# Patient Record
Sex: FEMALE | Race: WHITE | NOT HISPANIC OR LATINO | Employment: FULL TIME | URBAN - METROPOLITAN AREA
[De-identification: names, ages, dates, MRNs, and addresses within clinical notes are randomized per-mention and may not be internally consistent; named-entity substitution may affect disease eponyms.]

---

## 2017-01-13 ENCOUNTER — OFFICE VISIT (OUTPATIENT)
Dept: SLEEP MEDICINE | Facility: CLINIC | Age: 57
End: 2017-01-13
Payer: COMMERCIAL

## 2017-01-13 VITALS
DIASTOLIC BLOOD PRESSURE: 70 MMHG | WEIGHT: 167.69 LBS | HEIGHT: 63 IN | SYSTOLIC BLOOD PRESSURE: 100 MMHG | BODY MASS INDEX: 29.71 KG/M2 | HEART RATE: 80 BPM

## 2017-01-13 DIAGNOSIS — G47.33 OSA (OBSTRUCTIVE SLEEP APNEA): Primary | ICD-10-CM

## 2017-01-13 PROCEDURE — 99213 OFFICE O/P EST LOW 20 MIN: CPT | Mod: S$GLB,,, | Performed by: PSYCHIATRY & NEUROLOGY

## 2017-01-13 PROCEDURE — 99999 PR PBB SHADOW E&M-EST. PATIENT-LVL III: CPT | Mod: PBBFAC,,, | Performed by: PSYCHIATRY & NEUROLOGY

## 2017-01-13 NOTE — PROGRESS NOTES
This 56 y.o. female patient presents for the management of obstructive sleep apnea.    Patient had a repeat sleep study after significant weight loss following bariatric procedure.  PSG revealed VINOD, but limited to supine sleep. Total AHI 14.3, supine AHI 45.1, side AHI 3.7; wt 138 lbs.    She needs new supplies. It has been over 2 years since her last sleep medicine evaluation.    She reports nightly use; Good control of her VINOD symptoms; Due to using CPAP she has resolution of headaches and daytime sleepiness.    ESS = 1    DME = OHME    CPAP interrogation: REmstar Plus; set at 8 cm; blowing at 8.0 cm; 27,448 hours    Prior weight 139 lbs.   Wt today is 167 lbs      Past Medical History   Diagnosis Date    Anxiety      situational    Deviated septum     Fibroid     Hyperlipemia     Liver disease      fatty liver    PONV (postoperative nausea and vomiting)     Sleep apnea      cpap pressure 8    Steatosis        Past Surgical History   Procedure Laterality Date    Dilation and curettage of uterus      Gastric bypass      Hemorrhoid surgery      Tonsillectomy, adenoidectomy      Tubal ligation      Nelson tooth extraction      Carpal tunnel release      Lasik       10/26/03    Hysterectomy       TVH BSO       Family History   Problem Relation Age of Onset    Hypothyroidism Mother      had thyroidectomy    Hypertension Father     Arthritis Father     Hyperlipidemia Father     Psoriasis Neg Hx     Lupus Neg Hx     Ovarian cancer Neg Hx     Cervical cancer Neg Hx     Endometrial cancer Neg Hx     Vaginal cancer Neg Hx     Eczema Daughter     Acne Daughter     Acne Son     Breast cancer Paternal Aunt     Breast cancer Paternal Grandmother        Social History   Substance Use Topics    Smoking status: Never Smoker    Smokeless tobacco: Never Used    Alcohol use No       ALLERGIES: Reviewed in EPIC    CURRENT MEDICATIONS: Reviewed in EPIC    REVIEW OF SYSTEMS:  Sleep related symptoms as  "per HPI;    Weight is stable;   Sometimes sinus problems - nasal drip;    Denies dyspnea;    Denies palpitations;     PHYSICAL EXAM:  Visit Vitals    /70    Pulse 80    Ht 5' 3" (1.6 m)    Wt 76.1 kg (167 lb 11.2 oz)    BMI 29.71 kg/m2     GENERAL: Well groomed; Normally developed;  HEENT: Conjunctivae are non-erythematous; Pupils equal, round, and reactive to light;    Nose is skewed to the right; Nasal mucosa is pink and moist; Septum is midline;    Turbinates are normal; Nasal airflow is normal;    Posterior pharynx is pink; Posterior palate is normal; Modified Mallampati: III   Uvula is normal; Tongue is normal; Tonsils +1;    Dentition is fair; No TMJ tenderness;    Jaw opening and protrusion without click and without discomfort.  NECK: Supple. No thyromegaly. No palpable nodes.  SKIN: On face and neck: No abrasions, no rashes, no lesions.     No subcutaneous nodules are palpable.  RESPIRATORY: Chest is clear to auscultation.     Normal chest expansion and non-labored breathing at rest.  CARDIOVASCULAR: Normal S1, S2.  No murmurs, gallops or rubs.   No carotid bruits bilaterally.  EXTREMITIES: No clubbing. No cyanosis. Edema is absent.   NEURO: Oriented to time, place and person.    Normal attention span and concentration.  Station normal. Gait normal.  PSYCH: Affect is full. Mood is normal.      ASSESSMENT:    1. Obstructive Sleep Apnea, mild by AHI criteria. Symptoms are controlled with nightly CPAP. Effective pressure, defined by titration, but her weight is up >10%. She needs new supplies and equipment; He machine is reaching end of life. She needs a new machine.         PLAN:    Treatment:  1. New auto CPAP at 8-12 cm; New supplies  2. Set at self-adjusting mode since she had 20 lbs weight gain.    Precautions: The patient was advised to abstain from driving should they feel sleepy or drowsy.    Follow up: 6-12 weeks. MD/NP  "

## 2017-01-13 NOTE — MR AVS SNAPSHOT
[facility]  [address]  [facility] [address]  Phone: [phone]                  Ayah Ceron   2017 10:40 AM   Office Visit    Description:  Female : 1960   Provider:  Erlin Hurtado MD   Department:  [facility]           Reason for Visit     Sleep Apnea           Diagnoses this Visit        Comments    VINOD (obstructive sleep apnea)    -  Primary            To Do List           Future Appointments        Provider Department Dept Phone    2017 3:15 PM Manuela Suarez, OD [facility] - [facility] [phone]    3/6/2017 9:00 AM Erlin Hurtado MD [facility] [phone]      Goals (5 Years of Data)     None      Follow-Up and Disposition     Return in about 7 weeks (around 3/3/2017).    Follow-up and Disposition History      [facility]     [facility] -  Assistance  Registered nurses in the [facility] provide clinical advisement, health education, appointment booking, and other advisory services.  Call for this free service at [phone].             Medications           Message regarding Medications     Verify the changes and/or additions to your medication regime listed below are the same as discussed with your clinician today.  If any of these changes or additions are incorrect, please notify your healthcare provider.             Verify that the below list of medications is an accurate representation of the medications you are currently taking.  If none reported, the list may be blank. If incorrect, please contact your healthcare provider. Carry this list with you in case of emergency.           Current Medications     calcium citrate-vitamin D3 500 mg calcium -400 unit Chew Take by mouth. 2 tablets once per day    conjugated estrogens (PREMARIN) vaginal cream Place 0.5 g vaginally twice a week.    cyanocobalamin (VITAMIN B-12) 500 MCG tablet Take 500 mcg by mouth once daily. sublingual      "desloratadine (CLARINEX) 5 mg tablet Take 1 tablet (5 mg total) by mouth once daily.    FERROUS FUMARATE/VIT BCOMP&C (SUPER B COMPLEX ORAL) Take by mouth.    MAGNESIUM OXIDE (MAG-OXIDE ORAL) Take by mouth.    multivitamin capsule Take 1 capsule by mouth 2 (two) times daily.             Clinical Reference Information           Vital Signs - Last Recorded  Most recent update: 1/13/2017 10:34 AM by Stu Weeks    BP Pulse Ht Wt BMI    100/70 80 5' 3" (1.6 m) 76.1 kg (167 lb 11.2 oz) 29.71 kg/m2      Blood Pressure          Most Recent Value    BP  100/70      Allergies as of 1/13/2017     Augmentin [Amoxicillin-pot Clavulanate]      Immunizations Administered on Date of Encounter - 1/13/2017     None      Orders Placed During Today's Visit      Normal Orders This Visit    CPAP FOR HOME USE       "

## 2017-01-13 NOTE — LETTER
January 13, 2017      Elena Ortiz MD  [address]  [facility] [address]           [facility]  [address]  [facility] [address]  Phone: [phone]          Patient: Ayah Ceron   MR Number: [id number]   YOB: 1960   Date of Visit: 1/13/2017       Dear Dr. Elena Ortiz:    Thank you for referring Ayah Ceron to me for evaluation. Attached you will find relevant portions of my assessment and plan of care.    If you have questions, please do not hesitate to call me. I look forward to following Ayah Ceron along with you.    Sincerely,    Erlin Hurtado MD    Enclosure  CC:  No Recipients    If you would like to receive this communication electronically, please contact [email or web address] or [phone] to request more information on [employer] Link access.    For providers and/or their staff who would like to refer a patient to [facility], please contact us through our one-stop-shop provider referral line, [facility], at [phone].    If you feel you have received this communication in error or would no longer like to receive these types of communications, please e-mail [email or web address]

## 2017-01-17 ENCOUNTER — INITIAL CONSULT (OUTPATIENT)
Dept: OPTOMETRY | Facility: CLINIC | Age: 57
End: 2017-01-17
Payer: COMMERCIAL

## 2017-01-17 DIAGNOSIS — Z01.00 EXAMINATION OF EYES AND VISION: Primary | ICD-10-CM

## 2017-01-17 DIAGNOSIS — H52.4 PRESBYOPIA: ICD-10-CM

## 2017-01-17 PROCEDURE — 92014 COMPRE OPH EXAM EST PT 1/>: CPT | Mod: S$GLB,,, | Performed by: OPTOMETRIST

## 2017-01-17 PROCEDURE — 99999 PR PBB SHADOW E&M-EST. PATIENT-LVL II: CPT | Mod: PBBFAC,,, | Performed by: OPTOMETRIST

## 2017-01-17 NOTE — LETTER
January 18, 2017      Elena Ortiz MD  [address]  [facility] [address]           [facility]  [address]  [facility] [address]  Phone: [phone]  Fax: [phone]          Patient: Ayah Ceron   MR Number: [id number]   YOB: 1960   Date of Visit: 1/17/2017       Dear Dr. Elena Ortiz:    Thank you for referring Ayah Ceron to me for evaluation. Attached you will find relevant portions of my assessment and plan of care.    If you have questions, please do not hesitate to call me. I look forward to following Ayah Ceron along with you.    Sincerely,    Manuela Suarez, OD    Enclosure  CC:  No Recipients    If you would like to receive this communication electronically, please contact [email or web address] or [phone] to request more information on [employer] Link access.    For providers and/or their staff who would like to refer a patient to [facility], please contact us through our one-stop-shop provider referral line, [facility], at [phone].    If you feel you have received this communication in error or would no longer like to receive these types of communications, please e-mail [email or web address]

## 2017-01-19 NOTE — PROGRESS NOTES
HPI     Last eye exam was 4/7/16 with Dr. Suarez.  Patient states no vision complaints since last exam. Only wears OTC   readers for very small print.   Patient denies diplopia, headaches, flashes/floaters, and pain.         Last edited by Wendy Castro on 1/17/2017  3:10 PM.         Assessment /Plan     For exam results, see Encounter Report.    Examination of eyes and vision    Presbyopia            1-2.   Continue w/ current rx.  Eye health normal OU.  RTC 1 year for routine exam.

## 2017-01-31 ENCOUNTER — OFFICE VISIT (OUTPATIENT)
Dept: HEPATOLOGY | Facility: CLINIC | Age: 57
End: 2017-01-31
Payer: COMMERCIAL

## 2017-01-31 VITALS
DIASTOLIC BLOOD PRESSURE: 70 MMHG | HEART RATE: 71 BPM | HEIGHT: 64 IN | TEMPERATURE: 98 F | WEIGHT: 149.25 LBS | SYSTOLIC BLOOD PRESSURE: 111 MMHG | BODY MASS INDEX: 25.48 KG/M2 | RESPIRATION RATE: 18 BRPM | OXYGEN SATURATION: 97 %

## 2017-01-31 DIAGNOSIS — E78.5 HYPERLIPIDEMIA, UNSPECIFIED HYPERLIPIDEMIA TYPE: ICD-10-CM

## 2017-01-31 DIAGNOSIS — K76.0 NAFL (NONALCOHOLIC FATTY LIVER): Primary | ICD-10-CM

## 2017-01-31 DIAGNOSIS — Z98.84 HISTORY OF GASTRIC BYPASS: ICD-10-CM

## 2017-01-31 PROCEDURE — 99999 PR PBB SHADOW E&M-EST. PATIENT-LVL III: CPT | Mod: PBBFAC,,, | Performed by: INTERNAL MEDICINE

## 2017-01-31 PROCEDURE — 99213 OFFICE O/P EST LOW 20 MIN: CPT | Mod: S$GLB,,, | Performed by: INTERNAL MEDICINE

## 2017-01-31 NOTE — PROGRESS NOTES
HEPATOLOGY FOLLOW UP    Ayah Ceron is here for follow up of Fatty Liver    HPI  Since Ayah Ceron's last visit in 2014 she has being doing well. She had elevated liver enzymes post-gastric bypass. These have normalized. No symptoms of hepatic decompensation. Currently feels well.    Outpatient Encounter Prescriptions as of 1/31/2017   Medication Sig Dispense Refill    calcium citrate-vitamin D3 500 mg calcium -400 unit Chew Take by mouth. 2 tablets once per day      conjugated estrogens (PREMARIN) vaginal cream Place 0.5 g vaginally twice a week. 30 g 11    cyanocobalamin (VITAMIN B-12) 500 MCG tablet Take 500 mcg by mouth once daily. sublingual       desloratadine (CLARINEX) 5 mg tablet Take 1 tablet (5 mg total) by mouth once daily. 90 tablet 3    FERROUS FUMARATE/VIT BCOMP&C (SUPER B COMPLEX ORAL) Take by mouth.      MAGNESIUM OXIDE (MAG-OXIDE ORAL) Take by mouth.      multivitamin capsule Take 1 capsule by mouth 2 (two) times daily.         No facility-administered encounter medications on file as of 1/31/2017.      Review of patient's allergies indicates:   Allergen Reactions    Augmentin [amoxicillin-pot clavulanate]      Abdominal cramping     Past Medical History   Diagnosis Date    Anxiety      situational    Deviated septum     Fibroid     Hyperlipemia     Liver disease      fatty liver    PONV (postoperative nausea and vomiting)     Sleep apnea      cpap pressure 8    Steatosis        Review of Systems   Constitutional: Negative for appetite change, fatigue and unexpected weight change.   HENT: Negative for ear pain, hearing loss, sore throat and trouble swallowing.    Eyes: Negative for visual disturbance.   Respiratory: Negative for cough and shortness of breath.    Cardiovascular: Negative for chest pain and palpitations.   Gastrointestinal: Negative for abdominal pain, nausea and vomiting.   Genitourinary: Negative for difficulty urinating and dysuria.   Musculoskeletal: Negative  for arthralgias.   Skin: Negative for rash.   Neurological: Negative for tremors, seizures and headaches.   Psychiatric/Behavioral: Negative for agitation and decreased concentration.     Vitals:    01/31/17 1426   BP: 111/70   Pulse: 71   Resp: 18   Temp: 97.8 °F (36.6 °C)       Physical Exam   Constitutional: She is oriented to person, place, and time. She appears well-developed and well-nourished.   HENT:   Right Ear: External ear normal.   Left Ear: External ear normal.   Mouth/Throat: Oropharynx is clear and moist.   Eyes: No scleral icterus.   Cardiovascular: Normal rate, regular rhythm and normal heart sounds.  Exam reveals no gallop and no friction rub.    No murmur heard.  Pulmonary/Chest: Effort normal and breath sounds normal. No respiratory distress. She has no wheezes.   Abdominal: Soft. Bowel sounds are normal. She exhibits no distension and no mass. There is no tenderness.   Musculoskeletal: Normal range of motion. She exhibits no edema.   Lymphadenopathy:     She has no cervical adenopathy.   Neurological: She is alert and oriented to person, place, and time. She has normal strength.   Skin: Skin is warm, dry and intact. She is not diaphoretic. No pallor.       Lab Results   Component Value Date    GLU 88 10/25/2016    BUN 14 10/25/2016    CREATININE 0.7 10/25/2016    CALCIUM 9.6 10/25/2016     10/25/2016    K 4.0 10/25/2016     10/25/2016    PROT 6.7 10/25/2016    CO2 30 (H) 10/25/2016    ANIONGAP 8 10/25/2016    WBC 3.77 (L) 10/25/2016    RBC 4.78 10/25/2016    HGB 14.6 10/25/2016    HCT 43.2 10/25/2016    MCV 90 10/25/2016    MCH 30.5 10/25/2016    MCHC 33.8 10/25/2016     Lab Results   Component Value Date    RDW 12.9 10/25/2016     10/25/2016    MPV 10.2 10/25/2016    GRAN 1.6 (L) 10/25/2016    GRAN 42.1 10/25/2016    LYMPH 1.5 10/25/2016    LYMPH 40.6 10/25/2016    MONO 0.3 10/25/2016    MONO 7.7 10/25/2016    EOSINOPHIL 7.2 10/25/2016    BASOPHIL 1.9 10/25/2016    EOS 0.3  10/25/2016    BASO 0.07 10/25/2016    APTT 27.0 10/17/2012    GROUPTRH O POS 04/19/2011    PADMINI Negative 05/19/2010    CHOL 222 (H) 10/25/2016    TRIG 77 10/25/2016    HDL 72 10/25/2016    CHOLHDL 32.4 10/25/2016    TOTALCHOLEST 3.1 10/25/2016    ALBUMIN 3.9 10/25/2016    BILIDIR 0.2 10/17/2012    AST 28 10/25/2016    ALT 34 10/25/2016    ALKPHOS 86 10/25/2016    MG 2.1 04/26/2011    LABPROT 11.2 10/17/2012    INR 1.1 10/17/2012     Diagnostics:     Assessment and Plan:  Patient Active Problem List   Diagnosis    Functional dyspareunia    Hyperlipemia    Steatosis    Deviated septum    Sinus problem    History of gastric bypass    Rotator cuff tear    Vaginal relaxation    Incomplete defecation    Rectocele    Sleep apnea    Atrophic vaginitis    NAFL (nonalcoholic fatty liver)     Ayah Ceron is a 56 y.o. female withFatty Liver    Had liver enzymes after gastric bypass, but these have now normalized. Recommend periodic testing of LFTs (q6-12months). RTC in 12 months.

## 2017-01-31 NOTE — LETTER
January 31, 2017      Elena Ortiz MD  [address]  [facility] [address]           [facility] - Hepatology  [address]  [facility] [address]  Phone: [phone]  Fax: [phone]          Patient: Ayah Ceron   MR Number: [id number]   YOB: 1960   Date of Visit: 1/31/2017       Dear Dr. Elena Ortiz:    Thank you for referring Ayah Ceron to me for evaluation. Attached you will find relevant portions of my assessment and plan of care.    If you have questions, please do not hesitate to call me. I look forward to following Ayah Ceron along with you.    Sincerely,    Satinder Leiva MD    Enclosure  CC:  No Recipients    If you would like to receive this communication electronically, please contact [email or web address] or [phone] to request more information on [employer] Link access.    For providers and/or their staff who would like to refer a patient to [facility], please contact us through our one-stop-shop provider referral line, [facility], at [phone].    If you feel you have received this communication in error or would no longer like to receive these types of communications, please e-mail [email or web address]

## 2017-03-06 ENCOUNTER — OFFICE VISIT (OUTPATIENT)
Dept: SLEEP MEDICINE | Facility: CLINIC | Age: 57
End: 2017-03-06
Payer: COMMERCIAL

## 2017-03-06 VITALS
SYSTOLIC BLOOD PRESSURE: 109 MMHG | HEART RATE: 80 BPM | WEIGHT: 148.81 LBS | DIASTOLIC BLOOD PRESSURE: 75 MMHG | BODY MASS INDEX: 25.41 KG/M2 | HEIGHT: 64 IN

## 2017-03-06 DIAGNOSIS — G47.33 OSA (OBSTRUCTIVE SLEEP APNEA): Primary | ICD-10-CM

## 2017-03-06 PROCEDURE — 99213 OFFICE O/P EST LOW 20 MIN: CPT | Mod: S$GLB,,, | Performed by: PSYCHIATRY & NEUROLOGY

## 2017-03-06 PROCEDURE — 99999 PR PBB SHADOW E&M-EST. PATIENT-LVL III: CPT | Mod: PBBFAC,,, | Performed by: PSYCHIATRY & NEUROLOGY

## 2017-03-06 NOTE — PROGRESS NOTES
This 56 y.o. female patient returns for the management of obstructive sleep apnea.    She is here on a new CPAP machine;  She reports nightly use; Good control of her VINOD symptoms; No snoring  Due to using CPAP she has resolution of headaches and daytime sleepiness.    ESS = 1    DME = OHME    CPAP interrogation: Compliant 30/30 days; REmstar Dreamstation; set at 8-12 cm; 30/30 days >4 hours; 30-day ave 7.9 hours/n; AHI 3.9, 90% 10 cm    Prior weight 167 lbs. (she questions whether this measurement was correct)   Wt today is 148 lbs    Patient had a repeat sleep study after significant weight loss following bariatric procedure.  PSG revealed VINOD, but limited to supine sleep. Total AHI 14.3, supine AHI 45.1, side AHI 3.7; wt 138 lbs.    Past Medical History:   Diagnosis Date    Anxiety     situational    Deviated septum     Fibroid     Hyperlipemia     Liver disease     fatty liver    PONV (postoperative nausea and vomiting)     Sleep apnea     cpap pressure 8    Steatosis        Past Surgical History:   Procedure Laterality Date    CARPAL TUNNEL RELEASE      DILATION AND CURETTAGE OF UTERUS      GASTRIC BYPASS      HEMORRHOID SURGERY      HYSTERECTOMY      TVH BSO    LASIK      10/26/03    TONSILLECTOMY, ADENOIDECTOMY      TUBAL LIGATION      WISDOM TOOTH EXTRACTION         Family History   Problem Relation Age of Onset    Hypothyroidism Mother      had thyroidectomy    Hypertension Father     Arthritis Father     Hyperlipidemia Father     Eczema Daughter     Acne Daughter     Acne Son     Breast cancer Paternal Aunt     Breast cancer Paternal Grandmother     Psoriasis Neg Hx     Lupus Neg Hx     Ovarian cancer Neg Hx     Cervical cancer Neg Hx     Endometrial cancer Neg Hx     Vaginal cancer Neg Hx        Social History   Substance Use Topics    Smoking status: Never Smoker    Smokeless tobacco: Never Used    Alcohol use No       ALLERGIES: Reviewed in EPIC    CURRENT MEDICATIONS:  "Reviewed in EPIC    REVIEW OF SYSTEMS:  Sleep related symptoms as per HPI;    Weight is stable;   Sometimes sinus problems - nasal drip;    Denies dyspnea;    Denies palpitations;     PHYSICAL EXAM:  /75  Pulse 80  Ht 5' 4" (1.626 m)  Wt 67.5 kg (148 lb 13 oz)  BMI 25.54 kg/m2  GENERAL: Well groomed; Normally developed;  HEENT: Conjunctivae are non-erythematous; Pupils equal, round, and reactive to light;        ASSESSMENT:    1. Obstructive Sleep Apnea, mild by AHI criteria. Symptoms are controlled with nightly CPAP. Effective pressure by 90%tile on new CPAP. She is adherent objectively and symptomatically improved. She has exceeded minimal usage after replacement machine.         PLAN:    Treatment:  1. Continue auto CPAP at 8-12 cm; CPAP supplies  2. Weight up 10 lbs from requal study.    Precautions: The patient was advised to abstain from driving should they feel sleepy or drowsy.    Follow up: 6-12 months. MD/NP  "

## 2017-07-10 ENCOUNTER — TELEPHONE (OUTPATIENT)
Dept: INTERNAL MEDICINE | Facility: CLINIC | Age: 57
End: 2017-07-10

## 2017-07-10 DIAGNOSIS — Z12.31 ENCOUNTER FOR SCREENING MAMMOGRAM FOR BREAST CANCER: ICD-10-CM

## 2017-07-10 DIAGNOSIS — Z98.84 HISTORY OF GASTRIC BYPASS: Primary | ICD-10-CM

## 2017-07-10 DIAGNOSIS — Z00.00 WELLNESS EXAMINATION: ICD-10-CM

## 2017-07-10 NOTE — TELEPHONE ENCOUNTER
----- Message from Rossi Carrillo sent at 7/10/2017  9:09 AM CDT -----  Contact: self/[phone]  Patient called in regards needing to place an order for her mammogram, and labs for her upcoming visit on 10/23/17. Please call and advise.         Thank you!!!

## 2017-10-13 ENCOUNTER — OFFICE VISIT (OUTPATIENT)
Dept: BARIATRICS | Facility: CLINIC | Age: 57
End: 2017-10-13
Payer: COMMERCIAL

## 2017-10-13 VITALS
BODY MASS INDEX: 23.93 KG/M2 | SYSTOLIC BLOOD PRESSURE: 112 MMHG | HEIGHT: 64 IN | DIASTOLIC BLOOD PRESSURE: 67 MMHG | HEART RATE: 66 BPM | WEIGHT: 140.19 LBS

## 2017-10-13 DIAGNOSIS — R63.4 WEIGHT LOSS: ICD-10-CM

## 2017-10-13 DIAGNOSIS — Z98.84 HISTORY OF GASTRIC BYPASS: Primary | ICD-10-CM

## 2017-10-13 PROCEDURE — 99212 OFFICE O/P EST SF 10 MIN: CPT | Mod: S$GLB,,, | Performed by: PHYSICIAN ASSISTANT

## 2017-10-13 PROCEDURE — 99999 PR PBB SHADOW E&M-EST. PATIENT-LVL III: CPT | Mod: PBBFAC,,, | Performed by: PHYSICIAN ASSISTANT

## 2017-10-13 RX ORDER — DOCUSATE SODIUM 100 MG/1
100 CAPSULE, LIQUID FILLED ORAL 2 TIMES DAILY
COMMUNITY

## 2017-10-13 NOTE — PROGRESS NOTES
Subjective:       Patient ID: Ayah Ceron is a 56 y.o. female.    Chief Complaint: Follow-up (LRNY 4/25/11)    HPI: Patient presents for 6 year follow up of LRNY.  She is feeling well and tolerating the diet without difficulty.  She is without complaints.  She is maintaining 70 pound loss and a normal BMI.  She is doing very well.  She has a grand-daughter that is 2: Maurice.                                                                         EXERCISE and VITAMINS: Reviewed in bariatric assessment.                                                                                DIET:  Regular bariatric diet.  Obtains >80 gm prot daily through 4-5 small meals and 2 protein shakes daily (mostly while at work and not the weekends).   48+ fl oz                                                                               MEDICATIONS AND ALLERGIES:  Reviewed in Navigator.    Review of Systems   Constitutional: Negative for activity change, appetite change, fatigue and fever.   HENT: Negative for trouble swallowing and voice change.    Respiratory: Negative for cough, choking and chest tightness.    Cardiovascular: Negative for chest pain and palpitations.   Gastrointestinal: Negative for abdominal distention, abdominal pain, blood in stool, constipation, diarrhea, nausea and vomiting.       Objective:      Physical Exam   Constitutional: She is oriented to person, place, and time. She appears well-developed and well-nourished.   HENT:   Head: Normocephalic and atraumatic.   Eyes: Conjunctivae are normal. No scleral icterus.   Musculoskeletal: She exhibits no edema.   Neurological: She is alert and oriented to person, place, and time.   Skin: Skin is warm and dry.   Psychiatric: She has a normal mood and affect. Her behavior is normal.   Nursing note and vitals reviewed.      Assessment:       -  Excellent weight loss (99% EWL) s/p DESTINI on 4/25/2011.  -  Co Morbidities: VINOD (stable with CPAP), KAHN, and HDL.  All stable  without medication.   -  Excellent diet, except for regular carbohydrates that may be contributing to abdominal girth increase.  -  Good exercise.  -  Good vitamin regimen.    Plan:       - Patient to follow bariatric diet plan and continue obtaining 80 gm protein daily.   - Patient to continue exercising on a regular basis.  - Continue avoidance of NSAIDs to prevent gastric ulcer disease.  - Patient to take bariatric vitamin regimen: MVI, calcium citrate, and Vitamin B12 as directed.  - RTC yearly or every other year or sooner if needed.  - Call the office for any issues.  - Check yearly labs next week with PCP labs.       15 minute visit, over 15 minutes spent counseling patient on diet, vitamins, exercise, and weight loss.

## 2017-10-25 ENCOUNTER — DOCUMENTATION ONLY (OUTPATIENT)
Dept: INTERNAL MEDICINE | Facility: CLINIC | Age: 57
End: 2017-10-25

## 2017-10-25 ENCOUNTER — OFFICE VISIT (OUTPATIENT)
Dept: INTERNAL MEDICINE | Facility: CLINIC | Age: 57
End: 2017-10-25
Payer: COMMERCIAL

## 2017-10-25 VITALS
WEIGHT: 141.56 LBS | HEART RATE: 70 BPM | HEIGHT: 64 IN | BODY MASS INDEX: 24.17 KG/M2 | DIASTOLIC BLOOD PRESSURE: 62 MMHG | SYSTOLIC BLOOD PRESSURE: 116 MMHG | OXYGEN SATURATION: 98 %

## 2017-10-25 DIAGNOSIS — N95.2 ATROPHIC VAGINITIS: ICD-10-CM

## 2017-10-25 DIAGNOSIS — E78.00 PURE HYPERCHOLESTEROLEMIA: ICD-10-CM

## 2017-10-25 DIAGNOSIS — R79.0 LOW MAGNESIUM LEVEL: ICD-10-CM

## 2017-10-25 DIAGNOSIS — Z98.84 HISTORY OF GASTRIC BYPASS: ICD-10-CM

## 2017-10-25 DIAGNOSIS — Z12.11 COLON CANCER SCREENING: ICD-10-CM

## 2017-10-25 DIAGNOSIS — G47.30 SLEEP APNEA, UNSPECIFIED TYPE: ICD-10-CM

## 2017-10-25 DIAGNOSIS — Z00.00 PHYSICAL EXAM: Primary | ICD-10-CM

## 2017-10-25 PROCEDURE — 99999 PR PBB SHADOW E&M-EST. PATIENT-LVL III: CPT | Mod: PBBFAC,,, | Performed by: INTERNAL MEDICINE

## 2017-10-25 PROCEDURE — 99396 PREV VISIT EST AGE 40-64: CPT | Mod: S$GLB,,, | Performed by: INTERNAL MEDICINE

## 2017-10-25 RX ORDER — DESLORATADINE 5 MG/1
5 TABLET ORAL DAILY
Qty: 90 TABLET | Refills: 3 | Status: SHIPPED | OUTPATIENT
Start: 2017-10-25 | End: 2018-12-14 | Stop reason: SDUPTHER

## 2017-10-25 NOTE — PROGRESS NOTES
Subjective:      Patient ID: Ayah Ceron is a 56 y.o. female.    Chief Complaint: Annual Exam    HPI:  HPI   Patient is here for her annual exam:patient is not having any problems     Annual exam:10/25/2017  Colonoscopy: 2012 follow up in 7 years no family of personal history of colon polyps: Fit card given  Mammogram: scheduled for this Friday  Gyn: hysterectomy and oophorectomy 2/07 : Gyn:   Optho:Dr. Suarez due 1/2018  Flu: done   Tetanus:10/14/2014  Shingles: not due   Pneumovax: due 67  Prevnar: due 66  Bone Density:: not due        Consultants  Bariatric Surgery follow up:Brittaney: orders placed  Hepatology : Dr. Leiva follow up 1/2018  Dr. Suarez 1/2018  Dr. Blanchard:rectocele surgery  Dr. Hurtado: Sleep apnea:patient uses sleep apnea equipment      Patient Active Problem List   Diagnosis    Functional dyspareunia    Hyperlipemia    Steatosis    Deviated septum    Sinus problem    History of gastric bypass    Rotator cuff tear    Vaginal relaxation    Incomplete defecation    Rectocele    Sleep apnea    Atrophic vaginitis    NAFL (nonalcoholic fatty liver)     Past Medical History:   Diagnosis Date    Anxiety     situational    Deviated septum     Fibroid     Hyperlipemia     Liver disease     fatty liver    PONV (postoperative nausea and vomiting)     Sleep apnea     cpap pressure 8    Steatosis      Past Surgical History:   Procedure Laterality Date    CARPAL TUNNEL RELEASE      DILATION AND CURETTAGE OF UTERUS      GASTRIC BYPASS  04/25/2011    HEMORRHOID SURGERY      HYSTERECTOMY      TVH BSO    LASIK      10/26/03    TONSILLECTOMY, ADENOIDECTOMY      TUBAL LIGATION      WISDOM TOOTH EXTRACTION       Family History   Problem Relation Age of Onset    Hypothyroidism Mother      had thyroidectomy    Hypertension Father     Arthritis Father     Hyperlipidemia Father     Eczema Daughter     Acne Daughter     Acne Son     Breast cancer Paternal Aunt     Breast cancer Paternal  "Grandmother     Psoriasis Neg Hx     Lupus Neg Hx     Ovarian cancer Neg Hx     Cervical cancer Neg Hx     Endometrial cancer Neg Hx     Vaginal cancer Neg Hx      Review of Systems   Constitutional: Negative for chills, fever and unexpected weight change.   HENT: Negative for ear pain, nosebleeds, sore throat, trouble swallowing and voice change.    Eyes: Negative for photophobia and visual disturbance.   Respiratory: Negative for cough, shortness of breath and wheezing.    Cardiovascular: Negative for chest pain, palpitations and leg swelling.   Gastrointestinal: Negative for abdominal distention, abdominal pain and blood in stool.   Genitourinary: Negative for difficulty urinating, dysuria, flank pain and hematuria.   Musculoskeletal: Negative for back pain, gait problem and joint swelling.   Skin: Negative for color change and rash.   Neurological: Negative for seizures and headaches.   Hematological: Negative for adenopathy. Does not bruise/bleed easily.   Psychiatric/Behavioral: Negative for dysphoric mood and suicidal ideas.     Objective:     Vitals:    10/25/17 1010   BP: 116/62   Pulse: 70   SpO2: 98%   Weight: 64.2 kg (141 lb 8.6 oz)   Height: 5' 4" (1.626 m)   PainSc: 0-No pain     Body mass index is 24.29 kg/m².  Physical Exam   Constitutional: She is oriented to person, place, and time. She appears well-developed and well-nourished.   HENT:   Right Ear: Tympanic membrane, external ear and ear canal normal.   Left Ear: Tympanic membrane, external ear and ear canal normal.   Nose: Nose normal.   Mouth/Throat: Uvula is midline.   Eyes: Conjunctivae and EOM are normal.   Neck: Carotid bruit is not present. No thyromegaly present.   Cardiovascular: Normal rate and regular rhythm.    Pulmonary/Chest: Effort normal. No respiratory distress.   Abdominal: Soft. Normal appearance and bowel sounds are normal. There is no hepatomegaly.   Musculoskeletal: Normal range of motion.   Lymphadenopathy:     She has " no cervical adenopathy.     She has no axillary adenopathy.   Neurological: She is alert and oriented to person, place, and time.   Skin: Skin is warm and dry. No rash noted.     Assessment:     1. Physical exam    2. Colon cancer screening    3. Low magnesium level    4. Atrophic vaginitis    5. History of gastric bypass    6. Pure hypercholesterolemia    7. Sleep apnea, unspecified type      Plan:   Ayah DUVALL was seen today for annual exam.    Diagnoses and all orders for this visit:    Physical exam:updated and reviewed    Colon cancer screening  -     Fecal Immunochemical Test (iFOBT); Future    Low magnesium level: added to labs  -     Magnesium; Future    Atrophic vaginitis: on premarin    History of gastric bypass: followed in Gastric Bypass clinic    Pure hypercholesterolemia:will check lipid    Sleep apnea, unspecified type: uses equipment    Other orders  -     desloratadine (CLARINEX) 5 mg tablet; Take 1 tablet (5 mg total) by mouth once daily.      Return in about 1 year (around 10/25/2018).     Medication List          Accurate as of 10/25/17 11:15 AM. If you have any questions, ask your nurse or doctor.               CONTINUE taking these medications    calcium citrate-vitamin D3 500 mg calcium -400 unit Chew     conjugated estrogens vaginal cream  Commonly known as:  PREMARIN  Place 0.5 g vaginally twice a week.     desloratadine 5 mg tablet  Commonly known as:  CLARINEX  Take 1 tablet (5 mg total) by mouth once daily.     docusate sodium 100 MG capsule  Commonly known as:  COLACE     MAG-OXIDE ORAL     multivitamin capsule     SUPER B COMPLEX ORAL     VITAMIN B-12 500 MCG tablet  Generic drug:  cyanocobalamin           Where to Get Your Medications      These medications were sent to [facility] [address]  [address] [facility] [address]    Phone:  [phone]   · desloratadine 5 mg tablet

## 2017-10-27 ENCOUNTER — HOSPITAL ENCOUNTER (OUTPATIENT)
Dept: RADIOLOGY | Facility: HOSPITAL | Age: 57
Discharge: HOME | End: 2017-10-27
Attending: INTERNAL MEDICINE
Payer: COMMERCIAL

## 2017-10-27 VITALS — HEIGHT: 64 IN | WEIGHT: 141 LBS | BODY MASS INDEX: 24.07 KG/M2

## 2017-10-27 DIAGNOSIS — Z12.31 ENCOUNTER FOR SCREENING MAMMOGRAM FOR BREAST CANCER: ICD-10-CM

## 2017-10-27 PROCEDURE — 77063 BREAST TOMOSYNTHESIS BI: CPT | Mod: 26,,, | Performed by: RADIOLOGY

## 2017-10-27 PROCEDURE — 77067 SCR MAMMO BI INCL CAD: CPT | Mod: TC

## 2017-10-27 PROCEDURE — 77067 SCR MAMMO BI INCL CAD: CPT | Mod: 26,,, | Performed by: RADIOLOGY

## 2017-10-31 ENCOUNTER — TELEPHONE (OUTPATIENT)
Dept: RADIOLOGY | Facility: HOSPITAL | Age: 57
End: 2017-10-31

## 2017-10-31 NOTE — TELEPHONE ENCOUNTER
Spoke with patient and explained mammogram findings.Patient expressed understanding of results. Patient is scheduled for a abnormal mammogram follow up appointment at [facility] on 11/1/17

## 2017-11-01 ENCOUNTER — HOSPITAL ENCOUNTER (OUTPATIENT)
Dept: RADIOLOGY | Facility: HOSPITAL | Age: 57
Discharge: HOME | End: 2017-11-01
Attending: INTERNAL MEDICINE
Payer: COMMERCIAL

## 2017-11-01 DIAGNOSIS — R92.8 ABNORMAL MAMMOGRAM: ICD-10-CM

## 2017-11-01 PROCEDURE — 77065 DX MAMMO INCL CAD UNI: CPT | Mod: 26,LT,, | Performed by: RADIOLOGY

## 2017-11-01 PROCEDURE — 77061 BREAST TOMOSYNTHESIS UNI: CPT | Mod: 26,LT,, | Performed by: RADIOLOGY

## 2017-11-01 PROCEDURE — 77061 BREAST TOMOSYNTHESIS UNI: CPT | Mod: TC,LT

## 2017-11-06 ENCOUNTER — PATIENT MESSAGE (OUTPATIENT)
Dept: BARIATRICS | Facility: CLINIC | Age: 57
End: 2017-11-06

## 2017-11-08 ENCOUNTER — LAB VISIT (OUTPATIENT)
Dept: LAB | Facility: HOSPITAL | Age: 57
End: 2017-11-08
Attending: INTERNAL MEDICINE
Payer: COMMERCIAL

## 2017-11-08 DIAGNOSIS — Z12.11 COLON CANCER SCREENING: ICD-10-CM

## 2017-11-08 LAB — HEMOCCULT STL QL IA: NEGATIVE

## 2017-11-08 PROCEDURE — 82274 ASSAY TEST FOR BLOOD FECAL: CPT

## 2018-01-22 ENCOUNTER — OFFICE VISIT (OUTPATIENT)
Dept: OPTOMETRY | Facility: CLINIC | Age: 58
End: 2018-01-22
Payer: COMMERCIAL

## 2018-01-22 DIAGNOSIS — H52.4 PRESBYOPIA: ICD-10-CM

## 2018-01-22 DIAGNOSIS — Z01.00 EXAMINATION OF EYES AND VISION: Primary | ICD-10-CM

## 2018-01-22 PROCEDURE — 92014 COMPRE OPH EXAM EST PT 1/>: CPT | Mod: S$GLB,,, | Performed by: OPTOMETRIST

## 2018-01-22 PROCEDURE — 99999 PR PBB SHADOW E&M-EST. PATIENT-LVL II: CPT | Mod: PBBFAC,,, | Performed by: OPTOMETRIST

## 2018-01-22 PROCEDURE — 92015 DETERMINE REFRACTIVE STATE: CPT | Mod: S$GLB,,, | Performed by: OPTOMETRIST

## 2018-01-22 NOTE — PROGRESS NOTES
HPI     Last eye exam was 4/7/16 with Dr. Suarez.  Axel states sometimes cellphone is blurry without readers. 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 Doesn't feel the need for   glasses while on the computer.  Patient denies diplopia, headaches, flashes/floaters, and pain.      Last edited by Wendy Castro on 1/22/2018 10:09 AM. (History)            Assessment /Plan     For exam results, see Encounter Report.    Examination of eyes and vision    Presbyopia            1-2.  No rx given.  Continue with OTC readers.  Eye health normal OU.  Educated pt occasional blurred vision on phone due to dryness.  RTC 1 year for routine exam.

## 2018-03-07 ENCOUNTER — OFFICE VISIT (OUTPATIENT)
Dept: SLEEP MEDICINE | Facility: CLINIC | Age: 58
End: 2018-03-07
Payer: COMMERCIAL

## 2018-03-07 VITALS
HEIGHT: 64 IN | SYSTOLIC BLOOD PRESSURE: 90 MMHG | DIASTOLIC BLOOD PRESSURE: 60 MMHG | BODY MASS INDEX: 23.98 KG/M2 | WEIGHT: 140.44 LBS

## 2018-03-07 DIAGNOSIS — G47.33 OSA (OBSTRUCTIVE SLEEP APNEA): Primary | ICD-10-CM

## 2018-03-07 PROCEDURE — 99213 OFFICE O/P EST LOW 20 MIN: CPT | Mod: S$GLB,,, | Performed by: PSYCHIATRY & NEUROLOGY

## 2018-03-07 PROCEDURE — 99999 PR PBB SHADOW E&M-EST. PATIENT-LVL III: CPT | Mod: PBBFAC,,, | Performed by: PSYCHIATRY & NEUROLOGY

## 2018-03-07 NOTE — PROGRESS NOTES
This 57 y.o. female patient returns for the management of obstructive sleep apnea.    She reports nightly CPAP use;  Good control of her VINOD symptoms; No snoring  Due to using CPAP she has resolution of headaches and daytime sleepiness.    EPWORTH SLEEPINESS SCALE 3/6/2018   Sitting and reading 1   Watching TV 1   Sitting, inactive in a public place (e.g. a theatre or a meeting) 0   As a passenger in a car for an hour without a break 0   Lying down to rest in the afternoon when circumstances permit 0   Sitting and talking to someone 0   Sitting quietly after a lunch without alcohol 0   In a car, while stopped for a few minutes in traffic 0   Total score 2     DME = OHME    CPAP interrogation: Compliant 30/30 days; [employer] Dreamstation; total hours 3348 hours; set at 8-12 cm; 30/30 days >4 hours; 30-day ave 8.1 hours/n; AHI 3.4, 90% 10.5 cm    Prior weight 167 lbs. (she questions whether this measurement was correct)   Wt today is 140 lbs    Patient had a repeat sleep study after significant weight loss following bariatric procedure.  PSG revealed VINOD, but limited to supine sleep. Total AHI 14.3, supine AHI 45.1, side AHI 3.7; wt 138 lbs.    Past Medical History:   Diagnosis Date    Anxiety     situational    Deviated septum     Fibroid     Hyperlipemia     Liver disease     fatty liver    PONV (postoperative nausea and vomiting)     Sleep apnea     cpap pressure 8    Steatosis        Past Surgical History:   Procedure Laterality Date    CARPAL TUNNEL RELEASE      DILATION AND CURETTAGE OF UTERUS      GASTRIC BYPASS  04/25/2011    HEMORRHOID SURGERY      HYSTERECTOMY      TVH BSO    LASIK      10/26/03    TONSILLECTOMY, ADENOIDECTOMY      TUBAL LIGATION      WISDOM TOOTH EXTRACTION         Family History   Problem Relation Age of Onset    Hypothyroidism Mother      had thyroidectomy    Hypertension Father     Arthritis Father     Hyperlipidemia Father     Eczema Daughter     Acne Daughter      "Acne Son     Breast cancer Paternal Aunt     Breast cancer Paternal Grandmother     Psoriasis Neg Hx     Lupus Neg Hx     Ovarian cancer Neg Hx     Cervical cancer Neg Hx     Endometrial cancer Neg Hx     Vaginal cancer Neg Hx        Social History   Substance Use Topics    Smoking status: Never Smoker    Smokeless tobacco: Never Used    Alcohol use No       ALLERGIES: Reviewed in EPIC    CURRENT MEDICATIONS: Reviewed in EPIC    REVIEW OF SYSTEMS:  Sleep related symptoms as per HPI;    Weight is stable;   Sometimes sinus problems - nasal drip;    Denies dyspnea;    Denies palpitations;     PHYSICAL EXAM:  BP 90/60 (BP Location: Left arm, Patient Position: Sitting, BP Method: Medium (Manual))   Ht 5' 4" (1.626 m)   Wt 63.7 kg (140 lb 6.9 oz)   BMI 24.11 kg/m²   GENERAL: Well groomed; Normally developed;  HEENT: Conjunctivae are non-erythematous; Pupils equal, round, and reactive to light;        ASSESSMENT:    1. Obstructive Sleep Apnea, mild by AHI criteria. Symptoms are controlled with nightly CPAP. Effective pressure by 90%tile on new CPAP. She is adherent objectively and symptomatically improved. She has exceeded minimal usage after replacement machine. She is benefiting from CPAP         PLAN:    Treatment:  1. Continue auto CPAP at 8-12 cm; CPAP supplies    Precautions: The patient was advised to abstain from driving should they feel sleepy or drowsy.    Follow up: 6-12 months. MD/NP  "

## 2018-10-23 ENCOUNTER — TELEPHONE (OUTPATIENT)
Dept: INTERNAL MEDICINE | Facility: CLINIC | Age: 58
End: 2018-10-23

## 2018-10-23 DIAGNOSIS — Z12.31 ENCOUNTER FOR SCREENING MAMMOGRAM FOR BREAST CANCER: Primary | ICD-10-CM

## 2018-10-23 NOTE — TELEPHONE ENCOUNTER
----- Message from Aisha Marquez sent at 10/23/2018  4:34 PM CDT -----  Contact: Patient [phone] (cell)  Type: Orders Request    What orders/ testing are being requested?Mammogram    Is there a future appointment scheduled for the patient with PCP?Yes    When?11/01/18    Would you prefer a response via [employer]?No    Comments:Please put in orders for pt to schedule an appt. Pt would like to have these orders put in so she can schedule her appt for the same day as her appt    Thanks

## 2018-10-23 NOTE — TELEPHONE ENCOUNTER
----- Message from Marly Mo sent at 10/23/2018  4:45 PM CDT -----  Contact: Pt  Request for Orders    Order Needed: Mammo Orders  Who Called: Pt   Contact Preference: [phone]  Additional Information: Pt would like to receive a call once the orders are in if possible, so she can know to call [facility] and schedule.

## 2018-10-26 NOTE — TELEPHONE ENCOUNTER
Outpatient Procedures Ordered This Visit    Mammo Digital Screening Bilat w/ Alessio

## 2018-11-01 ENCOUNTER — OFFICE VISIT (OUTPATIENT)
Dept: INTERNAL MEDICINE | Facility: CLINIC | Age: 58
End: 2018-11-01
Payer: COMMERCIAL

## 2018-11-01 ENCOUNTER — LAB VISIT (OUTPATIENT)
Dept: LAB | Facility: HOSPITAL | Age: 58
End: 2018-11-01
Attending: INTERNAL MEDICINE
Payer: COMMERCIAL

## 2018-11-01 VITALS
HEART RATE: 74 BPM | BODY MASS INDEX: 24.13 KG/M2 | WEIGHT: 141.31 LBS | HEIGHT: 64 IN | OXYGEN SATURATION: 99 % | DIASTOLIC BLOOD PRESSURE: 80 MMHG | SYSTOLIC BLOOD PRESSURE: 112 MMHG

## 2018-11-01 DIAGNOSIS — Z12.31 ENCOUNTER FOR SCREENING MAMMOGRAM FOR BREAST CANCER: ICD-10-CM

## 2018-11-01 DIAGNOSIS — Z00.00 PHYSICAL EXAM: Primary | ICD-10-CM

## 2018-11-01 DIAGNOSIS — Z12.11 COLON CANCER SCREENING: ICD-10-CM

## 2018-11-01 DIAGNOSIS — Z98.84 HISTORY OF GASTRIC BYPASS: ICD-10-CM

## 2018-11-01 DIAGNOSIS — Z00.00 WELLNESS EXAMINATION: ICD-10-CM

## 2018-11-01 DIAGNOSIS — G47.30 SLEEP APNEA, UNSPECIFIED TYPE: ICD-10-CM

## 2018-11-01 DIAGNOSIS — E78.00 PURE HYPERCHOLESTEROLEMIA: ICD-10-CM

## 2018-11-01 DIAGNOSIS — E88.89 STEATOSIS: ICD-10-CM

## 2018-11-01 DIAGNOSIS — Z01.419 ENCOUNTER FOR ANNUAL ROUTINE GYNECOLOGICAL EXAMINATION: ICD-10-CM

## 2018-11-01 DIAGNOSIS — K76.0 NAFL (NONALCOHOLIC FATTY LIVER): ICD-10-CM

## 2018-11-01 LAB
25(OH)D3+25(OH)D2 SERPL-MCNC: 53 NG/ML
ALBUMIN SERPL BCP-MCNC: 4.2 G/DL
ALP SERPL-CCNC: 84 U/L
ALT SERPL W/O P-5'-P-CCNC: 36 U/L
ANION GAP SERPL CALC-SCNC: 7 MMOL/L
AST SERPL-CCNC: 28 U/L
BASOPHILS # BLD AUTO: 0.03 K/UL
BASOPHILS NFR BLD: 0.7 %
BILIRUB SERPL-MCNC: 0.5 MG/DL
BUN SERPL-MCNC: 15 MG/DL
CALCIUM SERPL-MCNC: 9.5 MG/DL
CHLORIDE SERPL-SCNC: 104 MMOL/L
CHOLEST SERPL-MCNC: 199 MG/DL
CHOLEST/HDLC SERPL: 2.8 {RATIO}
CO2 SERPL-SCNC: 31 MMOL/L
CREAT SERPL-MCNC: 0.7 MG/DL
DIFFERENTIAL METHOD: NORMAL
EOSINOPHIL # BLD AUTO: 0.3 K/UL
EOSINOPHIL NFR BLD: 6.6 %
ERYTHROCYTE [DISTWIDTH] IN BLOOD BY AUTOMATED COUNT: 12.6 %
EST. GFR  (AFRICAN AMERICAN): >60 ML/MIN/1.73 M^2
EST. GFR  (NON AFRICAN AMERICAN): >60 ML/MIN/1.73 M^2
FERRITIN SERPL-MCNC: 75 NG/ML
GLUCOSE SERPL-MCNC: 86 MG/DL
HCT VFR BLD AUTO: 42.4 %
HDLC SERPL-MCNC: 70 MG/DL
HDLC SERPL: 35.2 %
HGB BLD-MCNC: 13.9 G/DL
IRON SERPL-MCNC: 128 UG/DL
LDLC SERPL CALC-MCNC: 114 MG/DL
LYMPHOCYTES # BLD AUTO: 1.6 K/UL
LYMPHOCYTES NFR BLD: 38.8 %
MAGNESIUM SERPL-MCNC: 2.2 MG/DL
MCH RBC QN AUTO: 30.2 PG
MCHC RBC AUTO-ENTMCNC: 32.8 G/DL
MCV RBC AUTO: 92 FL
MONOCYTES # BLD AUTO: 0.4 K/UL
MONOCYTES NFR BLD: 9 %
NEUTROPHILS # BLD AUTO: 1.8 K/UL
NEUTROPHILS NFR BLD: 44.4 %
NONHDLC SERPL-MCNC: 129 MG/DL
PLATELET # BLD AUTO: 269 K/UL
PMV BLD AUTO: 10.1 FL
POTASSIUM SERPL-SCNC: 4.1 MMOL/L
PROT SERPL-MCNC: 6.6 G/DL
RBC # BLD AUTO: 4.6 M/UL
SATURATED IRON: 37 %
SODIUM SERPL-SCNC: 142 MMOL/L
TOTAL IRON BINDING CAPACITY: 349 UG/DL
TRANSFERRIN SERPL-MCNC: 236 MG/DL
TRIGL SERPL-MCNC: 75 MG/DL
TSH SERPL DL<=0.005 MIU/L-ACNC: 2.59 UIU/ML
VIT B12 SERPL-MCNC: 1621 PG/ML
WBC # BLD AUTO: 4.12 K/UL

## 2018-11-01 PROCEDURE — 80053 COMPREHEN METABOLIC PANEL: CPT

## 2018-11-01 PROCEDURE — 80061 LIPID PANEL: CPT

## 2018-11-01 PROCEDURE — 84425 ASSAY OF VITAMIN B-1: CPT

## 2018-11-01 PROCEDURE — 82607 VITAMIN B-12: CPT

## 2018-11-01 PROCEDURE — 82728 ASSAY OF FERRITIN: CPT

## 2018-11-01 PROCEDURE — 99396 PREV VISIT EST AGE 40-64: CPT | Mod: S$GLB,,, | Performed by: INTERNAL MEDICINE

## 2018-11-01 PROCEDURE — 82306 VITAMIN D 25 HYDROXY: CPT

## 2018-11-01 PROCEDURE — 84443 ASSAY THYROID STIM HORMONE: CPT

## 2018-11-01 PROCEDURE — 83735 ASSAY OF MAGNESIUM: CPT

## 2018-11-01 PROCEDURE — 83540 ASSAY OF IRON: CPT

## 2018-11-01 PROCEDURE — 99999 PR PBB SHADOW E&M-EST. PATIENT-LVL IV: CPT | Mod: PBBFAC,,, | Performed by: INTERNAL MEDICINE

## 2018-11-01 PROCEDURE — 85025 COMPLETE CBC W/AUTO DIFF WBC: CPT

## 2018-11-01 NOTE — PROGRESS NOTES
Subjective:      Patient ID: Ayah Ceron is a 57 y.o. female.    Chief Complaint: Annual Exam    HPI:  HPI   Patient is here for her annual exam:    Annual exam:11/1/2018  Colonoscopy: 2012 follow up in 7 years no family of personal history of colon polyps: Fit card given  Mammogram: scheduled   Gyn: hysterectomy and oophorectomy: due  Optho:Dr. Suarez due 1/2018  Flu: done   Tetanus:10/14/2014  Shingrix:  Pneumovax: due 67  Prevnar: due 66  Bone Density:: not due        Consultants  Bariatric Surgery follow up:Brittaney: every 2 years  Hepatology : Dr. Leiva no follow up needed  Dr. Suarez yearly  Dr. Blanchard:rectocele surgery  Dr. Hurtado: Sleep apnea:patient uses sleep apnea equipment            Patient Active Problem List   Diagnosis    Functional dyspareunia    Hyperlipemia    Steatosis    Deviated septum    Sinus problem    History of gastric bypass    Vaginal relaxation    Incomplete defecation    Rectocele    Sleep apnea    Atrophic vaginitis    NAFL (nonalcoholic fatty liver)     Past Medical History:   Diagnosis Date    Anxiety     situational    Deviated septum     Fibroid     Hyperlipemia     Liver disease     fatty liver    PONV (postoperative nausea and vomiting)     Sleep apnea     cpap pressure 8    Steatosis      Past Surgical History:   Procedure Laterality Date    CARPAL TUNNEL RELEASE      DILATION AND CURETTAGE OF UTERUS      GASTRIC BYPASS  04/25/2011    HEMORRHOID SURGERY      HYSTERECTOMY      TV BSO    LASIK      10/26/03    REPAIR-POSTERIOR N/A 11/2/2015    [facility]    TONSILLECTOMY, ADENOIDECTOMY      TUBAL LIGATION      WISDOM TOOTH EXTRACTION       Family History   Problem Relation Age of Onset    Hypothyroidism Mother         had thyroidectomy    Hypertension Father     Arthritis Father     Hyperlipidemia Father     Eczema Daughter     Acne Daughter     Acne Son     Breast cancer Paternal Aunt     Breast cancer Paternal  "Grandmother     Psoriasis Neg Hx     Lupus Neg Hx     Ovarian cancer Neg Hx     Cervical cancer Neg Hx     Endometrial cancer Neg Hx     Vaginal cancer Neg Hx      Review of Systems   Constitutional: Negative for chills, fever and unexpected weight change.   HENT: Negative for trouble swallowing.    Respiratory: Negative for cough, shortness of breath and wheezing.    Cardiovascular: Negative for chest pain and palpitations.   Gastrointestinal: Negative for abdominal distention, abdominal pain, blood in stool and vomiting.   Musculoskeletal: Negative for back pain.   Neurological:        Right temple headaches     Objective:     Vitals:    11/01/18 1115   BP: 112/80   Pulse: 74   SpO2: 99%   Weight: 64.1 kg (141 lb 5 oz)   Height: 5' 4" (1.626 m)   PainSc: 0-No pain     Body mass index is 24.26 kg/m².  Physical Exam   Constitutional: She is oriented to person, place, and time. She appears well-developed and well-nourished.   HENT:   Right Ear: Tympanic membrane, external ear and ear canal normal.   Left Ear: Tympanic membrane, external ear and ear canal normal.   Nose: Nose normal.   Mouth/Throat: Uvula is midline.   Eyes: Conjunctivae and EOM are normal.   Neck: Carotid bruit is not present. No thyromegaly present.   Cardiovascular: Normal rate and regular rhythm.   Pulmonary/Chest: Effort normal. No respiratory distress.   Abdominal: Soft. Normal appearance and bowel sounds are normal. There is no hepatomegaly.   Musculoskeletal: Normal range of motion.   Lymphadenopathy:     She has no cervical adenopathy.     She has no axillary adenopathy. No inguinal adenopathy noted on the right or left side.   Neurological: She is alert and oriented to person, place, and time.   Skin: Skin is warm and dry. No rash noted.     Assessment:     1. Physical exam    2. Wellness examination    3. Encounter for screening mammogram for breast cancer    4. Colon cancer screening    5. Encounter for annual routine gynecological " examination    6. History of gastric bypass    7. Pure hypercholesterolemia    8. Steatosis    9. Sleep apnea, unspecified type    10. NAFL (nonalcoholic fatty liver)      Plan:   Ayah DUVALL was seen today for annual exam.    Diagnoses and all orders for this visit:    Physical exam: updated and reviewed    Wellness examination    Encounter for screening mammogram for breast cancer    Colon cancer screening  -     Fecal Immunochemical Test (iFOBT); Future    Encounter for annual routine gynecological examination  -     Ambulatory consult to Gynecology    History of gastric bypass  -     Vitamin D; Future  -     Vitamin B12; Future  -     TSH; Future  -     Iron and TIBC; Future  -     Ferritin; Future  -     Lipid panel; Future  -     CBC auto differential; Future  -     Comprehensive metabolic panel; Future  -     Vitamin B1; Future  -     Magnesium; Future    Pure hypercholesterolemia:will check lab    Steatosis: diet controlled    Sleep apnea, unspecified type            Problem List Items Addressed This Visit     Hyperlipemia    Steatosis    History of gastric bypass    Relevant Orders    Vitamin D    Vitamin B12    TSH    Iron and TIBC    Ferritin    Lipid panel    CBC auto differential    Comprehensive metabolic panel    Vitamin B1    Magnesium    Sleep apnea    NAFL (nonalcoholic fatty liver)      Other Visit Diagnoses     Physical exam    -  Primary    Wellness examination        Encounter for screening mammogram for breast cancer        Colon cancer screening        Relevant Orders    Fecal Immunochemical Test (iFOBT)    Encounter for annual routine gynecological examination        Relevant Orders    Ambulatory consult to Gynecology        Orders Placed This Encounter   Procedures    Fecal Immunochemical Test (iFOBT)     Standing Status:   Future     Standing Expiration Date:   12/31/2019    Vitamin D     Standing Status:   Future     Standing Expiration Date:   12/31/2018    Vitamin B12     Standing Status:    Future     Standing Expiration Date:   12/31/2019    TSH     Standing Status:   Future     Standing Expiration Date:   12/31/2018    Iron and TIBC     Standing Status:   Future     Standing Expiration Date:   11/1/2019    Ferritin     Standing Status:   Future     Standing Expiration Date:   12/31/2019    Lipid panel     Standing Status:   Future     Standing Expiration Date:   12/31/2018    CBC auto differential     Standing Status:   Future     Standing Expiration Date:   12/31/2018    Comprehensive metabolic panel     Standing Status:   Future     Standing Expiration Date:   12/31/2018    Vitamin B1     Standing Status:   Future     Standing Expiration Date:   12/31/2019    Magnesium     Standing Status:   Future     Standing Expiration Date:   12/31/2019    Ambulatory consult to Gynecology     Referral Priority:   Routine     Referral Type:   Consultation     Referral Reason:   Specialty Services Required     Requested Specialty:   Gynecology     Number of Visits Requested:   1     No Follow-up on file.     Medication List           Accurate as of 11/1/18 12:07 PM. If you have any questions, ask your nurse or doctor.               CONTINUE taking these medications    calcium citrate-vitamin D3 500 mg calcium -400 unit Chew     conjugated estrogens vaginal cream  Commonly known as:  PREMARIN  Place 0.5 g vaginally twice a week.     desloratadine 5 mg tablet  Commonly known as:  CLARINEX  Take 1 tablet (5 mg total) by mouth once daily.     docusate sodium 100 MG capsule  Commonly known as:  COLACE     MAG-OXIDE ORAL     multivitamin capsule     SUPER B COMPLEX ORAL     VITAMIN B-12 500 MCG tablet  Generic drug:  cyanocobalamin

## 2018-11-01 NOTE — PATIENT INSTRUCTIONS
Consider:  New shingles vaccine: SHINGRIX ( 2018) not live, 90%,  2 shots, one at day zero and the 2nd at 2-6 months: any pharmacy can give it.

## 2018-11-06 ENCOUNTER — HOSPITAL ENCOUNTER (OUTPATIENT)
Dept: RADIOLOGY | Facility: HOSPITAL | Age: 58
Discharge: HOME | End: 2018-11-06
Attending: INTERNAL MEDICINE
Payer: COMMERCIAL

## 2018-11-06 DIAGNOSIS — Z12.31 ENCOUNTER FOR SCREENING MAMMOGRAM FOR BREAST CANCER: ICD-10-CM

## 2018-11-06 LAB — VIT B1 SERPL-MCNC: 72 UG/L (ref 38–122)

## 2018-11-06 PROCEDURE — 77063 BREAST TOMOSYNTHESIS BI: CPT | Mod: TC

## 2018-11-06 PROCEDURE — 77063 BREAST TOMOSYNTHESIS BI: CPT | Mod: 26,,, | Performed by: RADIOLOGY

## 2018-11-06 PROCEDURE — 77067 SCR MAMMO BI INCL CAD: CPT | Mod: 26,,, | Performed by: RADIOLOGY

## 2018-11-07 ENCOUNTER — LAB VISIT (OUTPATIENT)
Dept: LAB | Facility: HOSPITAL | Age: 58
End: 2018-11-07
Attending: INTERNAL MEDICINE
Payer: COMMERCIAL

## 2018-11-07 DIAGNOSIS — Z12.11 COLON CANCER SCREENING: ICD-10-CM

## 2018-11-07 LAB — HEMOCCULT STL QL IA: NEGATIVE

## 2018-11-07 PROCEDURE — 82274 ASSAY TEST FOR BLOOD FECAL: CPT

## 2018-12-04 ENCOUNTER — OFFICE VISIT (OUTPATIENT)
Dept: OBSTETRICS AND GYNECOLOGY | Facility: CLINIC | Age: 58
End: 2018-12-04
Payer: COMMERCIAL

## 2018-12-04 VITALS
DIASTOLIC BLOOD PRESSURE: 74 MMHG | HEIGHT: 64 IN | SYSTOLIC BLOOD PRESSURE: 104 MMHG | BODY MASS INDEX: 23.9 KG/M2 | WEIGHT: 140 LBS

## 2018-12-04 DIAGNOSIS — N95.9 MENOPAUSAL AND PERIMENOPAUSAL DISORDER: ICD-10-CM

## 2018-12-04 DIAGNOSIS — N95.2 ATROPHIC VAGINITIS: ICD-10-CM

## 2018-12-04 DIAGNOSIS — Z01.419 VISIT FOR GYNECOLOGIC EXAMINATION: Primary | ICD-10-CM

## 2018-12-04 PROCEDURE — 99396 PREV VISIT EST AGE 40-64: CPT | Mod: S$GLB,,, | Performed by: OBSTETRICS & GYNECOLOGY

## 2018-12-04 PROCEDURE — 99999 PR PBB SHADOW E&M-EST. PATIENT-LVL III: CPT | Mod: PBBFAC,,, | Performed by: OBSTETRICS & GYNECOLOGY

## 2018-12-04 NOTE — LETTER
December 4, 2018      Elena Ortiz MD  [address]  [facility] [address]           [facility] 5th Floor  [address]  [facility] [address]  Phone: [phone]          Patient: Ayah Ceron   MR Number: [id number]   YOB: 1960   Date of Visit: 12/4/2018       Dear Dr. Elena Ortiz:    Thank you for referring Ayah Ceron to me for evaluation. Attached you will find relevant portions of my assessment and plan of care.    If you have questions, please do not hesitate to call me. I look forward to following Ayah Ceron along with you.    Sincerely,    Clarice Gutierrez MD    Enclosure  CC:  No Recipients    If you would like to receive this communication electronically, please contact [email or web address] or [phone] to request more information on [employer] [facility] access.    For providers and/or their staff who would like to refer a patient to [facility], please contact us through our one-stop-shop provider referral line, [facility] [place], at [phone].    If you feel you have received this communication in error or would no longer like to receive these types of communications, please e-mail [email or web address]

## 2018-12-04 NOTE — PROGRESS NOTES
HISTORY OF PRESENT ILLNESS:    Ayah Ceron is a 57 y.o. female,   presents today for her routine visit and has no complaints.  PAP NOT INDICATED AND MAMMO UP TO DATE.  REFILL PREMARIN VAGINAL CREAM IF DESIRED, AT THIS TIME REALLY NOT USING AND REALLY NOT SEXUALLY ACTIVE.  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  I SEE HER DAUGHTER LELE WHO IS IN NURSING SCHOOL    LAST VISIT KNOE 2015:  1. Symptomatic menopausal or female climacteric states    2. Rectocele    3. Incomplete defecation    4. Vaginal relaxation    Initial Plan  The patient was counseled regarding these issues. She was given a summary sheet containing each of these issues with possible options for evaluation and management. We also reviewed computer-generated diagrams specific to her pelvic organ prolapse quantification findings and she was given a copy of this for her records.  All her questions were addressed to her satisfaction.  1)  Vaginal redundancy/rectocele, difficult intercourse:  --attempt to decreased dryness:  Use 1 gram of estrogen cream in vagina nightly x 2 weeks, then 3x a week thereafter.  May also use  REPLENS or REFRESH OTC as needed or during intercourse.    --consider contribution rectocele/laxity:  Plan rectocele repair if needed.   2)  Incomplete defecation:  --consider contribution of rectocele  Controlling constipation may help bladder urgency/leakage and fiber may better control cholesterol and blood glucose.  Start daily fiber.  Take 1 tsp of fiber powder (psyllium or other sugar-free powder).  Mix in 8 oz of water.  Take x 3-5 days.  Then, increase fiber by 1 tsp every 3-5 days until stool is easy to pass--not too soft, not too hard.  Stop and continue at that dose.   Do not exceed 6 tsps/day.  May also use over the counter stool softener 1-2 x/day.  AVOID  "laxatives.  May or may not help stool trapping.    LAST VISIT 2014:  NEEDS REFILL ERT; MAMMO IS UP TO DATE AND PAP NOT INDICATED.  STILL USING VAGINAL CREAM FOR DRYNESS -  STILL FEELS LIKE HE'S HITTING A WALL - - WILL REF TO GYN UROLOGY RE THEIR OPINION POSS VAGINAL DILATORS NEEDED?  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 .    LAST VISIT 7/2012:  F/U DYSPAREUNIA, VAGINAL NARROWING, AND DIFFICULTY OF HER  PENETRATING TO HAVE INTERCOURSE.   Last seen 6/13/2012, REFERRED BY HER PCP PCP C/O DIFFICULTIES HAVING VAGINAL INTERCOURSE. REPORTS THAT HER VAGINAL CANAL SEEMS TO BE "CLOSING" AND HER  HAS DIFFICULTY WITH PENETRATION. ALSO HAS OCCASIONAL BLEEDING WITH INTERCOURSE. USE OF VAGINAL LUBRICANTS DOES NOT HELP. ON EXAM, VAGINAL CANAL SEEM SOMEWHAT ATROPHIC WITH MILD SCARRING AT THE VAGINAL APEX. THERE WAS NO LICHENIFICATION, AND 1 FINGER ADMITTED WITH NO DIFFICULTY (INTROITUS IS SLIGHTLY NARROW FOR 2 FINGERS). CANAL LENGTH IS AT LEAST 8-9CM AND PT REASSURED THAT THERE IS NO SIGNIF RESTRICTION OF THE VAGINA. ADVISED RE POSS BENEFICIAL EFFECTS OF ERT ON VAGINAL MUCOSA AND WILL RX PREMARIN VAGINAL CREAM. ADVISED TO APPLY VAGINALLY HS, AND F/U IN 6-8 WEEKS TO SEE EFFECTS OF THERAPY. IF UNIMPROVED SYMPTOMATICALLY OR RESTRICTION OF VAGINAL CANAL SEEMS EVIDENT ON NEXT VISIT, MAY NEED VAGINAL WALL BIOPSY FOR EVALUATION OF LICHEN SCLEROSUS/ PLANUS. AT THIS TIME, DO NOT NOTE LOSS OF VULVAR ARCHITECTURE  AFTER ERT, HAS LESS PAIN -  CAN PENETRATE BUT HAS DIFFICULTY INSERTING FULLY. "OPENING SEEMS LOOSER." REASSURED THAT HER EXAM SHOWS AN ENTIRELY NORMAL VAGINA. DISCUSS VAG LUBRICANTS, SEXUAL POSITIONS, AND SOME CUTANEOUS IRRITATION ON 'S PENIS AFTER SEX - ?YEAST INFECTION. HE MAY BENEFIT FROM SOME LOTRIMIN CREAM . .   OTHER ISSUES OF  STILL OBESE AFTER TWO FAILED BARIATRIC SURGERIES BUT HIS WIFE'S GOOD SURGICAL RESULT. COUNSELED FOR AT LEAST 20 MINS.     Past " Medical History:   Diagnosis Date    Anxiety     situational    Deviated septum     Fibroid     Hyperlipemia     Liver disease     fatty liver    PONV (postoperative nausea and vomiting)     Sleep apnea     cpap pressure 8    Steatosis        Past Surgical History:   Procedure Laterality Date    CARPAL TUNNEL RELEASE      DILATION AND CURETTAGE OF UTERUS      GASTRIC BYPASS  04/25/2011    HEMORRHOID SURGERY      HYSTERECTOMY      TVH BSO    LASIK      10/26/03    REPAIR-POSTERIOR N/A 11/2/2015    [facility]    TONSILLECTOMY, ADENOIDECTOMY      TUBAL LIGATION      WISDOM TOOTH EXTRACTION         MEDICATIONS AND ALLERGIES:      Current Outpatient Medications:     calcium citrate-vitamin D3 500 mg calcium -400 unit Chew, Take by mouth. 2 tablets once per day, Disp: , Rfl:     [START ON 12/6/2018] conjugated estrogens (PREMARIN) vaginal cream, Place 0.5 g vaginally twice a week., Disp: 30 g, Rfl: 11    cyanocobalamin (VITAMIN B-12) 500 MCG tablet, Take 500 mcg by mouth once daily. sublingual , Disp: , Rfl:     desloratadine (CLARINEX) 5 mg tablet, Take 1 tablet (5 mg total) by mouth once daily., Disp: 90 tablet, Rfl: 3    docusate sodium (COLACE) 100 MG capsule, Take 100 mg by mouth 2 (two) times daily., Disp: , Rfl:     FERROUS FUMARATE/VIT BCOMP&C (SUPER B COMPLEX ORAL), Take by mouth., Disp: , Rfl:     MAGNESIUM OXIDE (MAG-OXIDE ORAL), Take by mouth. Try to get 400-500 a day, Disp: , Rfl:     multivitamin capsule, Take 1 capsule by mouth 2 (two) times daily.  , Disp: , Rfl:     Review of patient's allergies indicates:   Allergen Reactions    Augmentin [amoxicillin-pot clavulanate]      Abdominal cramping       Family History   Problem Relation Age of Onset    Hypothyroidism Mother         had thyroidectomy    Hypertension Father     Arthritis Father     Hyperlipidemia Father     Eczema Daughter     Acne Daughter     Acne Son     Breast cancer  Paternal Aunt     Breast cancer Paternal Grandmother     Psoriasis Neg Hx     Lupus Neg Hx     Ovarian cancer Neg Hx     Cervical cancer Neg Hx     Endometrial cancer Neg Hx     Vaginal cancer Neg Hx        Social History     Socioeconomic History    Marital status:      Spouse name: Not on file    Number of children: Not on file    Years of education: Not on file    Highest education level: Not on file   Social Needs    Financial resource strain: Not on file    Food insecurity - worry: Not on file    Food insecurity - inability: Not on file    Transportation needs - medical: Not on file    Transportation needs - non-medical: Not on file   Occupational History    Occupation: RN     Employer: [employer]   Tobacco Use    Smoking status: Never Smoker    Smokeless tobacco: Never Used   Substance and Sexual Activity    Alcohol use: No    Drug use: No    Sexual activity: Yes     Partners: Male     Birth control/protection: Surgical   Other Topics Concern    Are you pregnant or think you may be? No    Breast-feeding No   Social History Narrative    Not on file       COMPREHENSIVE GYN HISTORY:  PAP History: Denies abnormal Paps except as noted above.  Infection History: Denies STDs. Denies PID.  Benign History: Denies uterine fibroids. Denies ovarian cysts. Denies endometriosis. Denies other conditions.  Cancer History: Denies cervical cancer. Denies uterine cancer or hyperplasia. Denies ovarian cancer. Denies vulvar cancer or pre-cancer. Denies vaginal cancer or pre-cancer. Denies breast cancer. Denies colon cancer.  Menstrual History: Denies menses.     ROS:  GENERAL: No weight changes. No swelling. No fatigue. No fever.  CARDIOVASCULAR: No chest pain. No shortness of breath. No leg cramps.   NEUROLOGICAL: No headaches. No vision changes.  BREASTS: No pain. No lumps. No discharge.  ABDOMEN: No pain. No nausea. No vomiting. No diarrhea. No constipation.  REPRODUCTIVE: No  "abnormal bleeding.  VULVA: No pain. No lesions. No itching.  VAGINA: No relaxation. No itching. No odor. No discharge. No lesions.  URINARY: No incontinence. No nocturia. No frequency. No dysuria.    /74   Ht 5' 4" (1.626 m)   Wt 63.5 kg (140 lb)   BMI 24.03 kg/m²     PE:  APPEARANCE: Well nourished, well developed, in no acute distress.  AFFECT: WNL, alert and oriented x 3.  SKIN: No acne or hirsutism.  NECK: Neck symmetric without masses or thyromegaly.  NODES: No inguinal, cervical, axillary or femoral lymph node enlargement.  CHEST: Good respiratory effort.   ABDOMEN: Soft. No tenderness or masses. No hepatosplenomegaly. No hernias.  BREASTS: Symmetrical, no skin changes or visible lesions. No palpable masses, nipple discharge bilaterally.  PELVIC: ATROPHIC EXTERNAL FEMALE GENITALIA without lesions. Normal hair distribution. Adequate perineal body, normal urethral meatus. VAGINA DRY without lesions or discharge. No significant cystocele or rectocele. Bimanual exam shows CERVIX and UTERUS to be SURGICALLY ABSENT. Adnexa without masses or tenderness.  EXTREMITIES: No edema.    DIAGNOSIS:  1. Visit for gynecologic examination     2. Menopausal and perimenopausal disorder     3. Atrophic vaginitis  conjugated estrogens (PREMARIN) vaginal cream       LABS AND TESTS ORDERED:  Mammogram    MEDICATIONS PRESCRIBED:    COUNSELING:  The patient was counseled today on osteoporosis prevention, calcium supplementation, and regular weight bearing exercise. The patient was also counseled today on ACS PAP guidelines, with recommendations for yearly pelvic exams unless their uterus, cervix, and ovaries were removed for benign reasons; in that case, examinations every 3-5 years are recommended.  The patient was also counseled regarding monthly breast self-examination, routine STD screening for at-risk populations, prophylactic immunizations for transmitted infections such as  HPV, Pertussis, or Influenza as appropriate, " and yearly mammograms when indicated by ACS guidelines.  She was advised to see her primary care physician for all other health maintenance.  FOLLOW-UP with me for next routine visit.

## 2018-12-14 RX ORDER — DESLORATADINE 5 MG/1
5 TABLET ORAL DAILY
Qty: 90 TABLET | Refills: 3 | Status: SHIPPED | OUTPATIENT
Start: 2018-12-14 | End: 2019-12-19

## 2018-12-22 ENCOUNTER — NURSE TRIAGE (OUTPATIENT)
Dept: ADMINISTRATIVE | Facility: CLINIC | Age: 58
End: 2018-12-22

## 2018-12-22 ENCOUNTER — HOSPITAL ENCOUNTER (EMERGENCY)
Facility: HOSPITAL | Age: 58
Discharge: HOME | End: 2018-12-22
Attending: EMERGENCY MEDICINE
Payer: COMMERCIAL

## 2018-12-22 VITALS
RESPIRATION RATE: 20 BRPM | TEMPERATURE: 98 F | OXYGEN SATURATION: 100 % | SYSTOLIC BLOOD PRESSURE: 141 MMHG | HEART RATE: 74 BPM | DIASTOLIC BLOOD PRESSURE: 88 MMHG

## 2018-12-22 DIAGNOSIS — H00.022 HORDEOLUM INTERNUM OF RIGHT LOWER EYELID: Primary | ICD-10-CM

## 2018-12-22 PROCEDURE — 99283 EMERGENCY DEPT VISIT LOW MDM: CPT

## 2018-12-22 PROCEDURE — 99282 EMERGENCY DEPT VISIT SF MDM: CPT | Mod: ,,, | Performed by: PHYSICIAN ASSISTANT

## 2018-12-22 RX ORDER — ERYTHROMYCIN 5 MG/G
OINTMENT OPHTHALMIC
Qty: 1 TUBE | Refills: 0 | Status: SHIPPED | OUTPATIENT
Start: 2018-12-22 | End: 2019-01-24 | Stop reason: ALTCHOICE

## 2018-12-22 RX ORDER — ERYTHROMYCIN 5 MG/G
OINTMENT OPHTHALMIC
Qty: 1 TUBE | Refills: 0 | Status: SHIPPED | OUTPATIENT
Start: 2018-12-22 | End: 2018-12-22 | Stop reason: SDUPTHER

## 2018-12-23 NOTE — ED PROVIDER NOTES
Encounter Date: 12/22/2018       History     Chief Complaint   Patient presents with    Eye Pain     right eye pain     57 yo female presents for evaluation of right eye discomfort she states started about a day ago. She denies trauma. She states she had some yellowish discharge this morning and associated irritation. She denies any significant pain or visual disturbances.           Review of patient's allergies indicates:   Allergen Reactions    Augmentin [amoxicillin-pot clavulanate]      Abdominal cramping     Past Medical History:   Diagnosis Date    Anxiety     situational    Deviated septum     Fibroid     Hyperlipemia     Liver disease     fatty liver    PONV (postoperative nausea and vomiting)     Sleep apnea     cpap pressure 8    Steatosis      Past Surgical History:   Procedure Laterality Date    CARPAL TUNNEL RELEASE      DILATION AND CURETTAGE OF UTERUS      GASTRIC BYPASS  04/25/2011    HEMORRHOID SURGERY      HYSTERECTOMY      [facility]H BSO    LASIK      10/26/03    REPAIR-POSTERIOR N/A 11/2/2015    [facility]    TONSILLECTOMY, ADENOIDECTOMY      TUBAL LIGATION      WISDOM TOOTH EXTRACTION       Family History   Problem Relation Age of Onset    Hypothyroidism Mother         had thyroidectomy    Hypertension Father     Arthritis Father     Hyperlipidemia Father     Eczema Daughter     Acne Daughter     Acne Son     Breast cancer Paternal Aunt     Breast cancer Paternal Grandmother     Psoriasis Neg Hx     Lupus Neg Hx     Ovarian cancer Neg Hx     Cervical cancer Neg Hx     Endometrial cancer Neg Hx     Vaginal cancer Neg Hx      Social History     Tobacco Use    Smoking status: Never Smoker    Smokeless tobacco: Never Used   Substance Use Topics    Alcohol use: No    Drug use: No     Review of Systems   Constitutional: Negative for fever.   HENT: Negative for sore throat.    Eyes: Positive for discharge and redness. Negative for  photophobia, pain, itching and visual disturbance.   Respiratory: Negative for shortness of breath.    Cardiovascular: Negative for chest pain.   Gastrointestinal: Negative for nausea.   Genitourinary: Negative for dysuria.   Musculoskeletal: Negative for back pain.   Skin: Negative for rash.   Neurological: Negative for weakness.   Hematological: Does not bruise/bleed easily.       Physical Exam     Initial Vitals [12/22/18 1821]   BP Pulse Resp Temp SpO2   (!) 141/88 74 20 98.2 °F (36.8 °C) 100 %      MAP       --         Physical Exam    Nursing note and vitals reviewed.  Constitutional: She appears well-developed and well-nourished.   HENT:   Head: Normocephalic and atraumatic.   Right Ear: External ear normal.   Left Ear: External ear normal.   Nose: Nose normal.   Mouth/Throat: Oropharynx is clear and moist.   Eyes: EOM and lids are normal. Pupils are equal, round, and reactive to light. Lids are everted and swept, no foreign bodies found. Right eye exhibits discharge. Left eye exhibits no discharge. Right conjunctiva is injected. Right conjunctiva has no hemorrhage. Left conjunctiva is not injected. Left conjunctiva has no hemorrhage. No scleral icterus.   + sty noted to the right inner lower eye lid.    Neck: Normal range of motion. Neck supple.   Cardiovascular: Normal rate, regular rhythm, normal heart sounds and intact distal pulses.   Pulmonary/Chest: Breath sounds normal.   Abdominal: Soft. Bowel sounds are normal.   Musculoskeletal: Normal range of motion.   Neurological: She is alert and oriented to person, place, and time. She has normal strength and normal reflexes. GCS score is 15. GCS eye subscore is 4. GCS verbal subscore is 5. GCS motor subscore is 6.   Skin: Skin is warm and dry. Capillary refill takes less than 2 seconds.   Psychiatric: She has a normal mood and affect. Thought content normal.         ED Course   Procedures  Labs Reviewed - No data to display       Imaging Results    None           Medical Decision Making:   Initial Assessment:   Pt. Seen and examined at the bedside. She is well appearing, sitting comfortably in no acute distress.   Differential Diagnosis:   Sty vs conjunctivitis   ED Management:  Pt. Has a small sty to the right lower inner eye lid which we will treat. She was given instructions to follow up with her PCP.   Other:   I have discussed this case with another health care provider.              Attending Attestation:     Physician Attestation Statement for NP/PA:   I discussed this assessment and plan of this patient with the NP/PA, but I did not personally examine the patient. The face to face encounter was performed by the NP/PA.                     Clinical Impression:   The encounter diagnosis was Hordeolum internum of right lower eyelid.      Disposition:   Disposition: Discharged  Condition: Stable                        Kervin Perez PA-C  12/22/18 1858       Stepan Stern III, MD  12/25/18 [id number]

## 2018-12-23 NOTE — ED NOTES
"Pt identifiers checked and accurate with Ayah Ceron     Pt reports to ED with complaints of right lower eyelid tenderness beginning yesterday. Pt reports putting on makeup yesterday morning, noticing tenderness to lower eyelid with touch, pain occurred again that evening when applying makeup again. Pt reports bruising to right eye this AM, pt states "ulcer to inner bottom eyelid, my eye was stuck shut but when I blink a lot it opens back up", "blurry vision on and off, after blinking a few times it's clear again". Pt denies headache, double vision, eye drainage, fever, chills.     LOC: The patient is awake, alert and aware of environment with an appropriate affect, the patient is oriented x 3 and speaking appropriately.  APPEARANCE: Patient resting comfortably and in no acute distress, patient is clean and well groomed  SKIN: The skin is warm and dry, color consistent with ethnicity, skin intact, no breakdown. Pt presents with redness to right bottom eyelid, no visible bruising noted, white ulcer-like formation to inner right bottom eyelid.    MUSCULOSKELETAL: Patient moving all extremities well, no obvious swelling or deformities noted. Pt ambulates unassisted with steady gait.   RESPIRATORY: Airway is open and patent; respirations are spontaneous, patient has a normal effort and rate, no accessory muscle use noted.   ABDOMEN: Soft and non tender to palpation, no distention noted. Bowel sounds present x 4  NEUROLOGIC: PERRL, 3 mm bilaterally, eyes open spontaneously, behavior appropriate to situation, follows commands, purposeful motor response noted, normal sensation in all extremities when touched with a finger.    "

## 2019-01-24 ENCOUNTER — OFFICE VISIT (OUTPATIENT)
Dept: OPTOMETRY | Facility: CLINIC | Age: 59
End: 2019-01-24
Payer: COMMERCIAL

## 2019-01-24 DIAGNOSIS — Z01.00 EXAMINATION OF EYES AND VISION: Primary | ICD-10-CM

## 2019-01-24 DIAGNOSIS — H52.4 PRESBYOPIA: ICD-10-CM

## 2019-01-24 PROCEDURE — 92014 PR EYE EXAM, EST PATIENT,COMPREHESV: ICD-10-PCS | Mod: S$GLB,,, | Performed by: OPTOMETRIST

## 2019-01-24 PROCEDURE — 99999 PR PBB SHADOW E&M-EST. PATIENT-LVL II: CPT | Mod: PBBFAC,,, | Performed by: OPTOMETRIST

## 2019-01-24 PROCEDURE — 99999 PR PBB SHADOW E&M-EST. PATIENT-LVL II: ICD-10-PCS | Mod: PBBFAC,,, | Performed by: OPTOMETRIST

## 2019-01-24 PROCEDURE — 92014 COMPRE OPH EXAM EST PT 1/>: CPT | Mod: S$GLB,,, | Performed by: OPTOMETRIST

## 2019-01-25 NOTE — PROGRESS NOTES
HPI     Last eye exam was 1/22/18 with Dr. Suarez.  Patient states vision occasionally gets blurry but thinks its related to   dryness. Rarely uses OTC readers for very tiny print. Had stye RLL   12/22/18 and was given antibiotic ointment from ED and it went away.   Patient denies diplopia, headaches, flashes/floaters, and pain.      Last edited by Wendy Castro on 1/24/2019  3:32 PM. (History)            Assessment /Plan     For exam results, see Encounter Report.    Examination of eyes and vision    Presbyopia            1-2.  No rx given.  Continue with OTC readers as needed.  Eye health normal OU.  RTC 1 year for routine exam.

## 2019-04-03 ENCOUNTER — OFFICE VISIT (OUTPATIENT)
Dept: DERMATOLOGY | Facility: CLINIC | Age: 59
End: 2019-04-03
Payer: COMMERCIAL

## 2019-04-03 DIAGNOSIS — L82.1 SK (SEBORRHEIC KERATOSIS): ICD-10-CM

## 2019-04-03 DIAGNOSIS — L81.4 LENTIGINES: Primary | ICD-10-CM

## 2019-04-03 DIAGNOSIS — L72.11 PILAR CYST: ICD-10-CM

## 2019-04-03 DIAGNOSIS — D18.00 ANGIOMA: ICD-10-CM

## 2019-04-03 DIAGNOSIS — D22.9 MULTIPLE BENIGN NEVI: ICD-10-CM

## 2019-04-03 DIAGNOSIS — Z12.83 SKIN CANCER SCREENING: ICD-10-CM

## 2019-04-03 PROCEDURE — 99203 PR OFFICE/OUTPT VISIT, NEW, LEVL III, 30-44 MIN: ICD-10-PCS | Mod: S$GLB,,, | Performed by: DERMATOLOGY

## 2019-04-03 PROCEDURE — 99999 PR PBB SHADOW E&M-EST. PATIENT-LVL II: CPT | Mod: PBBFAC,,, | Performed by: DERMATOLOGY

## 2019-04-03 PROCEDURE — 99203 OFFICE O/P NEW LOW 30 MIN: CPT | Mod: S$GLB,,, | Performed by: DERMATOLOGY

## 2019-04-03 PROCEDURE — 99999 PR PBB SHADOW E&M-EST. PATIENT-LVL II: ICD-10-PCS | Mod: PBBFAC,,, | Performed by: DERMATOLOGY

## 2019-04-03 NOTE — PROGRESS NOTES
Subjective:       Patient ID:  Ayah Ceron is a 58 y.o. female who presents for   Chief Complaint   Patient presents with    Skin Cancer     Pt here today for TBSE.      HPI  Interested in total body skin check today.  No personal history of skin cancer or atypical moles.  Pt's mother has had NMSC.    Pt has noticed more spots on chest over the past few months. Asymptomatic and no prior treatment.    Review of Systems   Constitutional: Negative for fever, chills, weight loss, weight gain, fatigue, night sweats and malaise.   Skin: Positive for activity-related sunscreen use. Negative for daily sunscreen use.   Hematologic/Lymphatic: Does not bruise/bleed easily.        Objective:    Physical Exam   Constitutional: She appears well-developed and well-nourished. No distress.   Neurological: She is alert and oriented to person, place, and time. She is not disoriented.   Psychiatric: She has a normal mood and affect.   Skin:   Areas Examined (abnormalities noted in diagram):   Scalp / Hair Palpated and Inspected  Head / Face Inspection Performed  Neck Inspection Performed  Chest / Axilla Inspection Performed  Abdomen Inspection Performed  Genitals / Buttocks / Groin Inspection Performed  Back Inspection Performed  RUE Inspected  LUE Inspection Performed  RLE Inspected  LLE Inspection Performed  Nails and Digits Inspection Performed                   Diagram Legend     Erythematous scaling macule/papule c/w actinic keratosis       Vascular papule c/w angioma      Pigmented verrucoid papule/plaque c/w seborrheic keratosis      Yellow umbilicated papule c/w sebaceous hyperplasia      Irregularly shaped tan macule c/w lentigo     1-2 mm smooth white papules consistent with Milia      Movable subcutaneous cyst with punctum c/w epidermal inclusion cyst      Subcutaneous movable cyst c/w pilar cyst      Firm pink to brown papule c/w dermatofibroma      Pedunculated fleshy papule(s) c/w skin tag(s)      Evenly pigmented  macule c/w junctional nevus     Mildly variegated pigmented, slightly irregular-bordered macule c/w mildly atypical nevus      Flesh colored to evenly pigmented papule c/w intradermal nevus       Pink pearly papule/plaque c/w basal cell carcinoma      Erythematous hyperkeratotic cursted plaque c/w SCC      Surgical scar with no sign of skin cancer recurrence      Open and closed comedones      Inflammatory papules and pustules      Verrucoid papule consistent consistent with wart     Erythematous eczematous patches and plaques     Dystrophic onycholytic nail with subungual debris c/w onychomycosis     Umbilicated papule    Erythematous-base heme-crusted tan verrucoid plaque consistent with inflamed seborrheic keratosis     Erythematous Silvery Scaling Plaque c/w Psoriasis     See annotation      Assessment / Plan:        Lentigines  These are benign sun spots which should be monitored for changes. Patient instructed in importance of daily broad spectrum sunscreen use with spf at least 30. Sun avoidance and topical protection/protective clothing discussed.    Angioma  This is a benign vascular lesion. Reassurance given. No treatment required. Treatment of benign, asymptomatic lesions may be considered cosmetic.    SK (seborrheic keratosis)  These are benign inherited growths without a malignant potential. Reassurance given to patient. No treatment is necessary.   Treatment of benign, asymptomatic lesions may be considered cosmetic.  Warned about risk of hypo- or hyperpigmentation with treatment and risk of recurrence.    Multiple benign nevi  Benign-appearing on exam today. Counseled pt to monitor mole(s) and return to clinic if any changes noted or symptoms (bleeding, itching, pain, etc) noted. Brochure provided.    Pilar cyst  This is a benign cyst of the hair follicle. Reassurance provided. No treatment is necessary unless it is symptomatic.     Skin cancer screening  Total body skin examination performed today  including at least 12 points as noted in physical examination. No lesions suspicious for malignancy noted.  Patient instructed in importance of daily broad spectrum sunscreen use with spf at least 30. Sun avoidance and topical protection/protective clothing discussed.    Return to clinic in about 1 year or sooner for any concerns.

## 2019-04-03 NOTE — PATIENT INSTRUCTIONS

## 2019-07-01 ENCOUNTER — PATIENT OUTREACH (OUTPATIENT)
Dept: ADMINISTRATIVE | Facility: OTHER | Age: 59
End: 2019-07-01

## 2019-07-02 ENCOUNTER — OFFICE VISIT (OUTPATIENT)
Dept: SLEEP MEDICINE | Facility: CLINIC | Age: 59
End: 2019-07-02
Payer: COMMERCIAL

## 2019-07-02 VITALS
WEIGHT: 145.5 LBS | HEIGHT: 64 IN | DIASTOLIC BLOOD PRESSURE: 71 MMHG | HEART RATE: 66 BPM | BODY MASS INDEX: 24.84 KG/M2 | SYSTOLIC BLOOD PRESSURE: 111 MMHG

## 2019-07-02 DIAGNOSIS — G47.33 OBSTRUCTIVE SLEEP APNEA: Primary | ICD-10-CM

## 2019-07-02 PROCEDURE — 99999 PR PBB SHADOW E&M-EST. PATIENT-LVL III: ICD-10-PCS | Mod: PBBFAC,,, | Performed by: NURSE PRACTITIONER

## 2019-07-02 PROCEDURE — 99213 PR OFFICE/OUTPT VISIT, EST, LEVL III, 20-29 MIN: ICD-10-PCS | Mod: S$GLB,,, | Performed by: NURSE PRACTITIONER

## 2019-07-02 PROCEDURE — 3008F PR BODY MASS INDEX (BMI) DOCUMENTED: ICD-10-PCS | Mod: CPTII,S$GLB,, | Performed by: NURSE PRACTITIONER

## 2019-07-02 PROCEDURE — 99213 OFFICE O/P EST LOW 20 MIN: CPT | Mod: S$GLB,,, | Performed by: NURSE PRACTITIONER

## 2019-07-02 PROCEDURE — 3008F BODY MASS INDEX DOCD: CPT | Mod: CPTII,S$GLB,, | Performed by: NURSE PRACTITIONER

## 2019-07-02 PROCEDURE — 99999 PR PBB SHADOW E&M-EST. PATIENT-LVL III: CPT | Mod: PBBFAC,,, | Performed by: NURSE PRACTITIONER

## 2019-07-02 NOTE — PROGRESS NOTES
"This 58 y.o. female patient returns for the management of obstructive sleep apnea.    Since last seen she continues to use apap 8-12cm. Faithful use. also uses cpap. No chin strap but denies oral drying. Using Wisp mask. ESS=1. Getting regular supplies. Denies interval medical change. 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 Continued resolution of snoring, apneic pauses, daytime sleepiness.   She is an advocate for Vinod  Encore review:  Date Range: 6/1/2019 - 6/30/2019     Hide 6% leak, 90% tile 9.8     Compliance Summary  Apnea Indices    Days with Device Usage:  30 days  Average AHI:  3.4    Percentage of Days >=4 Hours:  100.0%     Average Usage (Days Used):  8 hrs. 13 mins. 44 secs.     Average Usage (All Days):  8 hrs. 13 mins. 44 secs.         Patient had a repeat sleep study after significant weight loss following bariatric procedure.  PSG revealed VINOD 2011, but limited to supine sleep. Total AHI 14.3, supine AHI 45.1, side AHI 3.7; wt 138 lbs.      REVIEW OF SYSTEMS:  Sleep related symptoms as per HPI; 5# gain. Otherwise a balance review of 10-systems is negative.     PHYSICAL EXAM:  /71   Pulse 66   Ht 5' 4" (1.626 m)   Wt 66 kg (145 lb 8.1 oz)   BMI 24.98 kg/m²   GENERAL: Well groomed; Normally developed;    ASSESSMENT:    1. Obstructive Sleep Apnea, mild by AHI criteria. Symptoms are controlled with nightly CPAP. Effective pressure by 90%tile on new CPAP. She is adherent objectively and symptomatically improved. Continues to benefit from CPAP.             PLAN:    Treatment:  1. Continue auto CPAP at 8-12 cm; CPAP supplies via [facility]  2.  Discussed effectiveness of therapy and potential ramifications of untreated VINOD, including heart disease, hypertension, cognitive difficulties, stroke, and diabetes.    3. RTC annually, sooner if needed    "

## 2019-07-15 ENCOUNTER — TELEPHONE (OUTPATIENT)
Dept: INTERNAL MEDICINE | Facility: CLINIC | Age: 59
End: 2019-07-15

## 2019-07-15 DIAGNOSIS — Z98.84 HISTORY OF GASTRIC BYPASS: Primary | ICD-10-CM

## 2019-07-15 NOTE — TELEPHONE ENCOUNTER
----- Message from Meenu Castillo sent at 7/15/2019  1:46 PM CDT -----  Contact: Pt [phone]  Type: Orders Request    What orders/ testing are being requested? EPP    Is there a future appointment scheduled for the patient with PCP? Yes    When? 11/4/19

## 2019-09-24 ENCOUNTER — LAB VISIT (OUTPATIENT)
Dept: LAB | Facility: HOSPITAL | Age: 59
End: 2019-09-24
Attending: INTERNAL MEDICINE
Payer: COMMERCIAL

## 2019-09-24 DIAGNOSIS — Z98.84 HISTORY OF GASTRIC BYPASS: ICD-10-CM

## 2019-09-24 LAB
25(OH)D3+25(OH)D2 SERPL-MCNC: 53 NG/ML (ref 30–96)
ALBUMIN SERPL BCP-MCNC: 4.1 G/DL (ref 3.5–5.2)
ALP SERPL-CCNC: 92 U/L (ref 55–135)
ALT SERPL W/O P-5'-P-CCNC: 23 U/L (ref 10–44)
ANION GAP SERPL CALC-SCNC: 8 MMOL/L (ref 8–16)
AST SERPL-CCNC: 22 U/L (ref 10–40)
BASOPHILS # BLD AUTO: 0.05 K/UL (ref 0–0.2)
BASOPHILS NFR BLD: 1.4 % (ref 0–1.9)
BILIRUB SERPL-MCNC: 0.5 MG/DL (ref 0.1–1)
BUN SERPL-MCNC: 16 MG/DL (ref 6–20)
CALCIUM SERPL-MCNC: 9.2 MG/DL (ref 8.7–10.5)
CHLORIDE SERPL-SCNC: 106 MMOL/L (ref 95–110)
CHOLEST SERPL-MCNC: 225 MG/DL (ref 120–199)
CHOLEST/HDLC SERPL: 3.3 {RATIO} (ref 2–5)
CO2 SERPL-SCNC: 30 MMOL/L (ref 23–29)
CREAT SERPL-MCNC: 0.7 MG/DL (ref 0.5–1.4)
DIFFERENTIAL METHOD: ABNORMAL
EOSINOPHIL # BLD AUTO: 0.3 K/UL (ref 0–0.5)
EOSINOPHIL NFR BLD: 7.2 % (ref 0–8)
ERYTHROCYTE [DISTWIDTH] IN BLOOD BY AUTOMATED COUNT: 12.7 % (ref 11.5–14.5)
EST. GFR  (AFRICAN AMERICAN): >60 ML/MIN/1.73 M^2
EST. GFR  (NON AFRICAN AMERICAN): >60 ML/MIN/1.73 M^2
FERRITIN SERPL-MCNC: 68 NG/ML (ref 20–300)
GLUCOSE SERPL-MCNC: 89 MG/DL (ref 70–110)
HCT VFR BLD AUTO: 44.4 % (ref 37–48.5)
HDLC SERPL-MCNC: 68 MG/DL (ref 40–75)
HDLC SERPL: 30.2 % (ref 20–50)
HGB BLD-MCNC: 14 G/DL (ref 12–16)
IMM GRANULOCYTES # BLD AUTO: 0.01 K/UL (ref 0–0.04)
IMM GRANULOCYTES NFR BLD AUTO: 0.3 % (ref 0–0.5)
IRON SERPL-MCNC: 99 UG/DL (ref 30–160)
LDLC SERPL CALC-MCNC: 141.8 MG/DL (ref 63–159)
LYMPHOCYTES # BLD AUTO: 1.5 K/UL (ref 1–4.8)
LYMPHOCYTES NFR BLD: 41.2 % (ref 18–48)
MAGNESIUM SERPL-MCNC: 2.3 MG/DL (ref 1.6–2.6)
MCH RBC QN AUTO: 29.7 PG (ref 27–31)
MCHC RBC AUTO-ENTMCNC: 31.5 G/DL (ref 32–36)
MCV RBC AUTO: 94 FL (ref 82–98)
MONOCYTES # BLD AUTO: 0.3 K/UL (ref 0.3–1)
MONOCYTES NFR BLD: 8.3 % (ref 4–15)
NEUTROPHILS # BLD AUTO: 1.5 K/UL (ref 1.8–7.7)
NEUTROPHILS NFR BLD: 41.6 % (ref 38–73)
NONHDLC SERPL-MCNC: 157 MG/DL
NRBC BLD-RTO: 0 /100 WBC
PLATELET # BLD AUTO: 274 K/UL (ref 150–350)
PMV BLD AUTO: 9.8 FL (ref 9.2–12.9)
POTASSIUM SERPL-SCNC: 3.7 MMOL/L (ref 3.5–5.1)
PROT SERPL-MCNC: 6.7 G/DL (ref 6–8.4)
RBC # BLD AUTO: 4.71 M/UL (ref 4–5.4)
SATURATED IRON: 28 % (ref 20–50)
SODIUM SERPL-SCNC: 144 MMOL/L (ref 136–145)
TOTAL IRON BINDING CAPACITY: 357 UG/DL (ref 250–450)
TRANSFERRIN SERPL-MCNC: 241 MG/DL (ref 200–375)
TRIGL SERPL-MCNC: 76 MG/DL (ref 30–150)
TSH SERPL DL<=0.005 MIU/L-ACNC: 2.36 UIU/ML (ref 0.4–4)
VIT B12 SERPL-MCNC: 1631 PG/ML (ref 210–950)
WBC # BLD AUTO: 3.62 K/UL (ref 3.9–12.7)

## 2019-09-24 PROCEDURE — 80061 LIPID PANEL: CPT

## 2019-09-24 PROCEDURE — 82607 VITAMIN B-12: CPT

## 2019-09-24 PROCEDURE — 83735 ASSAY OF MAGNESIUM: CPT

## 2019-09-24 PROCEDURE — 83540 ASSAY OF IRON: CPT

## 2019-09-24 PROCEDURE — 82728 ASSAY OF FERRITIN: CPT

## 2019-09-24 PROCEDURE — 84443 ASSAY THYROID STIM HORMONE: CPT

## 2019-09-24 PROCEDURE — 85025 COMPLETE CBC W/AUTO DIFF WBC: CPT

## 2019-09-24 PROCEDURE — 84425 ASSAY OF VITAMIN B-1: CPT

## 2019-09-24 PROCEDURE — 82306 VITAMIN D 25 HYDROXY: CPT

## 2019-09-24 PROCEDURE — 80053 COMPREHEN METABOLIC PANEL: CPT

## 2019-09-27 LAB — VIT B1 BLD-MCNC: 74 UG/L (ref 38–122)

## 2019-10-25 ENCOUNTER — TELEPHONE (OUTPATIENT)
Dept: INTERNAL MEDICINE | Facility: CLINIC | Age: 59
End: 2019-10-25

## 2019-10-25 DIAGNOSIS — Z12.31 ENCOUNTER FOR SCREENING MAMMOGRAM FOR BREAST CANCER: Primary | ICD-10-CM

## 2019-10-25 NOTE — TELEPHONE ENCOUNTER
----- Message from Cookie Colin sent at 10/25/2019  4:07 PM CDT -----  Contact: self   Caller is requesting to schedule their annual screening mammogram appointment. Order is not listed in Epic.  Please enter order and contact patient to schedule.  Where would they like the mammogram performed?:  [facility]  Would the patient rather receive a phone call back or a response via [facility]?:   Call back  Additional information:

## 2019-11-04 ENCOUNTER — OFFICE VISIT (OUTPATIENT)
Dept: INTERNAL MEDICINE | Facility: CLINIC | Age: 59
End: 2019-11-04
Payer: COMMERCIAL

## 2019-11-04 VITALS
WEIGHT: 143 LBS | HEIGHT: 64 IN | BODY MASS INDEX: 24.41 KG/M2 | SYSTOLIC BLOOD PRESSURE: 106 MMHG | DIASTOLIC BLOOD PRESSURE: 72 MMHG | HEART RATE: 71 BPM

## 2019-11-04 DIAGNOSIS — K76.0 NAFL (NONALCOHOLIC FATTY LIVER): ICD-10-CM

## 2019-11-04 DIAGNOSIS — E78.5 HYPERLIPIDEMIA, UNSPECIFIED HYPERLIPIDEMIA TYPE: ICD-10-CM

## 2019-11-04 DIAGNOSIS — G47.30 SLEEP APNEA, UNSPECIFIED TYPE: ICD-10-CM

## 2019-11-04 DIAGNOSIS — Z12.11 COLON CANCER SCREENING: ICD-10-CM

## 2019-11-04 DIAGNOSIS — Z98.84 HISTORY OF GASTRIC BYPASS: ICD-10-CM

## 2019-11-04 DIAGNOSIS — Z00.00 WELLNESS EXAMINATION: ICD-10-CM

## 2019-11-04 DIAGNOSIS — Z00.00 PHYSICAL EXAM: Primary | ICD-10-CM

## 2019-11-04 PROCEDURE — 99396 PREV VISIT EST AGE 40-64: CPT | Mod: S$GLB,,, | Performed by: INTERNAL MEDICINE

## 2019-11-04 PROCEDURE — 99396 PR PREVENTIVE VISIT,EST,40-64: ICD-10-PCS | Mod: S$GLB,,, | Performed by: INTERNAL MEDICINE

## 2019-11-04 PROCEDURE — 99999 PR PBB SHADOW E&M-EST. PATIENT-LVL III: CPT | Mod: PBBFAC,,, | Performed by: INTERNAL MEDICINE

## 2019-11-04 PROCEDURE — 99999 PR PBB SHADOW E&M-EST. PATIENT-LVL III: ICD-10-PCS | Mod: PBBFAC,,, | Performed by: INTERNAL MEDICINE

## 2019-11-04 NOTE — PROGRESS NOTES
Subjective:      Patient ID: Ayah Ceron is a 58 y.o. female.    Chief Complaint: Annual Exam    HPI:  HPI   Patient is here for her annual exam:    Annual exam:11/4/2019  Colonoscopy: 2012 follow up in 7 years no family of personal history of colon polyps: Fit card given  Colonoscopy ordered  Mammogram: scheduled   Gyn: hysterectomy and oophorectomy: 2018  Every 3-5 yeares  Optho:Dr. Suarez due in 1/2020  Flu: done   Tetanus:10/14/2014  Shingrix: discussed  Pneumovax: due 67  Prevnar: due 66  Bone Density:: not due        Consultants  Bariatric Surgery follow up:Brittaney: every 2 years  Hepatology : Dr. Leiva no follow up needed  Dr. Suarez yearly  Dr. Blanchard:rectocele surgery  Sleep Disorders: Sleep apnea:patient uses sleep apnea equipment             Patient Active Problem List   Diagnosis    Functional dyspareunia    Hyperlipemia    Steatosis    Deviated septum    Sinus problem    History of gastric bypass    Vaginal relaxation    Incomplete defecation    Rectocele    Sleep apnea    Atrophic vaginitis    NAFL (nonalcoholic fatty liver)     Past Medical History:   Diagnosis Date    Anxiety     situational    Deviated septum     Fibroid     Hordeolum externum of right lower eyelid     Hyperlipemia     Liver disease     fatty liver    PONV (postoperative nausea and vomiting)     Sleep apnea     cpap pressure 8    Steatosis      Past Surgical History:   Procedure Laterality Date    CARPAL TUNNEL RELEASE      DILATION AND CURETTAGE OF UTERUS      GASTRIC BYPASS  04/25/2011    HEMORRHOID SURGERY      HYSTERECTOMY      TVH BSO    LASIK      10/26/03    TONSILLECTOMY, ADENOIDECTOMY      TUBAL LIGATION      WISDOM TOOTH EXTRACTION       Family History   Problem Relation Age of Onset    Hypothyroidism Mother         had thyroidectomy    Hypertension Father     Arthritis Father     Hyperlipidemia Father     Eczema Daughter     Acne Daughter     Acne Son     Breast cancer Paternal Aunt   "   Breast cancer Paternal Grandmother     Psoriasis Neg Hx     Lupus Neg Hx     Ovarian cancer Neg Hx     Cervical cancer Neg Hx     Endometrial cancer Neg Hx     Vaginal cancer Neg Hx      Review of Systems   Constitutional: Negative for chills, fever and unexpected weight change.   HENT: Negative for trouble swallowing.    Respiratory: Negative for cough, shortness of breath and wheezing.    Cardiovascular: Negative for chest pain and palpitations.   Gastrointestinal: Negative for abdominal distention, abdominal pain, blood in stool and vomiting.   Musculoskeletal: Negative for back pain.     Objective:     Vitals:    11/04/19 1518   BP: 106/72   Pulse: 71   Weight: 64.9 kg (143 lb)   Height: 5' 4" (1.626 m)   PainSc: 0-No pain     Body mass index is 24.55 kg/m².  Physical Exam   Constitutional: She is oriented to person, place, and time. She appears well-developed and well-nourished. No distress.   Neck: Carotid bruit is not present. No thyromegaly present.   Cardiovascular: Normal rate, regular rhythm and normal heart sounds. PMI is not displaced.   Pulmonary/Chest: Effort normal and breath sounds normal. No respiratory distress.   Abdominal: Soft. Bowel sounds are normal. She exhibits no distension. There is no tenderness.   Musculoskeletal: She exhibits no edema.   Neurological: She is alert and oriented to person, place, and time.     Assessment:     1. Physical exam    2. Colon cancer screening    3. Wellness examination    4. History of gastric bypass    5. Hyperlipidemia, unspecified hyperlipidemia type    6. NAFL (nonalcoholic fatty liver)    7. Sleep apnea, unspecified type      Plan:   Ayah was seen today for annual exam.    Diagnoses and all orders for this visit:    Physical exam  Comments:  No new findings on exam    Colon cancer screening  Comments:  Discussed  Orders:  -     Case request GI: COLONOSCOPY    Wellness examination  Comments:  updated and reviewed    History of gastric " bypass  Comments:  Follows in [facility]ariat[facility]    Hyperlipidemia, unspecified hyperlipidemia type    NAFL (nonalcoholic fatty liver)  Comments:  Follows every 2 years    Sleep apnea, unspecified type  Comments:  Uses it every night at least 4 hours        Problem List Items Addressed This Visit     Hyperlipemia    History of gastric bypass    Sleep apnea    NAFL (nonalcoholic fatty liver)      Other Visit Diagnoses     Physical exam    -  Primary    No new findings on exam    Colon cancer screening        Discussed    Relevant Orders    Case request GI: COLONOSCOPY (Completed)    Wellness examination        updated and reviewed        Orders Placed This Encounter   Procedures    Case request GI: COLONOSCOPY     Order Specific Question:   Pre-op Diagnosis     Answer:   Screening [453162]     Order Specific Question:   CPT Code:     Answer:   [place] COLORECTAL CANCER SCREEN RESULTS DOCUMENT/REVIEW [3017F]     Order Specific Question:   Case Referring Provider     Answer:   CAROLINA LEUNG [[id number]]     Order Specific Question:   CPT Code:     Answer:   [place] COLON CA SCRN NOT HI RSK IND []     Order Specific Question:   Medical Necessity:     Answer:   Medically Non-Urgent [100]     Follow up in about 1 year (around 11/4/2020) for Annual exam.     Medication List           Accurate as of November 4, 2019  8:34 PM. If you have any questions, ask your nurse or doctor.               CONTINUE taking these medications    calcium citrate-vitamin D3 500 mg calcium -400 unit Chew     desloratadine 5 mg tablet  Commonly known as:  CLARINEX  Take 1 tablet (5 mg total) by mouth once daily.     docusate sodium 100 MG capsule  Commonly known as:  COLACE     MAG-OXIDE ORAL     multivitamin capsule     PREMARIN vaginal cream  Generic drug:  conjugated estrogens  Place 0.5 g vaginally twice a week.     SUPER B COMPLEX ORAL     VITAMIN B-12 500 MCG tablet  Generic drug:  cyanocobalamin

## 2019-11-04 NOTE — PATIENT INSTRUCTIONS
Colonoscopy scheduling [phone]    New shingles vaccine: SHINGRIX ( 2018) not live, 90%,  2 shots, one at day zero and the 2nd at 2-6 months: any pharmacy can give it.

## 2019-11-12 ENCOUNTER — HOSPITAL ENCOUNTER (OUTPATIENT)
Dept: RADIOLOGY | Facility: HOSPITAL | Age: 59
Discharge: HOME | End: 2019-11-12
Attending: INTERNAL MEDICINE
Payer: COMMERCIAL

## 2019-11-12 DIAGNOSIS — Z12.31 ENCOUNTER FOR SCREENING MAMMOGRAM FOR BREAST CANCER: ICD-10-CM

## 2019-11-12 PROCEDURE — 77063 MAMMO DIGITAL SCREENING BILAT WITH TOMOSYNTHESIS_CAD: ICD-10-PCS | Mod: 26,,, | Performed by: RADIOLOGY

## 2019-11-12 PROCEDURE — 77067 SCR MAMMO BI INCL CAD: CPT | Mod: TC

## 2019-11-12 PROCEDURE — 77067 MAMMO DIGITAL SCREENING BILAT WITH TOMOSYNTHESIS_CAD: ICD-10-PCS | Mod: 26,,, | Performed by: RADIOLOGY

## 2019-11-12 PROCEDURE — 77063 BREAST TOMOSYNTHESIS BI: CPT | Mod: 26,,, | Performed by: RADIOLOGY

## 2019-11-12 PROCEDURE — 77067 SCR MAMMO BI INCL CAD: CPT | Mod: 26,,, | Performed by: RADIOLOGY

## 2019-12-03 ENCOUNTER — PATIENT OUTREACH (OUTPATIENT)
Dept: ADMINISTRATIVE | Facility: OTHER | Age: 59
End: 2019-12-03

## 2019-12-03 DIAGNOSIS — Z12.11 ENCOUNTER FOR FIT (FECAL IMMUNOCHEMICAL TEST) SCREENING: Primary | ICD-10-CM

## 2019-12-04 ENCOUNTER — PATIENT MESSAGE (OUTPATIENT)
Dept: ADMINISTRATIVE | Facility: OTHER | Age: 59
End: 2019-12-04

## 2019-12-04 ENCOUNTER — OFFICE VISIT (OUTPATIENT)
Dept: BARIATRICS | Facility: CLINIC | Age: 59
End: 2019-12-04
Payer: COMMERCIAL

## 2019-12-04 VITALS
SYSTOLIC BLOOD PRESSURE: 122 MMHG | BODY MASS INDEX: 24.32 KG/M2 | HEART RATE: 64 BPM | WEIGHT: 142.44 LBS | HEIGHT: 64 IN | DIASTOLIC BLOOD PRESSURE: 64 MMHG

## 2019-12-04 DIAGNOSIS — Z98.84 HISTORY OF ROUX-EN-Y GASTRIC BYPASS: Primary | ICD-10-CM

## 2019-12-04 PROCEDURE — 99999 PR PBB SHADOW E&M-EST. PATIENT-LVL III: CPT | Mod: PBBFAC,,, | Performed by: PHYSICIAN ASSISTANT

## 2019-12-04 PROCEDURE — 99213 PR OFFICE/OUTPT VISIT, EST, LEVL III, 20-29 MIN: ICD-10-PCS | Mod: S$GLB,,, | Performed by: PHYSICIAN ASSISTANT

## 2019-12-04 PROCEDURE — 99213 OFFICE O/P EST LOW 20 MIN: CPT | Mod: S$GLB,,, | Performed by: PHYSICIAN ASSISTANT

## 2019-12-04 PROCEDURE — 99999 PR PBB SHADOW E&M-EST. PATIENT-LVL III: ICD-10-PCS | Mod: PBBFAC,,, | Performed by: PHYSICIAN ASSISTANT

## 2019-12-04 PROCEDURE — 3008F PR BODY MASS INDEX (BMI) DOCUMENTED: ICD-10-PCS | Mod: CPTII,S$GLB,, | Performed by: PHYSICIAN ASSISTANT

## 2019-12-04 PROCEDURE — 3008F BODY MASS INDEX DOCD: CPT | Mod: CPTII,S$GLB,, | Performed by: PHYSICIAN ASSISTANT

## 2019-12-04 NOTE — PROGRESS NOTES
Subjective:       Patient ID: Ayah Ceron is a 58 y.o. female.    Chief Complaint: Follow-up    HPI: Patient presents for 6 year follow up of [facility].  She is feeling well and tolerating the diet without difficulty.  She is without complaints.  She is maintaining 70 pound loss and a normal BMI.  She is doing very well.  She has a grand-daughter that is 2: Maurice.                                                                         EXERCISE and VITAMINS: Reviewed in bariatric assessment.                                                                                DIET:  Regular bariatric diet.  Obtains >80 gm prot daily through 4-5 small meals and 2 protein shakes daily (mostly while at work and not the weekends).   48+ fl oz                                                                               MEDICATIONS AND ALLERGIES:  Reviewed in Navigator.    Review of Systems   Constitutional: Negative for activity change, appetite change, fatigue and fever.   HENT: Negative for trouble swallowing and voice change.    Eyes: Negative for photophobia, pain, discharge and visual disturbance.   Respiratory: Negative for cough, choking and chest tightness.    Cardiovascular: Negative for chest pain and palpitations.   Gastrointestinal: Negative for abdominal distention, abdominal pain, blood in stool, constipation, diarrhea, nausea and vomiting.   Genitourinary: Negative for difficulty urinating, dysuria, frequency, hematuria and urgency.   Musculoskeletal: Negative for arthralgias, back pain, gait problem and joint swelling.       Objective:      Physical Exam   Constitutional: She is oriented to person, place, and time. She appears well-developed and well-nourished.   HENT:   Head: Normocephalic and atraumatic.   Eyes: Conjunctivae are normal. No scleral icterus.   Musculoskeletal: She exhibits no edema.   Neurological: She is alert and oriented to person, place, and time.   Skin: Skin is warm and dry.   Psychiatric:  She has a normal mood and affect. Her behavior is normal.   Nursing note and vitals reviewed.      Assessment:       -  Excellent weight loss (94% EWL) s/p DESTINI on 4/25/2011.  -  Co Morbidities: VINOD (stable with CPAP), KAHN, and HDL.  All stable without medication.   -  Excellent diet.  -  Good exercise.  -  Good vitamin regimen.    Plan:       - Patient to follow bariatric diet plan and continue obtaining 80 gm protein daily.   - Patient to continue exercising on a regular basis.  - Continue avoidance of NSAIDs to prevent gastric ulcer disease.  - Patient to take bariatric vitamin regimen: MVI, calcium citrate, and Vitamin B12 as directed.  - RTC yearly or every other year or sooner if needed.  - Call the office for any issues.  - Reviewed yearly labs from PCP labs Sept 2019.  - Continue yearly monitoring of vitamin values with PCP.      15 minute visit, over 15 minutes spent counseling patient on diet, vitamins, exercise, and weight loss.

## 2019-12-19 RX ORDER — DESLORATADINE 5 MG/1
5 TABLET ORAL DAILY
Qty: 90 TABLET | Refills: 3 | Status: SHIPPED | OUTPATIENT
Start: 2019-12-19 | End: 2020-11-02 | Stop reason: SDUPTHER

## 2020-01-26 ENCOUNTER — PATIENT OUTREACH (OUTPATIENT)
Dept: ADMINISTRATIVE | Facility: OTHER | Age: 60
End: 2020-01-26

## 2020-01-27 ENCOUNTER — OFFICE VISIT (OUTPATIENT)
Dept: OPTOMETRY | Facility: CLINIC | Age: 60
End: 2020-01-27
Payer: COMMERCIAL

## 2020-01-27 DIAGNOSIS — H52.4 PRESBYOPIA: ICD-10-CM

## 2020-01-27 DIAGNOSIS — Z01.00 EXAMINATION OF EYES AND VISION: Primary | ICD-10-CM

## 2020-01-27 PROCEDURE — 99999 PR PBB SHADOW E&M-EST. PATIENT-LVL II: ICD-10-PCS | Mod: PBBFAC,,, | Performed by: OPTOMETRIST

## 2020-01-27 PROCEDURE — 92014 COMPRE OPH EXAM EST PT 1/>: CPT | Mod: S$GLB,,, | Performed by: OPTOMETRIST

## 2020-01-27 PROCEDURE — 99999 PR PBB SHADOW E&M-EST. PATIENT-LVL II: CPT | Mod: PBBFAC,,, | Performed by: OPTOMETRIST

## 2020-01-27 PROCEDURE — 92014 PR EYE EXAM, EST PATIENT,COMPREHESV: ICD-10-PCS | Mod: S$GLB,,, | Performed by: OPTOMETRIST

## 2020-01-27 NOTE — PROGRESS NOTES
Care Everywhere: n/a  Immunization: updated  Health Maintenance: updated   Media Review: n/a  Legacy Review: n/a  Order placed: n/a  Upcoming appts:n/a

## 2020-01-28 NOTE — PROGRESS NOTES
HPI     Last eye exam was 1/24/19 with   Pt here for routine eye exam.    Pt states she is doing well.  Pt states that she does not wear glasses for distance, and does not want   any.  Pt wears readers occasionally +1.50  Pt states that she had a stye inner lower lid OD a while back, and she   states she feels like she can still see it. Pt denies pain or soreness,   but just wants it to be checked out to see if  sees anything.  Patient denies diplopia, headaches, flashes/floaters, and pain.  No eye drops, and lasik sx OU        Last edited by Haydee Sauceda on 1/27/2020  3:40 PM. (History)            Assessment /Plan     For exam results, see Encounter Report.    Examination of eyes and vision    Presbyopia            1-2.  No rx given.  Continue with OTC readers.  Eye health normal OU.   RTC 1 year for routine exam.

## 2020-04-21 DIAGNOSIS — Z01.84 ANTIBODY RESPONSE EXAMINATION: ICD-10-CM

## 2020-04-23 ENCOUNTER — LAB VISIT (OUTPATIENT)
Dept: LAB | Facility: HOSPITAL | Age: 60
End: 2020-04-23
Attending: INTERNAL MEDICINE
Payer: COMMERCIAL

## 2020-04-23 DIAGNOSIS — Z01.84 ANTIBODY RESPONSE EXAMINATION: ICD-10-CM

## 2020-04-23 LAB — SARS-COV-2 IGG SERPL QL IA: POSITIVE

## 2020-04-23 PROCEDURE — 36415 COLL VENOUS BLD VENIPUNCTURE: CPT

## 2020-04-23 PROCEDURE — 86769 SARS-COV-2 COVID-19 ANTIBODY: CPT

## 2020-07-06 ENCOUNTER — PATIENT OUTREACH (OUTPATIENT)
Dept: ADMINISTRATIVE | Facility: OTHER | Age: 60
End: 2020-07-06

## 2020-07-06 NOTE — PROGRESS NOTES
Patient's chart was reviewed for overdue STEPHANIE topics.  Immunizations reconciled.    Orders placed:n/a  Tasked appts:n/a  Labs Linked:n/a

## 2020-07-07 ENCOUNTER — OFFICE VISIT (OUTPATIENT)
Dept: DERMATOLOGY | Facility: CLINIC | Age: 60
End: 2020-07-07
Payer: COMMERCIAL

## 2020-07-07 DIAGNOSIS — B88.0 INFESTATION BY DEMODEX: ICD-10-CM

## 2020-07-07 DIAGNOSIS — L71.9 ROSACEA: Primary | ICD-10-CM

## 2020-07-07 PROCEDURE — 99214 PR OFFICE/OUTPT VISIT, EST, LEVL IV, 30-39 MIN: ICD-10-PCS | Mod: S$GLB,,, | Performed by: PHYSICIAN ASSISTANT

## 2020-07-07 PROCEDURE — 99999 PR PBB SHADOW E&M-EST. PATIENT-LVL III: ICD-10-PCS | Mod: PBBFAC,,, | Performed by: PHYSICIAN ASSISTANT

## 2020-07-07 PROCEDURE — 99214 OFFICE O/P EST MOD 30 MIN: CPT | Mod: S$GLB,,, | Performed by: PHYSICIAN ASSISTANT

## 2020-07-07 PROCEDURE — 99999 PR PBB SHADOW E&M-EST. PATIENT-LVL III: CPT | Mod: PBBFAC,,, | Performed by: PHYSICIAN ASSISTANT

## 2020-07-07 RX ORDER — DOXYCYCLINE 100 MG/1
TABLET ORAL
Qty: 30 TABLET | Refills: 2 | Status: SHIPPED | OUTPATIENT
Start: 2020-07-07

## 2020-07-07 RX ORDER — IVERMECTIN 10 MG/G
CREAM TOPICAL
Qty: 45 G | Refills: 3 | Status: SHIPPED | OUTPATIENT
Start: 2020-07-07 | End: 2020-07-09 | Stop reason: SDUPTHER

## 2020-07-07 RX ORDER — IVERMECTIN 3 MG/1
TABLET ORAL
Qty: 12 TABLET | Refills: 0 | Status: SHIPPED | OUTPATIENT
Start: 2020-07-07

## 2020-07-07 RX ORDER — SODIUM SULFACETAMIDE AND SULFUR 80; 40 MG/ML; MG/ML
SOLUTION TOPICAL
Qty: 1 BOTTLE | Refills: 5 | Status: SHIPPED | OUTPATIENT
Start: 2020-07-07

## 2020-07-07 NOTE — PROGRESS NOTES
Subjective:       Patient ID:  Ayah Ceron is a 59 y.o. female who presents for   Chief Complaint   Patient presents with    Rosacea     face, X4wks, red and itchy, otc tx      History of Present Illness: The patient presents with chief complaint of rash.  Location: face (worst on forehead)  Duration: 1 month  Signs/Symptoms: redness, bumps, itching on forehead  Prior treatments: HC crm, various facial cleansers, aloe vera ltn    Denies new personal care products prior to onset of rash.      Review of Systems   Constitutional: Negative for fever.   Eyes: Negative for itching, eye irritation and visual change.   Skin: Positive for itching, rash and activity-related sunscreen use. Negative for daily sunscreen use and recent sunburn.   Hematologic/Lymphatic: Does not bruise/bleed easily.        Objective:    Physical Exam   Constitutional: She appears well-developed and well-nourished. No distress.   Neurological: She is alert and oriented to person, place, and time. She is not disoriented.   Psychiatric: She has a normal mood and affect.   Skin:   Areas Examined (abnormalities noted in diagram):   Head / Face Inspection Performed  Neck Inspection Performed              Diagram Legend     Erythematous scaling macule/papule c/w actinic keratosis       Vascular papule c/w angioma      Pigmented verrucoid papule/plaque c/w seborrheic keratosis      Yellow umbilicated papule c/w sebaceous hyperplasia      Irregularly shaped tan macule c/w lentigo     1-2 mm smooth white papules consistent with Milia      Movable subcutaneous cyst with punctum c/w epidermal inclusion cyst      Subcutaneous movable cyst c/w pilar cyst      Firm pink to brown papule c/w dermatofibroma      Pedunculated fleshy papule(s) c/w skin tag(s)      Evenly pigmented macule c/w junctional nevus     Mildly variegated pigmented, slightly irregular-bordered macule c/w mildly atypical nevus      Flesh colored to evenly pigmented papule c/w intradermal  nevus       Pink pearly papule/plaque c/w basal cell carcinoma      Erythematous hyperkeratotic cursted plaque c/w SCC      Surgical scar with no sign of skin cancer recurrence      Open and closed comedones      Inflammatory papules and pustules      Verrucoid papule consistent consistent with wart     Erythematous eczematous patches and plaques     Dystrophic onycholytic nail with subungual debris c/w onychomycosis     Umbilicated papule    Erythematous-base heme-crusted tan verrucoid plaque consistent with inflamed seborrheic keratosis     Erythematous Silvery Scaling Plaque c/w Psoriasis     See annotation    Assessment / Plan:      Rosacea with Infestation by Demodex - NP  -     ivermectin (STROMECTOL) 3 mg Tab; Take 6 pills together then repeat in 2 weeks  Dispense: 12 tablet; Refill: 0  -     ivermectin (SOOLANTRA) 1 % Crea; AAA face qhs and wash off in morning  Dispense: 45 g; Refill: 3  -     doxycycline monohydrate 100 mg Tab; Take 1 tablet (100 mg total) by mouth daily  Dispense: 30 tablet; Refill: 2  -     sulfacetamide sodium-sulfur (SULFACLEANSE 8-4) 8-4 % Susp; Wash face qd - bid  Dispense: 1 Bottle; Refill: 5    Discussed benefits and risks of doxycyline therapy including but not limited to GI discomfort, esophageal irritation/ulceration, and increased sun sensitivity. Patient was counseled to take medicine with meals and at least 1 hour before lying down.          Follow up send message via pt portal in 6 wks.

## 2020-07-09 ENCOUNTER — TELEPHONE (OUTPATIENT)
Dept: PHARMACY | Facility: CLINIC | Age: 60
End: 2020-07-09

## 2020-07-09 ENCOUNTER — TELEPHONE (OUTPATIENT)
Dept: DERMATOLOGY | Facility: CLINIC | Age: 60
End: 2020-07-09

## 2020-07-09 DIAGNOSIS — L71.9 ROSACEA: ICD-10-CM

## 2020-07-09 RX ORDER — IVERMECTIN 10 MG/G
CREAM TOPICAL
Qty: 45 G | Refills: 3 | Status: SHIPPED | OUTPATIENT
Start: 2020-07-09

## 2020-07-09 NOTE — TELEPHONE ENCOUNTER
Called pt to inform her that Neda sent her Rx to [facility] pharmacy. Left  stating pharmacy contact information.    RD

## 2020-07-23 ENCOUNTER — PATIENT OUTREACH (OUTPATIENT)
Dept: ADMINISTRATIVE | Facility: HOSPITAL | Age: 60
End: 2020-07-23

## 2020-10-12 ENCOUNTER — TELEPHONE (OUTPATIENT)
Dept: INTERNAL MEDICINE | Facility: CLINIC | Age: 60
End: 2020-10-12

## 2020-10-12 DIAGNOSIS — Z00.00 WELLNESS EXAMINATION: Primary | ICD-10-CM

## 2020-10-12 DIAGNOSIS — Z12.31 ENCOUNTER FOR SCREENING MAMMOGRAM FOR BREAST CANCER: ICD-10-CM

## 2020-10-20 ENCOUNTER — PATIENT OUTREACH (OUTPATIENT)
Dept: ADMINISTRATIVE | Facility: HOSPITAL | Age: 60
End: 2020-10-20

## 2020-10-20 ENCOUNTER — PATIENT MESSAGE (OUTPATIENT)
Dept: ADMINISTRATIVE | Facility: HOSPITAL | Age: 60
End: 2020-10-20

## 2020-10-28 ENCOUNTER — LAB VISIT (OUTPATIENT)
Dept: LAB | Facility: HOSPITAL | Age: 60
End: 2020-10-28
Attending: INTERNAL MEDICINE
Payer: COMMERCIAL

## 2020-10-28 DIAGNOSIS — Z00.00 WELLNESS EXAMINATION: ICD-10-CM

## 2020-10-28 LAB
25(OH)D3+25(OH)D2 SERPL-MCNC: 45 NG/ML (ref 30–96)
ALBUMIN SERPL BCP-MCNC: 3.8 G/DL (ref 3.5–5.2)
ALP SERPL-CCNC: 88 U/L (ref 55–135)
ALT SERPL W/O P-5'-P-CCNC: 54 U/L (ref 10–44)
ANION GAP SERPL CALC-SCNC: 8 MMOL/L (ref 8–16)
AST SERPL-CCNC: 38 U/L (ref 10–40)
BASOPHILS # BLD AUTO: 0.07 K/UL (ref 0–0.2)
BASOPHILS NFR BLD: 1.9 % (ref 0–1.9)
BILIRUB SERPL-MCNC: 0.4 MG/DL (ref 0.1–1)
BUN SERPL-MCNC: 14 MG/DL (ref 6–20)
CALCIUM SERPL-MCNC: 9 MG/DL (ref 8.7–10.5)
CHLORIDE SERPL-SCNC: 106 MMOL/L (ref 95–110)
CHOLEST SERPL-MCNC: 216 MG/DL (ref 120–199)
CHOLEST/HDLC SERPL: 3.3 {RATIO} (ref 2–5)
CO2 SERPL-SCNC: 28 MMOL/L (ref 23–29)
CREAT SERPL-MCNC: 0.7 MG/DL (ref 0.5–1.4)
DIFFERENTIAL METHOD: ABNORMAL
EOSINOPHIL # BLD AUTO: 0.3 K/UL (ref 0–0.5)
EOSINOPHIL NFR BLD: 6.8 % (ref 0–8)
ERYTHROCYTE [DISTWIDTH] IN BLOOD BY AUTOMATED COUNT: 12.9 % (ref 11.5–14.5)
EST. GFR  (AFRICAN AMERICAN): >60 ML/MIN/1.73 M^2
EST. GFR  (NON AFRICAN AMERICAN): >60 ML/MIN/1.73 M^2
FERRITIN SERPL-MCNC: 62 NG/ML (ref 20–300)
GLUCOSE SERPL-MCNC: 99 MG/DL (ref 70–110)
HCT VFR BLD AUTO: 43.1 % (ref 37–48.5)
HDLC SERPL-MCNC: 66 MG/DL (ref 40–75)
HDLC SERPL: 30.6 % (ref 20–50)
HGB BLD-MCNC: 14.2 G/DL (ref 12–16)
IMM GRANULOCYTES # BLD AUTO: 0.03 K/UL (ref 0–0.04)
IMM GRANULOCYTES NFR BLD AUTO: 0.8 % (ref 0–0.5)
IRON SERPL-MCNC: 140 UG/DL (ref 30–160)
LDLC SERPL CALC-MCNC: 135.6 MG/DL (ref 63–159)
LYMPHOCYTES # BLD AUTO: 1.5 K/UL (ref 1–4.8)
LYMPHOCYTES NFR BLD: 39.5 % (ref 18–48)
MAGNESIUM SERPL-MCNC: 2 MG/DL (ref 1.6–2.6)
MCH RBC QN AUTO: 30.6 PG (ref 27–31)
MCHC RBC AUTO-ENTMCNC: 32.9 G/DL (ref 32–36)
MCV RBC AUTO: 93 FL (ref 82–98)
MONOCYTES # BLD AUTO: 0.3 K/UL (ref 0.3–1)
MONOCYTES NFR BLD: 9.3 % (ref 4–15)
NEUTROPHILS # BLD AUTO: 1.5 K/UL (ref 1.8–7.7)
NEUTROPHILS NFR BLD: 41.7 % (ref 38–73)
NONHDLC SERPL-MCNC: 150 MG/DL
NRBC BLD-RTO: 0 /100 WBC
PLATELET # BLD AUTO: 265 K/UL (ref 150–350)
PMV BLD AUTO: 10.4 FL (ref 9.2–12.9)
POTASSIUM SERPL-SCNC: 4.2 MMOL/L (ref 3.5–5.1)
PROT SERPL-MCNC: 6.3 G/DL (ref 6–8.4)
RBC # BLD AUTO: 4.64 M/UL (ref 4–5.4)
SATURATED IRON: 39 % (ref 20–50)
SODIUM SERPL-SCNC: 142 MMOL/L (ref 136–145)
TOTAL IRON BINDING CAPACITY: 357 UG/DL (ref 250–450)
TRANSFERRIN SERPL-MCNC: 241 MG/DL (ref 200–375)
TRIGL SERPL-MCNC: 72 MG/DL (ref 30–150)
VIT B12 SERPL-MCNC: 1126 PG/ML (ref 210–950)
WBC # BLD AUTO: 3.67 K/UL (ref 3.9–12.7)

## 2020-10-28 PROCEDURE — 82607 VITAMIN B-12: CPT

## 2020-10-28 PROCEDURE — 80053 COMPREHEN METABOLIC PANEL: CPT

## 2020-10-28 PROCEDURE — 82728 ASSAY OF FERRITIN: CPT

## 2020-10-28 PROCEDURE — 82306 VITAMIN D 25 HYDROXY: CPT

## 2020-10-28 PROCEDURE — 83735 ASSAY OF MAGNESIUM: CPT

## 2020-10-28 PROCEDURE — 85025 COMPLETE CBC W/AUTO DIFF WBC: CPT

## 2020-10-28 PROCEDURE — 80061 LIPID PANEL: CPT

## 2020-10-28 PROCEDURE — 36415 COLL VENOUS BLD VENIPUNCTURE: CPT

## 2020-10-28 PROCEDURE — 83540 ASSAY OF IRON: CPT

## 2020-11-02 ENCOUNTER — PATIENT MESSAGE (OUTPATIENT)
Dept: INTERNAL MEDICINE | Facility: CLINIC | Age: 60
End: 2020-11-02

## 2020-11-02 ENCOUNTER — OFFICE VISIT (OUTPATIENT)
Dept: INTERNAL MEDICINE | Facility: CLINIC | Age: 60
End: 2020-11-02
Payer: COMMERCIAL

## 2020-11-02 ENCOUNTER — PATIENT MESSAGE (OUTPATIENT)
Dept: PHARMACY | Facility: CLINIC | Age: 60
End: 2020-11-02

## 2020-11-02 ENCOUNTER — IMMUNIZATION (OUTPATIENT)
Dept: PHARMACY | Facility: CLINIC | Age: 60
End: 2020-11-02
Payer: COMMERCIAL

## 2020-11-02 VITALS
OXYGEN SATURATION: 96 % | BODY MASS INDEX: 24.39 KG/M2 | SYSTOLIC BLOOD PRESSURE: 98 MMHG | HEIGHT: 64 IN | WEIGHT: 142.88 LBS | DIASTOLIC BLOOD PRESSURE: 62 MMHG | HEART RATE: 72 BPM

## 2020-11-02 DIAGNOSIS — Z98.84 HISTORY OF GASTRIC BYPASS: ICD-10-CM

## 2020-11-02 DIAGNOSIS — K76.0 NAFL (NONALCOHOLIC FATTY LIVER): ICD-10-CM

## 2020-11-02 DIAGNOSIS — R74.01 ELEVATED ALT MEASUREMENT: ICD-10-CM

## 2020-11-02 DIAGNOSIS — Z00.00 WELLNESS EXAMINATION: Primary | ICD-10-CM

## 2020-11-02 DIAGNOSIS — R76.8 COVID-19 VIRUS IGG ANTIBODY DETECTED: ICD-10-CM

## 2020-11-02 PROCEDURE — 3008F PR BODY MASS INDEX (BMI) DOCUMENTED: ICD-10-PCS | Mod: CPTII,S$GLB,, | Performed by: INTERNAL MEDICINE

## 2020-11-02 PROCEDURE — 99999 PR PBB SHADOW E&M-EST. PATIENT-LVL IV: CPT | Mod: PBBFAC,,, | Performed by: INTERNAL MEDICINE

## 2020-11-02 PROCEDURE — 99999 PR PBB SHADOW E&M-EST. PATIENT-LVL IV: ICD-10-PCS | Mod: PBBFAC,,, | Performed by: INTERNAL MEDICINE

## 2020-11-02 PROCEDURE — 99396 PREV VISIT EST AGE 40-64: CPT | Mod: S$GLB,,, | Performed by: INTERNAL MEDICINE

## 2020-11-02 PROCEDURE — 99396 PR PREVENTIVE VISIT,EST,40-64: ICD-10-PCS | Mod: S$GLB,,, | Performed by: INTERNAL MEDICINE

## 2020-11-02 PROCEDURE — 3008F BODY MASS INDEX DOCD: CPT | Mod: CPTII,S$GLB,, | Performed by: INTERNAL MEDICINE

## 2020-11-02 RX ORDER — BIOTIN 1 MG
1000 TABLET ORAL 3 TIMES DAILY
COMMUNITY

## 2020-11-02 RX ORDER — METRONIDAZOLE 10 MG/G
GEL TOPICAL DAILY
Qty: 60 G | Refills: 1 | Status: SHIPPED | OUTPATIENT
Start: 2020-11-02 | End: 2021-11-02

## 2020-11-02 RX ORDER — DESLORATADINE 5 MG/1
5 TABLET ORAL DAILY
Qty: 90 TABLET | Refills: 3 | Status: SHIPPED | OUTPATIENT
Start: 2020-11-02 | End: 2021-12-01 | Stop reason: SDUPTHER

## 2020-11-02 NOTE — PROGRESS NOTES
Subjective:      Patient ID: Ayah Ceron is a 59 y.o. female.    Chief Complaint: Annual Exam    HPI:  HPI   Patient is here for her annual exam:    Annual exam:11/2/2020  Colonoscopy: 2012 follow up in 7 years no family of personal history of colon polyps: Fit card : 8/2020  Mammogram: scheduled  Friday  Gyn: hysterectomy and oophorectomy: 2018  Every 3-5 yeares  Optho:Dr. Suarez  1/2020  Flu: done   Tetanus:10/14/2014  Shingrix: discussed  Pneumovax: due 67  Prevnar: due 66  Bone Density:: not due        Consultants  Bariatric Surgery follow up:Brittaney: every 2 years  Hepatology : Dr. Leiva no follow up needed  Dr. Suarez yearly  Dr. Blanchard:rectocele surgery  Sleep Disorders: Sleep apnea:patient uses sleep apnea equipment 7/2019               Patient Active Problem List   Diagnosis    Functional dyspareunia    Steatosis    Deviated septum    Sinus problem    History of gastric bypass    Vaginal relaxation    Incomplete defecation    Rectocele    Atrophic vaginitis    NAFL (nonalcoholic fatty liver)    COVID-19 virus IgG antibody detected     Past Medical History:   Diagnosis Date    Anxiety     situational    Deviated septum     Fibroid     Hordeolum externum of right lower eyelid     Hyperlipemia     Liver disease     fatty liver    PONV (postoperative nausea and vomiting)     Sleep apnea     cpap pressure 8    Steatosis      Past Surgical History:   Procedure Laterality Date    CARPAL TUNNEL RELEASE      DILATION AND CURETTAGE OF UTERUS      GASTRIC BYPASS  04/25/2011    HEMORRHOID SURGERY      HYSTERECTOMY      TVH BSO    LASIK      10/26/03    TONSILLECTOMY, ADENOIDECTOMY      TUBAL LIGATION      WISDOM TOOTH EXTRACTION       Family History   Problem Relation Age of Onset    Hypothyroidism Mother         had thyroidectomy    Hypertension Father     Arthritis Father     Hyperlipidemia Father     Eczema Daughter     Acne Daughter     Acne Son     Breast cancer Paternal Aunt   "   Breast cancer Paternal Grandmother     Psoriasis Neg Hx     Lupus Neg Hx     Ovarian cancer Neg Hx     Cervical cancer Neg Hx     Endometrial cancer Neg Hx     Vaginal cancer Neg Hx      Review of Systems   Constitutional: Negative for chills, fever and unexpected weight change.   HENT: Negative for trouble swallowing.    Respiratory: Negative for cough, shortness of breath and wheezing.    Cardiovascular: Negative for chest pain and palpitations.   Gastrointestinal: Negative for abdominal distention, abdominal pain, blood in stool and vomiting.   Musculoskeletal: Negative for back pain.     Objective:     Vitals:    11/02/20 0949   BP: 98/62   Pulse: 72   SpO2: 96%   Weight: 64.8 kg (142 lb 13.7 oz)   Height: 5' 4" (1.626 m)   PainSc: 0-No pain     Body mass index is 24.52 kg/m².  Physical Exam  Constitutional:       General: She is not in acute distress.     Appearance: Normal appearance. She is well-developed.   HENT:      Left Ear: Tympanic membrane normal.   Neck:      Thyroid: No thyromegaly.      Vascular: No carotid bruit.   Cardiovascular:      Rate and Rhythm: Normal rate and regular rhythm.      Chest Wall: PMI is not displaced.      Heart sounds: Normal heart sounds.   Pulmonary:      Effort: Pulmonary effort is normal. No respiratory distress.      Breath sounds: Normal breath sounds.   Abdominal:      General: Bowel sounds are normal. There is no distension.      Palpations: Abdomen is soft.      Tenderness: There is no abdominal tenderness.   Neurological:      Mental Status: She is alert and oriented to person, place, and time.   Psychiatric:         Mood and Affect: Mood normal.         Behavior: Behavior normal.         Thought Content: Thought content normal.         Judgment: Judgment normal.       Assessment:     1. Wellness examination    2. History of gastric bypass    3. NAFL (nonalcoholic fatty liver)    4. Elevated ALT measurement    5. COVID-19 virus IgG antibody detected  "     Plan:   Ayah was seen today for annual exam.    Diagnoses and all orders for this visit:    Wellness examination: discussed    History of gastric bypass: follows every 2 years    NAFL (nonalcoholic fatty liver):    Elevated ALT measurement  -     Hepatic Function Panel; Future        Problem List Items Addressed This Visit     History of gastric bypass    NAFL (nonalcoholic fatty liver)    COVID-19 virus IgG antibody detected    Overview     3/7           Other Visit Diagnoses     Wellness examination    -  Primary    Elevated ALT measurement        Relevant Orders    Hepatic Function Panel        Orders Placed This Encounter   Procedures    Hepatic Function Panel     Standing Status:   Future     Standing Expiration Date:   11/2/2021     Follow up in about 1 year (around 11/2/2021) for Annual exam.     Medication List          Accurate as of November 2, 2020  4:52 PM. If you have any questions, ask your nurse or doctor.            START taking these medications    metronidazole 1% 1 % Gel  Commonly known as: METROGEL  Apply topically once daily.  Started by: Elena Ortiz MD     SHINGRIX (PF) 50 mcg/0.5 mL injection  Generic drug: varicella-zoster [id number] (PF)  Inject 0.5 mLs into the muscle once. for 1 dose        CONTINUE taking these medications    biotin 1 mg tablet     calcium citrate-vitamin D3 500 mg calcium -400 unit Chew     desloratadine 5 mg tablet  Commonly known as: CLARINEX  Take 1 tablet (5 mg total) by mouth once daily.     docusate sodium 100 MG capsule  Commonly known as: COLACE     doxycycline monohydrate 100 mg Tab  Take 1 tablet (100 mg total) by mouth daily     * ivermectin 3 mg Tab  Commonly known as: STROMECTOL  Take 6 pills together then repeat in 2 weeks     * ivermectin 1 % Crea  Commonly known as: SOOLANTRA  Apply to affected area of face every night and wash off in morning     MAG-OXIDE ORAL     multivitamin capsule     PREMARIN vaginal cream  Generic drug: conjugated  estrogens  Place 0.5 g vaginally twice a week.     sulfacetamide sodium-sulfur 8-4 % Susp  Commonly known as: SULFACLEANSE 8-4  Wash face qd - bid     SUPER B COMPLEX ORAL     VITAMIN B-12 500 MCG tablet  Generic drug: cyanocobalamin         * This list has 2 medication(s) that are the same as other medications prescribed for you. Read the directions carefully, and ask your doctor or other care provider to review them with you.               Where to Get Your Medications      These medications were sent to [facility]  [address] [facility] [address]    Hours: Mon-Fri 7a-7p, Sat-Sun 10a-4p Phone: [phone]   · desloratadine 5 mg tablet  · metronidazole 1% 1 % Gel     These medications were sent to [facility]  [address] [address]    Hours: Mon-Fri, 8a-5:30p Phone: [phone]   · SHINGRIX (PF) 50 mcg/0.5 mL injection

## 2020-11-02 NOTE — PATIENT INSTRUCTIONS
New shingles vaccine: SHINGRIX ( 2018) not live, 90%,  2 shots, one at day zero and the 2nd at 2-6 months: any pharmacy can give it.    Sleep clinic: 7/2020 due     You can do a hepatic function any time you wish: could be booked at [phone]

## 2020-11-13 ENCOUNTER — HOSPITAL ENCOUNTER (OUTPATIENT)
Dept: RADIOLOGY | Facility: HOSPITAL | Age: 60
Discharge: HOME | End: 2020-11-13
Attending: INTERNAL MEDICINE
Payer: COMMERCIAL

## 2020-11-13 DIAGNOSIS — Z12.31 ENCOUNTER FOR SCREENING MAMMOGRAM FOR BREAST CANCER: ICD-10-CM

## 2020-11-13 PROCEDURE — 77063 BREAST TOMOSYNTHESIS BI: CPT | Mod: 26,,, | Performed by: RADIOLOGY

## 2020-11-13 PROCEDURE — 77067 SCR MAMMO BI INCL CAD: CPT | Mod: 26,,, | Performed by: RADIOLOGY

## 2020-11-13 PROCEDURE — 77063 MAMMO DIGITAL SCREENING BILAT WITH TOMO: ICD-10-PCS | Mod: 26,,, | Performed by: RADIOLOGY

## 2020-11-13 PROCEDURE — 77067 MAMMO DIGITAL SCREENING BILAT WITH TOMO: ICD-10-PCS | Mod: 26,,, | Performed by: RADIOLOGY

## 2020-11-13 PROCEDURE — 77067 SCR MAMMO BI INCL CAD: CPT | Mod: TC

## 2021-01-04 ENCOUNTER — IMMUNIZATION (OUTPATIENT)
Dept: PHARMACY | Facility: CLINIC | Age: 61
End: 2021-01-04
Payer: COMMERCIAL

## 2021-01-04 ENCOUNTER — TELEPHONE (OUTPATIENT)
Dept: INTERNAL MEDICINE | Facility: CLINIC | Age: 61
End: 2021-01-04

## 2021-03-05 ENCOUNTER — PATIENT MESSAGE (OUTPATIENT)
Dept: ADMINISTRATIVE | Facility: HOSPITAL | Age: 61
End: 2021-03-05

## 2021-03-05 ENCOUNTER — PATIENT OUTREACH (OUTPATIENT)
Dept: ADMINISTRATIVE | Facility: OTHER | Age: 61
End: 2021-03-05

## 2021-03-06 ENCOUNTER — IMMUNIZATION (OUTPATIENT)
Dept: PRIMARY CARE CLINIC | Facility: CLINIC | Age: 61
End: 2021-03-06
Payer: COMMERCIAL

## 2021-03-06 DIAGNOSIS — Z23 NEED FOR VACCINATION: Primary | ICD-10-CM

## 2021-03-06 PROCEDURE — 0001A PR IMMUNIZ ADMIN, SARS-COV-2 COVID-19 VACC, 30MCG/0.3ML, 1ST DOSE: CPT | Mod: CV19,S$GLB,, | Performed by: INTERNAL MEDICINE

## 2021-03-06 PROCEDURE — 91300 PR SARS-COV- 2 COVID-19 VACCINE, NO PRSV, 30MCG/0.3ML, IM: ICD-10-PCS | Mod: S$GLB,,, | Performed by: INTERNAL MEDICINE

## 2021-03-06 PROCEDURE — 0001A PR IMMUNIZ ADMIN, SARS-COV-2 COVID-19 VACC, 30MCG/0.3ML, 1ST DOSE: ICD-10-PCS | Mod: CV19,S$GLB,, | Performed by: INTERNAL MEDICINE

## 2021-03-06 PROCEDURE — 91300 PR SARS-COV- 2 COVID-19 VACCINE, NO PRSV, 30MCG/0.3ML, IM: CPT | Mod: S$GLB,,, | Performed by: INTERNAL MEDICINE

## 2021-03-06 RX ADMIN — Medication 0.3 ML: at 01:03

## 2021-03-08 ENCOUNTER — OFFICE VISIT (OUTPATIENT)
Dept: OPTOMETRY | Facility: CLINIC | Age: 61
End: 2021-03-08
Payer: COMMERCIAL

## 2021-03-08 DIAGNOSIS — Z01.00 EXAMINATION OF EYES AND VISION: Primary | ICD-10-CM

## 2021-03-08 DIAGNOSIS — H52.4 PRESBYOPIA: ICD-10-CM

## 2021-03-08 PROCEDURE — 99999 PR PBB SHADOW E&M-EST. PATIENT-LVL III: ICD-10-PCS | Mod: PBBFAC,,, | Performed by: OPTOMETRIST

## 2021-03-08 PROCEDURE — 92014 COMPRE OPH EXAM EST PT 1/>: CPT | Mod: S$GLB,,, | Performed by: OPTOMETRIST

## 2021-03-08 PROCEDURE — 92014 PR EYE EXAM, EST PATIENT,COMPREHESV: ICD-10-PCS | Mod: S$GLB,,, | Performed by: OPTOMETRIST

## 2021-03-08 PROCEDURE — 99999 PR PBB SHADOW E&M-EST. PATIENT-LVL III: CPT | Mod: PBBFAC,,, | Performed by: OPTOMETRIST

## 2021-03-27 ENCOUNTER — IMMUNIZATION (OUTPATIENT)
Dept: PRIMARY CARE CLINIC | Facility: CLINIC | Age: 61
End: 2021-03-27
Payer: COMMERCIAL

## 2021-03-27 DIAGNOSIS — Z23 NEED FOR VACCINATION: Primary | ICD-10-CM

## 2021-03-27 PROCEDURE — 91300 PR SARS-COV- 2 COVID-19 VACCINE, NO PRSV, 30MCG/0.3ML, IM: ICD-10-PCS | Mod: S$GLB,,, | Performed by: INTERNAL MEDICINE

## 2021-03-27 PROCEDURE — 91300 PR SARS-COV- 2 COVID-19 VACCINE, NO PRSV, 30MCG/0.3ML, IM: CPT | Mod: S$GLB,,, | Performed by: INTERNAL MEDICINE

## 2021-03-27 PROCEDURE — 0002A PR IMMUNIZ ADMIN, SARS-COV-2 COVID-19 VACC, 30MCG/0.3ML, 2ND DOSE: CPT | Mod: CV19,S$GLB,, | Performed by: INTERNAL MEDICINE

## 2021-03-27 PROCEDURE — 0002A PR IMMUNIZ ADMIN, SARS-COV-2 COVID-19 VACC, 30MCG/0.3ML, 2ND DOSE: ICD-10-PCS | Mod: CV19,S$GLB,, | Performed by: INTERNAL MEDICINE

## 2021-03-27 RX ADMIN — Medication 0.3 ML: at 01:03

## 2021-05-12 ENCOUNTER — CLINICAL SUPPORT (OUTPATIENT)
Dept: OTHER | Facility: CLINIC | Age: 61
End: 2021-05-12
Payer: COMMERCIAL

## 2021-05-12 DIAGNOSIS — Z00.8 ENCOUNTER FOR OTHER GENERAL EXAMINATION: ICD-10-CM

## 2021-05-13 VITALS — BODY MASS INDEX: 24.52 KG/M2 | HEIGHT: 64 IN

## 2021-05-13 LAB
GLUCOSE SERPL-MCNC: 96 MG/DL (ref 60–140)
HDLC SERPL-MCNC: 64 MG/DL
POC CHOLESTEROL, LDL (DOCK): 111 MG/DL
POC CHOLESTEROL, TOTAL: 215 MG/DL
TRIGL SERPL-MCNC: 193 MG/DL

## 2021-09-21 ENCOUNTER — PATIENT MESSAGE (OUTPATIENT)
Dept: SLEEP MEDICINE | Facility: CLINIC | Age: 61
End: 2021-09-21

## 2021-09-21 ENCOUNTER — OFFICE VISIT (OUTPATIENT)
Dept: SLEEP MEDICINE | Facility: CLINIC | Age: 61
End: 2021-09-21
Payer: COMMERCIAL

## 2021-09-21 VITALS
SYSTOLIC BLOOD PRESSURE: 118 MMHG | DIASTOLIC BLOOD PRESSURE: 70 MMHG | HEIGHT: 64 IN | BODY MASS INDEX: 24.99 KG/M2 | WEIGHT: 146.38 LBS | HEART RATE: 70 BPM

## 2021-09-21 DIAGNOSIS — G47.33 OBSTRUCTIVE SLEEP APNEA: ICD-10-CM

## 2021-09-21 PROCEDURE — 1159F PR MEDICATION LIST DOCUMENTED IN MEDICAL RECORD: ICD-10-PCS | Mod: CPTII,S$GLB,, | Performed by: NURSE PRACTITIONER

## 2021-09-21 PROCEDURE — 99213 OFFICE O/P EST LOW 20 MIN: CPT | Mod: S$GLB,,, | Performed by: NURSE PRACTITIONER

## 2021-09-21 PROCEDURE — 3078F PR MOST RECENT DIASTOLIC BLOOD PRESSURE < 80 MM HG: ICD-10-PCS | Mod: CPTII,S$GLB,, | Performed by: NURSE PRACTITIONER

## 2021-09-21 PROCEDURE — 3008F BODY MASS INDEX DOCD: CPT | Mod: CPTII,S$GLB,, | Performed by: NURSE PRACTITIONER

## 2021-09-21 PROCEDURE — 1159F MED LIST DOCD IN RCRD: CPT | Mod: CPTII,S$GLB,, | Performed by: NURSE PRACTITIONER

## 2021-09-21 PROCEDURE — 99213 PR OFFICE/OUTPT VISIT, EST, LEVL III, 20-29 MIN: ICD-10-PCS | Mod: S$GLB,,, | Performed by: NURSE PRACTITIONER

## 2021-09-21 PROCEDURE — 3074F PR MOST RECENT SYSTOLIC BLOOD PRESSURE < 130 MM HG: ICD-10-PCS | Mod: CPTII,S$GLB,, | Performed by: NURSE PRACTITIONER

## 2021-09-21 PROCEDURE — 3074F SYST BP LT 130 MM HG: CPT | Mod: CPTII,S$GLB,, | Performed by: NURSE PRACTITIONER

## 2021-09-21 PROCEDURE — 3078F DIAST BP <80 MM HG: CPT | Mod: CPTII,S$GLB,, | Performed by: NURSE PRACTITIONER

## 2021-09-21 PROCEDURE — 3008F PR BODY MASS INDEX (BMI) DOCUMENTED: ICD-10-PCS | Mod: CPTII,S$GLB,, | Performed by: NURSE PRACTITIONER

## 2021-09-21 PROCEDURE — 99999 PR PBB SHADOW E&M-EST. PATIENT-LVL III: CPT | Mod: PBBFAC,,, | Performed by: NURSE PRACTITIONER

## 2021-09-21 PROCEDURE — 99999 PR PBB SHADOW E&M-EST. PATIENT-LVL III: ICD-10-PCS | Mod: PBBFAC,,, | Performed by: NURSE PRACTITIONER

## 2021-10-06 ENCOUNTER — PATIENT MESSAGE (OUTPATIENT)
Dept: SLEEP MEDICINE | Facility: CLINIC | Age: 61
End: 2021-10-06

## 2021-11-01 ENCOUNTER — OFFICE VISIT (OUTPATIENT)
Dept: INTERNAL MEDICINE | Facility: CLINIC | Age: 61
End: 2021-11-01
Payer: COMMERCIAL

## 2021-11-01 ENCOUNTER — LAB VISIT (OUTPATIENT)
Dept: LAB | Facility: HOSPITAL | Age: 61
End: 2021-11-01
Attending: INTERNAL MEDICINE
Payer: COMMERCIAL

## 2021-11-01 VITALS
WEIGHT: 148.13 LBS | HEART RATE: 75 BPM | DIASTOLIC BLOOD PRESSURE: 70 MMHG | HEIGHT: 64 IN | OXYGEN SATURATION: 97 % | SYSTOLIC BLOOD PRESSURE: 122 MMHG | BODY MASS INDEX: 25.29 KG/M2

## 2021-11-01 DIAGNOSIS — G47.33 OBSTRUCTIVE SLEEP APNEA: ICD-10-CM

## 2021-11-01 DIAGNOSIS — Z12.31 ENCOUNTER FOR SCREENING MAMMOGRAM FOR BREAST CANCER: ICD-10-CM

## 2021-11-01 DIAGNOSIS — Z00.00 WELLNESS EXAMINATION: ICD-10-CM

## 2021-11-01 DIAGNOSIS — Z00.00 WELLNESS EXAMINATION: Primary | ICD-10-CM

## 2021-11-01 DIAGNOSIS — Z98.84 HISTORY OF GASTRIC BYPASS: ICD-10-CM

## 2021-11-01 DIAGNOSIS — K76.0 NAFL (NONALCOHOLIC FATTY LIVER): ICD-10-CM

## 2021-11-01 DIAGNOSIS — G44.89 OTHER HEADACHE SYNDROME: ICD-10-CM

## 2021-11-01 DIAGNOSIS — Z12.11 COLON CANCER SCREENING: ICD-10-CM

## 2021-11-01 LAB
25(OH)D3+25(OH)D2 SERPL-MCNC: 54 NG/ML (ref 30–96)
ALBUMIN SERPL BCP-MCNC: 4.2 G/DL (ref 3.5–5.2)
ALP SERPL-CCNC: 103 U/L (ref 55–135)
ALT SERPL W/O P-5'-P-CCNC: 80 U/L (ref 10–44)
ANION GAP SERPL CALC-SCNC: 13 MMOL/L (ref 8–16)
AST SERPL-CCNC: 44 U/L (ref 10–40)
BASOPHILS # BLD AUTO: 0.06 K/UL (ref 0–0.2)
BASOPHILS NFR BLD: 1.6 % (ref 0–1.9)
BILIRUB SERPL-MCNC: 0.5 MG/DL (ref 0.1–1)
BUN SERPL-MCNC: 13 MG/DL (ref 6–20)
CALCIUM SERPL-MCNC: 10.4 MG/DL (ref 8.7–10.5)
CHLORIDE SERPL-SCNC: 102 MMOL/L (ref 95–110)
CHOLEST SERPL-MCNC: 237 MG/DL (ref 120–199)
CHOLEST/HDLC SERPL: 3.6 {RATIO} (ref 2–5)
CO2 SERPL-SCNC: 28 MMOL/L (ref 23–29)
CREAT SERPL-MCNC: 0.7 MG/DL (ref 0.5–1.4)
DIFFERENTIAL METHOD: ABNORMAL
EOSINOPHIL # BLD AUTO: 0.3 K/UL (ref 0–0.5)
EOSINOPHIL NFR BLD: 7.3 % (ref 0–8)
ERYTHROCYTE [DISTWIDTH] IN BLOOD BY AUTOMATED COUNT: 12.7 % (ref 11.5–14.5)
EST. GFR  (AFRICAN AMERICAN): >60 ML/MIN/1.73 M^2
EST. GFR  (NON AFRICAN AMERICAN): >60 ML/MIN/1.73 M^2
FERRITIN SERPL-MCNC: 71 NG/ML (ref 20–300)
GLUCOSE SERPL-MCNC: 95 MG/DL (ref 70–110)
HCT VFR BLD AUTO: 45.3 % (ref 37–48.5)
HDLC SERPL-MCNC: 66 MG/DL (ref 40–75)
HDLC SERPL: 27.8 % (ref 20–50)
HGB BLD-MCNC: 14.8 G/DL (ref 12–16)
IMM GRANULOCYTES # BLD AUTO: 0.02 K/UL (ref 0–0.04)
IMM GRANULOCYTES NFR BLD AUTO: 0.5 % (ref 0–0.5)
IRON SERPL-MCNC: 131 UG/DL (ref 30–160)
LDLC SERPL CALC-MCNC: 151.6 MG/DL (ref 63–159)
LYMPHOCYTES # BLD AUTO: 1.4 K/UL (ref 1–4.8)
LYMPHOCYTES NFR BLD: 35.2 % (ref 18–48)
MAGNESIUM SERPL-MCNC: 2 MG/DL (ref 1.6–2.6)
MCH RBC QN AUTO: 30.5 PG (ref 27–31)
MCHC RBC AUTO-ENTMCNC: 32.7 G/DL (ref 32–36)
MCV RBC AUTO: 93 FL (ref 82–98)
MONOCYTES # BLD AUTO: 0.4 K/UL (ref 0.3–1)
MONOCYTES NFR BLD: 10.2 % (ref 4–15)
NEUTROPHILS # BLD AUTO: 1.7 K/UL (ref 1.8–7.7)
NEUTROPHILS NFR BLD: 45.2 % (ref 38–73)
NONHDLC SERPL-MCNC: 171 MG/DL
NRBC BLD-RTO: 0 /100 WBC
PLATELET # BLD AUTO: 301 K/UL (ref 150–450)
PMV BLD AUTO: 10.7 FL (ref 9.2–12.9)
POTASSIUM SERPL-SCNC: 3.9 MMOL/L (ref 3.5–5.1)
PROT SERPL-MCNC: 6.9 G/DL (ref 6–8.4)
RBC # BLD AUTO: 4.86 M/UL (ref 4–5.4)
SATURATED IRON: 35 % (ref 20–50)
SODIUM SERPL-SCNC: 143 MMOL/L (ref 136–145)
TOTAL IRON BINDING CAPACITY: 374 UG/DL (ref 250–450)
TRANSFERRIN SERPL-MCNC: 253 MG/DL (ref 200–375)
TRIGL SERPL-MCNC: 97 MG/DL (ref 30–150)
TSH SERPL DL<=0.005 MIU/L-ACNC: 2.68 UIU/ML (ref 0.4–4)
VIT B12 SERPL-MCNC: 1259 PG/ML (ref 210–950)
WBC # BLD AUTO: 3.84 K/UL (ref 3.9–12.7)

## 2021-11-01 PROCEDURE — 99396 PREV VISIT EST AGE 40-64: CPT | Mod: S$GLB,,, | Performed by: INTERNAL MEDICINE

## 2021-11-01 PROCEDURE — 99999 PR PBB SHADOW E&M-EST. PATIENT-LVL IV: CPT | Mod: PBBFAC,,, | Performed by: INTERNAL MEDICINE

## 2021-11-01 PROCEDURE — 85025 COMPLETE CBC W/AUTO DIFF WBC: CPT | Performed by: INTERNAL MEDICINE

## 2021-11-01 PROCEDURE — 36415 COLL VENOUS BLD VENIPUNCTURE: CPT | Performed by: INTERNAL MEDICINE

## 2021-11-01 PROCEDURE — 3074F SYST BP LT 130 MM HG: CPT | Mod: CPTII,S$GLB,, | Performed by: INTERNAL MEDICINE

## 2021-11-01 PROCEDURE — 84466 ASSAY OF TRANSFERRIN: CPT | Performed by: INTERNAL MEDICINE

## 2021-11-01 PROCEDURE — 3074F PR MOST RECENT SYSTOLIC BLOOD PRESSURE < 130 MM HG: ICD-10-PCS | Mod: CPTII,S$GLB,, | Performed by: INTERNAL MEDICINE

## 2021-11-01 PROCEDURE — 83735 ASSAY OF MAGNESIUM: CPT | Performed by: INTERNAL MEDICINE

## 2021-11-01 PROCEDURE — 1159F MED LIST DOCD IN RCRD: CPT | Mod: CPTII,S$GLB,, | Performed by: INTERNAL MEDICINE

## 2021-11-01 PROCEDURE — 80053 COMPREHEN METABOLIC PANEL: CPT | Performed by: INTERNAL MEDICINE

## 2021-11-01 PROCEDURE — 3008F PR BODY MASS INDEX (BMI) DOCUMENTED: ICD-10-PCS | Mod: CPTII,S$GLB,, | Performed by: INTERNAL MEDICINE

## 2021-11-01 PROCEDURE — 82306 VITAMIN D 25 HYDROXY: CPT | Performed by: INTERNAL MEDICINE

## 2021-11-01 PROCEDURE — 3078F DIAST BP <80 MM HG: CPT | Mod: CPTII,S$GLB,, | Performed by: INTERNAL MEDICINE

## 2021-11-01 PROCEDURE — 82607 VITAMIN B-12: CPT | Performed by: INTERNAL MEDICINE

## 2021-11-01 PROCEDURE — 82728 ASSAY OF FERRITIN: CPT | Performed by: INTERNAL MEDICINE

## 2021-11-01 PROCEDURE — 1159F PR MEDICATION LIST DOCUMENTED IN MEDICAL RECORD: ICD-10-PCS | Mod: CPTII,S$GLB,, | Performed by: INTERNAL MEDICINE

## 2021-11-01 PROCEDURE — 99396 PR PREVENTIVE VISIT,EST,40-64: ICD-10-PCS | Mod: S$GLB,,, | Performed by: INTERNAL MEDICINE

## 2021-11-01 PROCEDURE — 99999 PR PBB SHADOW E&M-EST. PATIENT-LVL IV: ICD-10-PCS | Mod: PBBFAC,,, | Performed by: INTERNAL MEDICINE

## 2021-11-01 PROCEDURE — 84443 ASSAY THYROID STIM HORMONE: CPT | Performed by: INTERNAL MEDICINE

## 2021-11-01 PROCEDURE — 80061 LIPID PANEL: CPT | Performed by: INTERNAL MEDICINE

## 2021-11-01 PROCEDURE — 3008F BODY MASS INDEX DOCD: CPT | Mod: CPTII,S$GLB,, | Performed by: INTERNAL MEDICINE

## 2021-11-01 PROCEDURE — 84425 ASSAY OF VITAMIN B-1: CPT | Performed by: INTERNAL MEDICINE

## 2021-11-01 PROCEDURE — 3078F PR MOST RECENT DIASTOLIC BLOOD PRESSURE < 80 MM HG: ICD-10-PCS | Mod: CPTII,S$GLB,, | Performed by: INTERNAL MEDICINE

## 2021-11-05 LAB — VIT B1 BLD-MCNC: 111 UG/L (ref 38–122)

## 2021-11-09 ENCOUNTER — HOSPITAL ENCOUNTER (OUTPATIENT)
Dept: RADIOLOGY | Facility: HOSPITAL | Age: 61
Discharge: HOME | End: 2021-11-09
Attending: INTERNAL MEDICINE
Payer: COMMERCIAL

## 2021-11-09 ENCOUNTER — PATIENT MESSAGE (OUTPATIENT)
Dept: INTERNAL MEDICINE | Facility: CLINIC | Age: 61
End: 2021-11-09
Payer: COMMERCIAL

## 2021-11-09 DIAGNOSIS — G44.89 OTHER HEADACHE SYNDROME: ICD-10-CM

## 2021-11-09 PROCEDURE — 70450 CT HEAD WITHOUT CONTRAST: ICD-10-PCS | Mod: 26,,, | Performed by: RADIOLOGY

## 2021-11-09 PROCEDURE — 70450 CT HEAD/BRAIN W/O DYE: CPT | Mod: 26,,, | Performed by: RADIOLOGY

## 2021-11-09 PROCEDURE — 70450 CT HEAD/BRAIN W/O DYE: CPT | Mod: TC

## 2021-11-30 ENCOUNTER — HOSPITAL ENCOUNTER (OUTPATIENT)
Dept: RADIOLOGY | Facility: HOSPITAL | Age: 61
Discharge: HOME | End: 2021-11-30
Attending: INTERNAL MEDICINE
Payer: COMMERCIAL

## 2021-11-30 DIAGNOSIS — Z12.31 ENCOUNTER FOR SCREENING MAMMOGRAM FOR BREAST CANCER: ICD-10-CM

## 2021-11-30 PROCEDURE — 77063 BREAST TOMOSYNTHESIS BI: CPT | Mod: 26,,, | Performed by: RADIOLOGY

## 2021-11-30 PROCEDURE — 77067 MAMMO DIGITAL SCREENING BILAT WITH TOMO: ICD-10-PCS | Mod: 26,,, | Performed by: RADIOLOGY

## 2021-11-30 PROCEDURE — 77067 SCR MAMMO BI INCL CAD: CPT | Mod: TC

## 2021-11-30 PROCEDURE — 77063 MAMMO DIGITAL SCREENING BILAT WITH TOMO: ICD-10-PCS | Mod: 26,,, | Performed by: RADIOLOGY

## 2021-11-30 PROCEDURE — 77067 SCR MAMMO BI INCL CAD: CPT | Mod: 26,,, | Performed by: RADIOLOGY

## 2021-12-01 ENCOUNTER — PATIENT MESSAGE (OUTPATIENT)
Dept: INTERNAL MEDICINE | Facility: CLINIC | Age: 61
End: 2021-12-01
Payer: COMMERCIAL

## 2021-12-02 RX ORDER — DESLORATADINE 5 MG/1
5 TABLET ORAL DAILY
Qty: 90 TABLET | Refills: 3 | Status: SHIPPED | OUTPATIENT
Start: 2021-12-02 | End: 2022-11-02 | Stop reason: SDUPTHER

## 2021-12-06 ENCOUNTER — PATIENT MESSAGE (OUTPATIENT)
Dept: NEUROLOGY | Facility: CLINIC | Age: 61
End: 2021-12-06

## 2021-12-06 ENCOUNTER — OFFICE VISIT (OUTPATIENT)
Dept: NEUROLOGY | Facility: CLINIC | Age: 61
End: 2021-12-06
Payer: COMMERCIAL

## 2021-12-06 VITALS
HEART RATE: 71 BPM | WEIGHT: 147.69 LBS | HEIGHT: 64 IN | DIASTOLIC BLOOD PRESSURE: 67 MMHG | SYSTOLIC BLOOD PRESSURE: 112 MMHG | BODY MASS INDEX: 25.21 KG/M2

## 2021-12-06 DIAGNOSIS — G44.89 OTHER HEADACHE SYNDROME: ICD-10-CM

## 2021-12-06 DIAGNOSIS — G43.009 ATYPICAL MIGRAINE: Primary | ICD-10-CM

## 2021-12-06 DIAGNOSIS — G44.86 CERVICOGENIC HEADACHE: ICD-10-CM

## 2021-12-06 PROCEDURE — 99999 PR PBB SHADOW E&M-EST. PATIENT-LVL III: CPT | Mod: PBBFAC,,, | Performed by: PSYCHIATRY & NEUROLOGY

## 2021-12-06 PROCEDURE — 99244 OFF/OP CNSLTJ NEW/EST MOD 40: CPT | Mod: S$GLB,,, | Performed by: PSYCHIATRY & NEUROLOGY

## 2021-12-06 PROCEDURE — 99244 PR OFFICE CONSULTATION,LEVEL IV: ICD-10-PCS | Mod: S$GLB,,, | Performed by: PSYCHIATRY & NEUROLOGY

## 2021-12-06 PROCEDURE — 99999 PR PBB SHADOW E&M-EST. PATIENT-LVL III: ICD-10-PCS | Mod: PBBFAC,,, | Performed by: PSYCHIATRY & NEUROLOGY

## 2021-12-06 RX ORDER — DICLOFENAC SODIUM 10 MG/G
2 GEL TOPICAL 4 TIMES DAILY
Qty: 150 G | Refills: 0 | Status: SHIPPED | OUTPATIENT
Start: 2021-12-06

## 2021-12-07 ENCOUNTER — HOSPITAL ENCOUNTER (OUTPATIENT)
Dept: RADIOLOGY | Facility: HOSPITAL | Age: 61
Discharge: HOME | End: 2021-12-07
Attending: STUDENT IN AN ORGANIZED HEALTH CARE EDUCATION/TRAINING PROGRAM
Payer: COMMERCIAL

## 2021-12-07 DIAGNOSIS — G43.009 ATYPICAL MIGRAINE: ICD-10-CM

## 2021-12-07 DIAGNOSIS — G44.89 OTHER HEADACHE SYNDROME: ICD-10-CM

## 2021-12-07 DIAGNOSIS — G44.86 CERVICOGENIC HEADACHE: ICD-10-CM

## 2021-12-07 PROCEDURE — 72050 XR CERVICAL SPINE AP LAT WITH FLEX EXTEN: ICD-10-PCS | Mod: 26,,, | Performed by: RADIOLOGY

## 2021-12-07 PROCEDURE — 72050 X-RAY EXAM NECK SPINE 4/5VWS: CPT | Mod: TC

## 2021-12-07 PROCEDURE — 72050 X-RAY EXAM NECK SPINE 4/5VWS: CPT | Mod: 26,,, | Performed by: RADIOLOGY

## 2021-12-09 ENCOUNTER — PATIENT MESSAGE (OUTPATIENT)
Dept: NEUROLOGY | Facility: CLINIC | Age: 61
End: 2021-12-09
Payer: COMMERCIAL

## 2022-01-15 ENCOUNTER — PATIENT MESSAGE (OUTPATIENT)
Dept: SLEEP MEDICINE | Facility: CLINIC | Age: 62
End: 2022-01-15
Payer: COMMERCIAL

## 2022-01-18 ENCOUNTER — TELEPHONE (OUTPATIENT)
Dept: SLEEP MEDICINE | Facility: CLINIC | Age: 62
End: 2022-01-18
Payer: COMMERCIAL

## 2022-01-28 ENCOUNTER — IMMUNIZATION (OUTPATIENT)
Dept: INTERNAL MEDICINE | Facility: CLINIC | Age: 62
End: 2022-01-28
Payer: COMMERCIAL

## 2022-01-28 DIAGNOSIS — Z23 NEED FOR VACCINATION: Primary | ICD-10-CM

## 2022-01-28 PROCEDURE — 91300 COVID-19, MRNA, LNP-S, PF, 30 MCG/0.3 ML DOSE VACCINE: CPT | Mod: PBBFAC | Performed by: INTERNAL MEDICINE

## 2022-02-01 ENCOUNTER — PATIENT MESSAGE (OUTPATIENT)
Dept: ENDOSCOPY | Facility: HOSPITAL | Age: 62
End: 2022-02-01
Payer: COMMERCIAL

## 2022-02-01 ENCOUNTER — CLINICAL SUPPORT (OUTPATIENT)
Dept: OTHER | Facility: CLINIC | Age: 62
End: 2022-02-01

## 2022-02-01 DIAGNOSIS — Z00.8 ENCOUNTER FOR OTHER GENERAL EXAMINATION: ICD-10-CM

## 2022-02-02 VITALS — HEIGHT: 64 IN | BODY MASS INDEX: 25.35 KG/M2

## 2022-02-02 LAB
GLUCOSE SERPL-MCNC: 83 MG/DL (ref 60–140)
HDLC SERPL-MCNC: 70 MG/DL
POC CHOLESTEROL, LDL (DOCK): 137 MG/DL
POC CHOLESTEROL, TOTAL: 229 MG/DL
TRIGL SERPL-MCNC: 113 MG/DL

## 2022-02-04 ENCOUNTER — OFFICE VISIT (OUTPATIENT)
Dept: INTERNAL MEDICINE | Facility: CLINIC | Age: 62
End: 2022-02-04
Payer: COMMERCIAL

## 2022-02-04 VITALS
BODY MASS INDEX: 25.45 KG/M2 | DIASTOLIC BLOOD PRESSURE: 84 MMHG | WEIGHT: 149.06 LBS | HEART RATE: 74 BPM | OXYGEN SATURATION: 98 % | HEIGHT: 64 IN | SYSTOLIC BLOOD PRESSURE: 112 MMHG

## 2022-02-04 DIAGNOSIS — Z76.89 ENCOUNTER TO ESTABLISH CARE WITH NEW DOCTOR: Primary | ICD-10-CM

## 2022-02-04 DIAGNOSIS — Z91.89 FRAMINGHAM CARDIAC RISK <10% IN NEXT 10 YEARS: ICD-10-CM

## 2022-02-04 DIAGNOSIS — Z79.899 ENCOUNTER FOR LONG-TERM (CURRENT) USE OF OTHER MEDICATIONS: ICD-10-CM

## 2022-02-04 DIAGNOSIS — R74.01 ELEVATED TRANSAMINASE LEVEL: ICD-10-CM

## 2022-02-04 DIAGNOSIS — E78.2 MIXED HYPERLIPIDEMIA: ICD-10-CM

## 2022-02-04 DIAGNOSIS — E66.3 OVERWEIGHT (BMI 25.0-29.9): ICD-10-CM

## 2022-02-04 DIAGNOSIS — K76.0 NAFL (NONALCOHOLIC FATTY LIVER): ICD-10-CM

## 2022-02-04 DIAGNOSIS — Z98.890 STATUS POST GASTRIC SURGERY: ICD-10-CM

## 2022-02-04 PROCEDURE — 99214 PR OFFICE/OUTPT VISIT, EST, LEVL IV, 30-39 MIN: ICD-10-PCS | Mod: ,,, | Performed by: FAMILY MEDICINE

## 2022-02-04 PROCEDURE — 99214 OFFICE O/P EST MOD 30 MIN: CPT | Mod: ,,, | Performed by: FAMILY MEDICINE

## 2022-02-04 PROCEDURE — 99999 PR PBB SHADOW E&M-EST. PATIENT-LVL IV: ICD-10-PCS | Mod: PBBFAC,,, | Performed by: FAMILY MEDICINE

## 2022-02-04 PROCEDURE — 99999 PR PBB SHADOW E&M-EST. PATIENT-LVL IV: CPT | Mod: PBBFAC,,, | Performed by: FAMILY MEDICINE

## 2022-02-04 RX ORDER — EPINEPHRINE 0.22MG
100 AEROSOL WITH ADAPTER (ML) INHALATION DAILY
COMMUNITY

## 2022-02-04 RX ORDER — RIBOFLAVIN (VITAMIN B2) 100 MG
100 TABLET ORAL DAILY
COMMUNITY

## 2022-02-04 NOTE — PROGRESS NOTES
Subjective:       Patient ID: Ayah Ceron is a 61 y.o. female.    Chief Complaint: Establish Care    HPI    Ayah Ceron is a 61 y.o. female to est care. Prior PCP Dr. Ortiz.     Due for cscope - will consider after Nico gras    #Hep: Fatty liver  - was following with Dr. Vasquez, lv 2017  - elev lfts last labs -- consider f/u w/ hep in future    #Neuro: Atypical migraine  - est w/ Dr. Merida, lv 12/2021 -- f/u 3m    #VINOD on cpap  - est w/ NP Naun, lv 9/2021    #BMI 25: S/p Gastric bypass in 2011  - sees bariatric team ~q2yr  - Dr. Ortiz was ordering full panel of labs yearly    The 10-year ASCVD risk score ([place] ann LEY Jr., et al., 2013) is: 2.6%    Values used to calculate the score:      Age: 61 years      Sex: Female      Is Non- : No      Diabetic: No      Tobacco smoker: No      Systolic Blood Pressure: 112 mmHg      Is BP treated: No      HDL Cholesterol: 66 mg/dL      Total Cholesterol: 237 mg/dL    Review of Systems   Constitutional: Negative for chills and fever.   Respiratory: Negative for shortness of breath.    Cardiovascular: Negative for chest pain.   Gastrointestinal: Negative for abdominal pain.         Past Medical History:   Diagnosis Date    Anxiety     situational    Deviated septum     Fibroid     Hordeolum externum of right lower eyelid     Hyperlipemia     Liver disease     fatty liver    PONV (postoperative nausea and vomiting)     Sleep apnea     cpap pressure 8    Steatosis         Prior to Admission medications    Medication Sig Start Date End Date Taking? Authorizing Provider   biotin 1 mg tablet Take 1,000 mcg by mouth 3 (three) times daily.    Historical Provider   calcium citrate-vitamin D3 500 mg calcium -400 unit Chew Take by mouth. 2 tablets once per day    Historical Provider   conjugated estrogens (PREMARIN) vaginal cream Place 0.5 g vaginally twice a week. 12/6/18   Clarice Gutierrez MD   cyanocobalamin 500 MCG tablet Take 500 mcg by mouth once  "daily. sublingual    Historical Provider   desloratadine (CLARINEX) 5 mg tablet Take 1 tablet (5 mg total) by mouth once daily. 12/2/21   Elena Ortiz MD   diclofenac sodium (VOLTAREN) 1 % Gel Apply 2 g topically 4 (four) times daily. Apply to affected area 12/6/21   Bright Real MD   docusate sodium (COLACE) 100 MG capsule Take 100 mg by mouth 2 (two) times daily.    Historical Provider   doxycycline monohydrate 100 mg Tab Take 1 tablet (100 mg total) by mouth daily  Patient not taking: Reported on 11/1/2021 7/7/20   Neda C. DHEERAJ Clark   FERROUS FUMARATE/VIT BCOMP&C (SUPER B COMPLEX ORAL) Take by mouth.    Historical Provider   ivermectin (SOOLANTRA) 1 % Crea Apply to affected area of face every night and wash off in morning 7/9/20   Neda C. DHEERAJ Clark   ivermectin (STROMECTOL) 3 mg Tab Take 6 pills together then repeat in 2 weeks  Patient not taking: Reported on 11/1/2021 7/7/20   Neda C. DHEERAJ Clark   MAGNESIUM OXIDE (MAG-OXIDE ORAL) Take by mouth. Try to get 400-500 a day    Historical Provider   metronidazole 1% (METROGEL) 1 % Gel Apply topically once daily. 11/2/20 11/2/21  Elena Ortiz MD   multivitamin capsule Take 1 capsule by mouth 2 (two) times daily.    Historical Provider   sulfacetamide sodium-sulfur (SULFACLEANSE 8-4) 8-4 % Susp Wash face qd - bid 7/7/20   Neda C. DHEERAJ Clark        Past medical history, surgical history, and family medical history reviewed and updated as appropriate.    Medications and allergies reviewed.     Objective:          Vitals:    02/04/22 1524   BP: 112/84   BP Location: Right arm   Patient Position: Sitting   BP Method: Medium (Manual)   Pulse: 74   SpO2: 98%   Weight: 67.6 kg (149 lb 0.5 oz)   Height: 5' 4" (1.626 m)     Body mass index is 25.58 kg/m².  Physical Exam  Vitals and nursing note reviewed.   Constitutional:       General: She is not in acute distress.     Appearance: Normal appearance. She is not ill-appearing, toxic-appearing or " diaphoretic.   Cardiovascular:      Rate and Rhythm: Normal rate and regular rhythm.      Pulses: Normal pulses.      Heart sounds: Normal heart sounds. No murmur heard.      Pulmonary:      Effort: Pulmonary effort is normal. No respiratory distress.   Neurological:      Mental Status: She is alert and oriented to person, place, and time.   Psychiatric:         Mood and Affect: Mood normal.         Behavior: Behavior normal.         Lab Results   Component Value Date    WBC 3.84 (L) 11/01/2021    HGB 14.8 11/01/2021    HCT 45.3 11/01/2021     11/01/2021    CHOL 237 (H) 11/01/2021    TRIG 97 11/01/2021    HDL 66 11/01/2021    ALT 80 (H) 11/01/2021    AST 44 (H) 11/01/2021     11/01/2021    K 3.9 11/01/2021     11/01/2021    CREATININE 0.7 11/01/2021    BUN 13 11/01/2021    CO2 28 11/01/2021    TSH 2.677 11/01/2021    INR 1.1 10/17/2012    HGBA1C 5.3 10/13/2015       Assessment:       1. Encounter to establish care with new doctor    2. Encounter for long-term (current) use of other medications    3. Status post gastric surgery    4. NAFL (nonalcoholic fatty liver)    5. Elevated transaminase level    6. Overweight (BMI 25.0-29.9)    7. Mixed hyperlipidemia    8. Hull cardiac risk <10% in next 10 years          Plan:     Problem List Items Addressed This Visit        Cardiac/Vascular    Mixed hyperlipidemia    [place] cardiac risk <10% in next 10 years       Endocrine    Overweight (BMI 25.0-29.9)       GI    Status post gastric surgery    Overview     2011         NAFL (nonalcoholic fatty liver)    Overview     lov w/ hepatology in 2017         Elevated transaminase level      Other Visit Diagnoses     Encounter to establish care with new doctor    -  Primary    Encounter for long-term (current) use of other medications              Health maintenance reviewed with patient.     Follow up in about 9 months (around 11/4/2022) for Annual.    Cruz Renteria MD  Family Medicine / Primary  Care [facility]  2/4/2022

## 2022-03-11 ENCOUNTER — PATIENT OUTREACH (OUTPATIENT)
Dept: ADMINISTRATIVE | Facility: OTHER | Age: 62
End: 2022-03-11
Payer: COMMERCIAL

## 2022-03-11 NOTE — PROGRESS NOTES
Health Maintenance Due   Topic Date Due    Colorectal Cancer Screening  08/14/2020     Updates were requested from care everywhere.  Chart was reviewed for overdue Proactive [facility] Encounters ([facility]) topics (CRS, Breast Cancer Screening, Eye exam)  Health Maintenance has been updated.  LINKS immunization registry triggered.  Immunizations were reconciled.  Case Request Endoscopy: COLONOSCOPYOrdered 11/1/2021

## 2022-03-14 ENCOUNTER — OFFICE VISIT (OUTPATIENT)
Dept: NEUROLOGY | Facility: CLINIC | Age: 62
End: 2022-03-14
Payer: COMMERCIAL

## 2022-03-14 VITALS
WEIGHT: 151.88 LBS | SYSTOLIC BLOOD PRESSURE: 109 MMHG | HEART RATE: 72 BPM | HEIGHT: 64 IN | BODY MASS INDEX: 25.93 KG/M2 | DIASTOLIC BLOOD PRESSURE: 66 MMHG

## 2022-03-14 DIAGNOSIS — G44.86 CERVICOGENIC HEADACHE: ICD-10-CM

## 2022-03-14 DIAGNOSIS — G44.209 TENSION HEADACHE: Primary | ICD-10-CM

## 2022-03-14 PROCEDURE — 1159F PR MEDICATION LIST DOCUMENTED IN MEDICAL RECORD: ICD-10-PCS | Mod: CPTII,S$GLB,, | Performed by: PSYCHIATRY & NEUROLOGY

## 2022-03-14 PROCEDURE — 3008F PR BODY MASS INDEX (BMI) DOCUMENTED: ICD-10-PCS | Mod: CPTII,S$GLB,, | Performed by: PSYCHIATRY & NEUROLOGY

## 2022-03-14 PROCEDURE — 1160F RVW MEDS BY RX/DR IN RCRD: CPT | Mod: CPTII,S$GLB,, | Performed by: PSYCHIATRY & NEUROLOGY

## 2022-03-14 PROCEDURE — 3074F SYST BP LT 130 MM HG: CPT | Mod: CPTII,S$GLB,, | Performed by: PSYCHIATRY & NEUROLOGY

## 2022-03-14 PROCEDURE — 1160F PR REVIEW ALL MEDS BY PRESCRIBER/CLIN PHARMACIST DOCUMENTED: ICD-10-PCS | Mod: CPTII,S$GLB,, | Performed by: PSYCHIATRY & NEUROLOGY

## 2022-03-14 PROCEDURE — 99214 OFFICE O/P EST MOD 30 MIN: CPT | Mod: S$GLB,,, | Performed by: PSYCHIATRY & NEUROLOGY

## 2022-03-14 PROCEDURE — 3008F BODY MASS INDEX DOCD: CPT | Mod: CPTII,S$GLB,, | Performed by: PSYCHIATRY & NEUROLOGY

## 2022-03-14 PROCEDURE — 99999 PR PBB SHADOW E&M-EST. PATIENT-LVL IV: ICD-10-PCS | Mod: PBBFAC,,, | Performed by: PSYCHIATRY & NEUROLOGY

## 2022-03-14 PROCEDURE — 99999 PR PBB SHADOW E&M-EST. PATIENT-LVL IV: CPT | Mod: PBBFAC,,, | Performed by: PSYCHIATRY & NEUROLOGY

## 2022-03-14 PROCEDURE — 99214 PR OFFICE/OUTPT VISIT, EST, LEVL IV, 30-39 MIN: ICD-10-PCS | Mod: S$GLB,,, | Performed by: PSYCHIATRY & NEUROLOGY

## 2022-03-14 PROCEDURE — 3078F PR MOST RECENT DIASTOLIC BLOOD PRESSURE < 80 MM HG: ICD-10-PCS | Mod: CPTII,S$GLB,, | Performed by: PSYCHIATRY & NEUROLOGY

## 2022-03-14 PROCEDURE — 3078F DIAST BP <80 MM HG: CPT | Mod: CPTII,S$GLB,, | Performed by: PSYCHIATRY & NEUROLOGY

## 2022-03-14 PROCEDURE — 3074F PR MOST RECENT SYSTOLIC BLOOD PRESSURE < 130 MM HG: ICD-10-PCS | Mod: CPTII,S$GLB,, | Performed by: PSYCHIATRY & NEUROLOGY

## 2022-03-14 PROCEDURE — 1159F MED LIST DOCD IN RCRD: CPT | Mod: CPTII,S$GLB,, | Performed by: PSYCHIATRY & NEUROLOGY

## 2022-03-14 NOTE — PROGRESS NOTES
Subjective:       Patient ID: Ayah Ceron is a 61 y.o. female.    Reason for Consult: Follow-up      Interval History:  Ayah Ceron is here for follow up. Their condition is relatively stable.  The patient had 1 day of headache in December that lasted about 3 hours, 1 do headache in January that lasted about 2 hours.  She had 3 days of headache in February the longest of which lasted 8 hours.  The patient notes that she would frequently taken over-the-counter Aleve and then use the topical diclofenac gel.  She notes that she did not try the topical diclofenac gel alone nor did she bring it to work, when her headache started.  She notes that she is using blue light glasses and that the leads does not completely take her headache away but it does help.  She notes that she is not having or with headaches.  She notes that she does not feel neck pain.  We have reviewed her x-rays on today's visit.      Objective:     Vitals:    03/14/22 1434   BP: 109/66   Pulse: 72     Patient is awake alert oriented to person place and time.  Moves all 4 extremities against gravity.  Gait and station within normal limits.  Cranial nerves 2-12 were without focal deficits.  Focused examination was undertaken today. Most of the visit time was spent giving guidance, counseling and discussing treatment options.    Assessment/Plan:     Problem List Items Addressed This Visit    None     Visit Diagnoses     Tension headache    -  Primary    Cervicogenic headache            61-year-old female presents for evaluation of tension-type headaches.  At this time we have discussed multiple options for her.  The patient defers physical therapy and a prescription anti-inflammatory medication.  For now the patient prefers to do a home exercise program, of which I printed her out 1. I have also placed on the patient portal.  I have discussed with the patient that she may take an over-the-counter medication judiciously throughout the week as needed  for her headaches.  She is having less than 6 days of headache per month so for now she can use rescue medication.  I would like her to try to do the neck exercises daily for at least 6 months.  I will have her come back and we will see what the next step would be for her treatment.    The patient verbalizes understanding and agreement with the treatment plan. I have discussed risks, benefits and alternatives to the treatment plan. Questions were sought and answered to her stated verbal satisfaction.        Calista Merida MD    This note is dictated on M*Modal Fluency Direct word recognition program. There are word recognition mistakes that are occasionally missed on review.

## 2022-05-16 ENCOUNTER — PATIENT MESSAGE (OUTPATIENT)
Dept: ENDOSCOPY | Facility: HOSPITAL | Age: 62
End: 2022-05-16
Payer: COMMERCIAL

## 2022-05-16 DIAGNOSIS — Z12.11 SCREEN FOR COLON CANCER: Primary | ICD-10-CM

## 2022-05-16 DIAGNOSIS — Z12.11 COLON CANCER SCREENING: Primary | ICD-10-CM

## 2022-05-16 RX ORDER — SODIUM, POTASSIUM,MAG SULFATES 17.5-3.13G
1 SOLUTION, RECONSTITUTED, ORAL ORAL DAILY
Qty: 1 KIT | Refills: 0 | Status: SHIPPED | OUTPATIENT
Start: 2022-05-16 | End: 2022-05-20

## 2022-07-19 ENCOUNTER — OFFICE VISIT (OUTPATIENT)
Dept: OPTOMETRY | Facility: CLINIC | Age: 62
End: 2022-07-19
Payer: COMMERCIAL

## 2022-07-19 DIAGNOSIS — H52.4 PRESBYOPIA: ICD-10-CM

## 2022-07-19 DIAGNOSIS — Z01.00 EXAMINATION OF EYES AND VISION: Primary | ICD-10-CM

## 2022-07-19 PROCEDURE — 99999 PR PBB SHADOW E&M-EST. PATIENT-LVL III: CPT | Mod: PBBFAC,,, | Performed by: OPTOMETRIST

## 2022-07-19 PROCEDURE — 99999 PR PBB SHADOW E&M-EST. PATIENT-LVL III: ICD-10-PCS | Mod: PBBFAC,,, | Performed by: OPTOMETRIST

## 2022-07-19 PROCEDURE — 92014 COMPRE OPH EXAM EST PT 1/>: CPT | Mod: S$GLB,,, | Performed by: OPTOMETRIST

## 2022-07-19 PROCEDURE — 92014 PR EYE EXAM, EST PATIENT,COMPREHESV: ICD-10-PCS | Mod: S$GLB,,, | Performed by: OPTOMETRIST

## 2022-07-19 NOTE — PROGRESS NOTES
HPI     Last eye exam was 3/8/21 with Dr. Suarez.  Patient states no vision changes since last exam. Has headaches but is   followed by Neurology.  Patient denies diplopia, flashes/floaters, and pain.      Last edited by Wendy Castro MA on 7/19/2022  3:29 PM. (History)            Assessment /Plan     For exam results, see Encounter Report.    Examination of eyes and vision    Presbyopia            1-2.  No rx given--s/p LASIK.   Retina flat and intact OU--no holes, tears, breaks, or RDs.    Eye health normal OU.   RTC 1 year for routine exam.

## 2022-07-25 ENCOUNTER — HOSPITAL ENCOUNTER (OUTPATIENT)
Facility: HOSPITAL | Age: 62
Discharge: HOME | End: 2022-07-25
Attending: INTERNAL MEDICINE | Admitting: INTERNAL MEDICINE
Payer: COMMERCIAL

## 2022-07-25 ENCOUNTER — ANESTHESIA EVENT (OUTPATIENT)
Dept: ENDOSCOPY | Facility: HOSPITAL | Age: 62
End: 2022-07-25
Payer: COMMERCIAL

## 2022-07-25 ENCOUNTER — ANESTHESIA (OUTPATIENT)
Dept: ENDOSCOPY | Facility: HOSPITAL | Age: 62
End: 2022-07-25
Payer: COMMERCIAL

## 2022-07-25 VITALS
SYSTOLIC BLOOD PRESSURE: 128 MMHG | OXYGEN SATURATION: 99 % | BODY MASS INDEX: 24.75 KG/M2 | DIASTOLIC BLOOD PRESSURE: 73 MMHG | HEIGHT: 64 IN | RESPIRATION RATE: 16 BRPM | TEMPERATURE: 98 F | WEIGHT: 145 LBS | HEART RATE: 60 BPM

## 2022-07-25 DIAGNOSIS — Z12.11 SCREEN FOR COLON CANCER: ICD-10-CM

## 2022-07-25 DIAGNOSIS — R07.9 CHEST PAIN: ICD-10-CM

## 2022-07-25 PROCEDURE — 93005 ELECTROCARDIOGRAM TRACING: CPT

## 2022-07-25 PROCEDURE — G0121 COLON CA SCRN NOT HI RSK IND: HCPCS | Performed by: INTERNAL MEDICINE

## 2022-07-25 PROCEDURE — 25000003 PHARM REV CODE 250: Performed by: INTERNAL MEDICINE

## 2022-07-25 PROCEDURE — E9220 PRA ENDO ANESTHESIA: ICD-10-PCS | Mod: ,,, | Performed by: NURSE ANESTHETIST, CERTIFIED REGISTERED

## 2022-07-25 PROCEDURE — 37000008 HC ANESTHESIA 1ST 15 MINUTES: Performed by: INTERNAL MEDICINE

## 2022-07-25 PROCEDURE — 37000009 HC ANESTHESIA EA ADD 15 MINS: Performed by: INTERNAL MEDICINE

## 2022-07-25 PROCEDURE — 63600175 PHARM REV CODE 636 W HCPCS: Performed by: NURSE ANESTHETIST, CERTIFIED REGISTERED

## 2022-07-25 PROCEDURE — G0121 COLON CA SCRN NOT HI RSK IND: ICD-10-PCS | Mod: ,,, | Performed by: INTERNAL MEDICINE

## 2022-07-25 PROCEDURE — G0121 COLON CA SCRN NOT HI RSK IND: HCPCS | Mod: ,,, | Performed by: INTERNAL MEDICINE

## 2022-07-25 PROCEDURE — 93010 ELECTROCARDIOGRAM REPORT: CPT | Mod: ,,, | Performed by: INTERNAL MEDICINE

## 2022-07-25 PROCEDURE — E9220 PRA ENDO ANESTHESIA: HCPCS | Mod: ,,, | Performed by: NURSE ANESTHETIST, CERTIFIED REGISTERED

## 2022-07-25 PROCEDURE — 93010 EKG 12-LEAD: ICD-10-PCS | Mod: ,,, | Performed by: INTERNAL MEDICINE

## 2022-07-25 RX ORDER — LIDOCAINE HCL/PF 100 MG/5ML
SYRINGE (ML) INTRAVENOUS
Status: DISCONTINUED | OUTPATIENT
Start: 2022-07-25 | End: 2022-07-25

## 2022-07-25 RX ORDER — PROPOFOL 10 MG/ML
VIAL (ML) INTRAVENOUS
Status: DISCONTINUED | OUTPATIENT
Start: 2022-07-25 | End: 2022-07-25

## 2022-07-25 RX ORDER — SODIUM CHLORIDE 9 MG/ML
INJECTION, SOLUTION INTRAVENOUS CONTINUOUS
Status: DISCONTINUED | OUTPATIENT
Start: 2022-07-25 | End: 2022-07-25 | Stop reason: HOSPADM

## 2022-07-25 RX ORDER — PROPOFOL 10 MG/ML
VIAL (ML) INTRAVENOUS CONTINUOUS PRN
Status: DISCONTINUED | OUTPATIENT
Start: 2022-07-25 | End: 2022-07-25

## 2022-07-25 RX ADMIN — Medication 50 MG: at 07:07

## 2022-07-25 RX ADMIN — SODIUM CHLORIDE: 0.9 INJECTION, SOLUTION INTRAVENOUS at 07:07

## 2022-07-25 RX ADMIN — PROPOFOL 150 MCG/KG/MIN: 10 INJECTION, EMULSION INTRAVENOUS at 07:07

## 2022-07-25 RX ADMIN — PROPOFOL 50 MG: 10 INJECTION, EMULSION INTRAVENOUS at 07:07

## 2022-07-25 NOTE — ANESTHESIA PREPROCEDURE EVALUATION
07/25/2022  Ayah Ceron is a 61 y.o., female.  Past Medical History:   Diagnosis Date    Anxiety     situational    Deviated septum     Fibroid     Hordeolum externum of right lower eyelid     Hyperlipemia     Liver disease     fatty liver    PONV (postoperative nausea and vomiting)     Sleep apnea     cpap pressure 8    Steatosis        Past Surgical History:   Procedure Laterality Date    CARPAL TUNNEL RELEASE      DILATION AND CURETTAGE OF UTERUS      GASTRIC BYPASS  04/25/2011    HEMORRHOID SURGERY      HYSTERECTOMY      TVH BSO    LASIK      10/26/03    TONSILLECTOMY, ADENOIDECTOMY      TUBAL LIGATION      WISDOM TOOTH EXTRACTION         Family History   Problem Relation Age of Onset    Hypothyroidism Mother         had thyroidectomy    Hypertension Father     Arthritis Father     Hyperlipidemia Father     Eczema Daughter     Acne Daughter     Acne Son     Breast cancer Paternal Aunt     Breast cancer Paternal Grandmother     Psoriasis Neg Hx     Lupus Neg Hx     Ovarian cancer Neg Hx     Cervical cancer Neg Hx     Endometrial cancer Neg Hx     Vaginal cancer Neg Hx        Social History     Socioeconomic History    Marital status:    Occupational History    Occupation: RN     Employer: [employer]   Tobacco Use    Smoking status: Never Smoker    Smokeless tobacco: Never Used   Substance and Sexual Activity    Alcohol use: No    Drug use: No    Sexual activity: Yes     Partners: Male     Birth control/protection: Surgical   Other Topics Concern    Are you pregnant or think you may be? No    Breast-feeding No       Current Facility-Administered Medications   Medication Dose Route Frequency Provider Last Rate Last Admin    0.9%  NaCl infusion   Intravenous Continuous Cruz Stahl MD 10 mL/hr at 07/25/22 0709 New Bag at 07/25/22 0709        Review of patient's allergies indicates:   Allergen Reactions    Augmentin [amoxicillin-pot clavulanate]      Abdominal cramping           Pre-op Assessment    I have reviewed the Patient Summary Reports.     I have reviewed the Nursing Notes. I have reviewed the NPO Status.   I have reviewed the Medications.     Review of Systems  Anesthesia Hx:  Hx of Anesthetic complications  History of prior surgery of interest to airway management or planning: gastric bypass. Previous anesthesia: General Vaginal repair 2015 with general anesthesia.  Procedure performed at an [facility]. Airway issues documented on chart review include mask, easy, GETA, easy direct laryngoscopy , view on direct laryngoscopy Grade I  Denies Family Hx of Anesthesia complications.  Personal Hx of Anesthesia complications, Post-Operative Nausea/Vomiting, in the past, but not with recent anesthetics / prophylaxis   Social:  Non-Smoker, No Alcohol Use    Hematology/Oncology:  Hematology Normal   Oncology Normal     EENT/Dental:EENT/Dental Normal   Cardiovascular:   Exercise tolerance: good hyperlipidemia ECG has been reviewed.    Pulmonary:   Sleep Apnea    Renal/:  Renal/ Normal     Hepatic/GI:   Liver Disease, Steatosis  Nonalcoholic fatty liver   Musculoskeletal:  Musculoskeletal Normal    Neurological:  Neurology Normal    Endocrine:  Endocrine Normal    Dermatological:  Skin Normal    Psych:   anxiety          Physical Exam  General: Well nourished, Cooperative, Alert and Oriented    Airway:  Mallampati: II / II  Mouth Opening: Normal  TM Distance: Normal  Tongue: Normal  Neck ROM: Normal ROM    Dental:  Intact        Anesthesia Plan  Type of Anesthesia, risks & benefits discussed:    Anesthesia Type: Gen Natural Airway  Intra-op Monitoring Plan: Standard ASA Monitors  Post Op Pain Control Plan: multimodal analgesia and IV/PO Opioids PRN  Induction:  IV  Informed Consent: Informed consent signed with the Patient and all parties  understand the risks and agree with anesthesia plan.  All questions answered.   ASA Score: 2  Day of Surgery Review of History & Physical: H&P Update referred to the surgeon/provider.    Ready For Surgery From Anesthesia Perspective.     .

## 2022-07-25 NOTE — PLAN OF CARE
Prior to discharge c/o ongoing chest and abdominal discomfort 4/10 pain. MD notified and assessed pt. EKG ordered.

## 2022-07-25 NOTE — ANESTHESIA POSTPROCEDURE EVALUATION
Anesthesia Post Evaluation    Patient: Ayah Ceron    Procedure(s) Performed: Procedure(s) (LRB):  COLONOSCOPY (N/A)    Final Anesthesia Type: general      Patient location during evaluation: GI PACU  Patient participation: Yes- Able to Participate  Level of consciousness: awake and alert, awake and oriented  Post-procedure vital signs: reviewed and stable  Pain management: adequate  Airway patency: patent    PONV status at discharge: No PONV  Anesthetic complications: no      Cardiovascular status: blood pressure returned to baseline, stable and hemodynamically stable  Respiratory status: unassisted, spontaneous ventilation and room air  Hydration status: euvolemic  Follow-up not needed.          Vitals Value Taken Time   BP 95/59 07/25/22 0901   Temp 36.6 °C (97.9 °F) 07/25/22 0821   Pulse 78 07/25/22 0901   Resp 16 07/25/22 0901   SpO2 98 % 07/25/22 0901         No case tracking events are documented in the log.      Pain/Kolby Score: Kolby Score: 9 (7/25/2022  9:03 AM)

## 2022-07-25 NOTE — PROVATION PATIENT INSTRUCTIONS
Discharge Summary/Instructions after an Endoscopic Procedure  Patient Name: Ayah Ceron  Patient MRN: [id number]  Patient YOB: 1960 Monday, July 25, 2022  Cruz Stahl MD  Dear patient,  As a result of recent federal legislation (The Federal Cures Act), you may   receive lab or pathology results from your procedure in your [facility]   account before your physician is able to contact you. Your physician or   their representative will relay the results to you with their   recommendations at their soonest availability.  Thank you,  RESTRICTIONS:  During your procedure today, you received medications for sedation.  These   medications may affect your judgment, balance and coordination.  Therefore,   for 24 hours, you have the following restrictions:   - DO NOT drive a car, operate machinery, make legal/financial decisions,   sign important papers or drink alcohol.    ACTIVITY:  Today: no heavy lifting, straining or running due to procedural   sedation/anesthesia.  The following day: return to full activity including work.  DIET:  Eat and drink normally unless instructed otherwise.     TREATMENT FOR COMMON SIDE EFFECTS:  - Mild abdominal pain, nausea, belching, bloating or excessive gas:  rest,   eat lightly and use a heating pad.  - Sore Throat: treat with throat lozenges and/or gargle with warm salt   water.  - Because air was used during the procedure, expelling large amounts of air   from your rectum or belching is normal.  - If a bowel prep was taken, you may not have a bowel movement for 1-3 days.    This is normal.  SYMPTOMS TO WATCH FOR AND REPORT TO YOUR PHYSICIAN:  1. Abdominal pain or bloating, other than gas cramps.  2. Chest pain.  3. Back pain.  4. Signs of infection such as: chills or fever occurring within 24 hours   after the procedure.  5. Rectal bleeding, which would show as bright red, maroon, or black stools.   (A tablespoon of blood from the rectum is not serious, especially if    hemorrhoids are present.)  6. Vomiting.  7. Weakness or dizziness.  GO DIRECTLY TO THE NEAREST EMERGENCY ROOM IF YOU HAVE ANY OF THE FOLLOWING:      Difficulty breathing              Chills and/or fever over 101 F   Persistent vomiting and/or vomiting blood   Severe abdominal pain   Severe chest pain   Black, tarry stools   Bleeding- more than one tablespoon   Any other symptom or condition that you feel may need urgent attention  Your doctor recommends these additional instructions:  If any biopsies were taken, your doctors clinic will contact you in 1 to 2   weeks with any results.  - Patient has a contact number available for emergencies.  The signs and   symptoms of potential delayed complications were discussed with the   patient.  Return to normal activities tomorrow.  Written discharge   instructions were provided to the patient.   - Resume previous diet.   - Continue present medications.   - Repeat colonoscopy in 10 years for screening purposes.   - Discharge patient to home.  For questions, problems or results please call your physician - Cruz Stahl MD at Work:  [phone].  [facility], EMERGENCY ROOM PHONE NUMBER: [phone]  IF A COMPLICATION OR EMERGENCY SITUATION ARISES AND YOU ARE UNABLE TO REACH   YOUR PHYSICIAN - GO DIRECTLY TO THE EMERGENCY ROOM.  Cruz Stahl MD  7/25/2022 8:18:02 AM  This report has been verified and signed electronically.  Dear patient,  As a result of recent federal legislation (The Federal Cures Act), you may   receive lab or pathology results from your procedure in your [facility]   account before your physician is able to contact you. Your physician or   their representative will relay the results to you with their   recommendations at their soonest availability.  Thank you,  PROVATION

## 2022-07-25 NOTE — PLAN OF CARE
Procedure completed. Pt to be discharged with sig other. In no acute distress. Discharge instructions given. Verbalizes understanding of post procedure restrictions and instructions.

## 2022-07-25 NOTE — TRANSFER OF CARE
"Anesthesia Transfer of Care Note    Patient: Ayah Ceron    Procedure(s) Performed: Procedure(s) (LRB):  COLONOSCOPY (N/A)    Patient location: GI    Anesthesia Type: general    Transport from OR: Transported from OR on room air with adequate spontaneous ventilation    Post pain: adequate analgesia    Post assessment: no apparent anesthetic complications and tolerated procedure well    Post vital signs: stable    Level of consciousness: lethargic and responds to stimulation    Nausea/Vomiting: no nausea/vomiting    Complications: none    Transfer of care protocol was followed      Last vitals:   Visit Vitals  /70 (BP Location: Left arm, Patient Position: Lying)   Pulse 74   Temp 36.6 °C (97.9 °F) (Tympanic)   Resp 16   Ht 5' 4" (1.626 m)   Wt 65.8 kg (145 lb)   SpO2 98%   Breastfeeding No   BMI 24.89 kg/m²     "
report endorsed to oncoming RN. awaiting radiology

## 2022-07-25 NOTE — H&P
Short Stay Endoscopy   Pre-Procedure Note    PCP - Cruz Renteria MD  Referring Physician - Cruz Renteria MD  [address] [place] [address]    Procedure - Endoscopy    ASA - per anesthesia  Mallampati - per anesthesia    HPI  61 y.o. female scheduled for endoscopy for evaluation of    1. Screen for colon cancer         Medical History:  has a past medical history of Anxiety, Deviated septum, Fibroid, Hordeolum externum of right lower eyelid, Hyperlipemia, Liver disease, PONV (postoperative nausea and vomiting), Sleep apnea, and Steatosis.    Surgical History:  has a past surgical history that includes Dilation and curettage of uterus; Gastric bypass (04/25/2011); Hemorrhoid surgery; TONSILLECTOMY, ADENOIDECTOMY; Tubal ligation; [place] tooth extraction; Carpal tunnel release; LASIK; and Hysterectomy.    Family History: family history includes Acne in her daughter and son; Arthritis in her father; Breast cancer in her paternal aunt and paternal grandmother; Eczema in her daughter; Hyperlipidemia in her father; Hypertension in her father; Hypothyroidism in her mother..    Social History:  reports that she has never smoked. She has never used smokeless tobacco. She reports that she does not drink alcohol and does not use drugs.    Review of patient's allergies indicates:   Allergen Reactions    Augmentin [amoxicillin-pot clavulanate]      Abdominal cramping       Medications:   Medications Prior to Admission   Medication Sig Dispense Refill Last Dose    biotin 1 mg tablet Take 1,000 mcg by mouth 3 (three) times daily.       calcium citrate-vitamin D3 500 mg calcium -400 unit Chew Take by mouth. 2 tablets once per day       coenzyme Q10 100 mg capsule Take 100 mg by mouth once daily.       conjugated estrogens (PREMARIN) vaginal cream Place 0.5 g vaginally twice a week. 30 g 11     cyanocobalamin 500 MCG tablet Take 500 mcg by mouth once daily. sublingual       desloratadine (CLARINEX) 5 mg  tablet Take 1 tablet (5 mg total) by mouth once daily. 90 tablet 3     diclofenac sodium (VOLTAREN) 1 % Gel Apply 2 g topically 4 (four) times daily. Apply to affected area 150 g 0     docusate sodium (COLACE) 100 MG capsule Take 100 mg by mouth 2 (two) times daily.       doxycycline monohydrate 100 mg Tab Take 1 tablet (100 mg total) by mouth daily (Patient taking differently: Take 1 tablet (100 mg total) by mouth daily) 30 tablet 2     FERROUS FUMARATE/VIT BCOMP&C (SUPER B COMPLEX ORAL) Take by mouth.       ivermectin (SOOLANTRA) 1 % Crea Apply to affected area of face every night and wash off in morning 45 g 3     ivermectin (STROMECTOL) 3 mg Tab Take 6 pills together then repeat in 2 weeks 12 tablet 0     MAGNESIUM OXIDE (MAG-OXIDE ORAL) Take by mouth. Try to get 400-500 a day       metronidazole 1% (METROGEL) 1 % Gel Apply topically once daily. 60 g 1     multivitamin capsule Take 1 capsule by mouth 2 (two) times daily.       riboflavin, vitamin B2, (VITAMIN B-2) 100 mg Tab tablet Take 100 mg by mouth once daily.       sulfacetamide sodium-sulfur (SULFACLEANSE 8-4) 8-4 % Susp Wash face qd - bid 1 Bottle 5          Labs:  Lab Results   Component Value Date    WBC 3.84 (L) 11/01/2021    HGB 14.8 11/01/2021    HCT 45.3 11/01/2021     11/01/2021    CHOL 237 (H) 11/01/2021    TRIG 97 11/01/2021    HDL 66 11/01/2021    ALT 80 (H) 11/01/2021    AST 44 (H) 11/01/2021     11/01/2021    K 3.9 11/01/2021     11/01/2021    CREATININE 0.7 11/01/2021    BUN 13 11/01/2021    CO2 28 11/01/2021    TSH 2.677 11/01/2021    INR 1.1 10/17/2012    HGBA1C 5.3 10/13/2015       Risks and benefits of this endoscopic procedure, including but not limited to bleeding, inflammation, infection, perforation, and death, explained to the patient prior to procedure      Cruz Stahl MD

## 2022-09-12 ENCOUNTER — TELEPHONE (OUTPATIENT)
Dept: INTERNAL MEDICINE | Facility: CLINIC | Age: 62
End: 2022-09-12
Payer: COMMERCIAL

## 2022-09-12 DIAGNOSIS — Z00.00 ANNUAL PHYSICAL EXAM: Primary | ICD-10-CM

## 2022-09-12 DIAGNOSIS — Z12.31 ENCOUNTER FOR SCREENING MAMMOGRAM FOR BREAST CANCER: ICD-10-CM

## 2022-09-12 NOTE — TELEPHONE ENCOUNTER
----- Message from Mandy Baltazar sent at 9/12/2022  9:27 AM CDT -----  Contact: [phone]  Pt called to make her annual appointment which she made for 11/2/22. She would also like to get the orders for her annual labs and mammogram order. Please Advise

## 2022-10-25 ENCOUNTER — LAB VISIT (OUTPATIENT)
Dept: LAB | Facility: HOSPITAL | Age: 62
End: 2022-10-25
Attending: FAMILY MEDICINE
Payer: COMMERCIAL

## 2022-10-25 DIAGNOSIS — Z00.00 ANNUAL PHYSICAL EXAM: ICD-10-CM

## 2022-10-25 LAB
ALBUMIN SERPL BCP-MCNC: 4 G/DL (ref 3.5–5.2)
ALP SERPL-CCNC: 103 U/L (ref 55–135)
ALT SERPL W/O P-5'-P-CCNC: 82 U/L (ref 10–44)
ANION GAP SERPL CALC-SCNC: 7 MMOL/L (ref 8–16)
AST SERPL-CCNC: 46 U/L (ref 10–40)
BILIRUB SERPL-MCNC: 0.5 MG/DL (ref 0.1–1)
BUN SERPL-MCNC: 15 MG/DL (ref 6–20)
CALCIUM SERPL-MCNC: 9.3 MG/DL (ref 8.7–10.5)
CHLORIDE SERPL-SCNC: 104 MMOL/L (ref 95–110)
CHOLEST SERPL-MCNC: 233 MG/DL (ref 120–199)
CHOLEST/HDLC SERPL: 3.5 {RATIO} (ref 2–5)
CO2 SERPL-SCNC: 29 MMOL/L (ref 23–29)
CREAT SERPL-MCNC: 0.7 MG/DL (ref 0.5–1.4)
ERYTHROCYTE [DISTWIDTH] IN BLOOD BY AUTOMATED COUNT: 12.9 % (ref 11.5–14.5)
EST. GFR  (NO RACE VARIABLE): >60 ML/MIN/1.73 M^2
ESTIMATED AVG GLUCOSE: 103 MG/DL (ref 68–131)
FERRITIN SERPL-MCNC: 61 NG/ML (ref 20–300)
GLUCOSE SERPL-MCNC: 88 MG/DL (ref 70–110)
HBA1C MFR BLD: 5.2 % (ref 4–5.6)
HCT VFR BLD AUTO: 43.7 % (ref 37–48.5)
HDLC SERPL-MCNC: 67 MG/DL (ref 40–75)
HDLC SERPL: 28.8 % (ref 20–50)
HGB BLD-MCNC: 14.6 G/DL (ref 12–16)
IRON SERPL-MCNC: 101 UG/DL (ref 30–160)
LDLC SERPL CALC-MCNC: 150.2 MG/DL (ref 63–159)
MAGNESIUM SERPL-MCNC: 2.1 MG/DL (ref 1.6–2.6)
MCH RBC QN AUTO: 30.8 PG (ref 27–31)
MCHC RBC AUTO-ENTMCNC: 33.4 G/DL (ref 32–36)
MCV RBC AUTO: 92 FL (ref 82–98)
NONHDLC SERPL-MCNC: 166 MG/DL
PLATELET # BLD AUTO: 297 K/UL (ref 150–450)
PMV BLD AUTO: 10 FL (ref 9.2–12.9)
POTASSIUM SERPL-SCNC: 4 MMOL/L (ref 3.5–5.1)
PROT SERPL-MCNC: 6.5 G/DL (ref 6–8.4)
RBC # BLD AUTO: 4.74 M/UL (ref 4–5.4)
SATURATED IRON: 27 % (ref 20–50)
SODIUM SERPL-SCNC: 140 MMOL/L (ref 136–145)
TOTAL IRON BINDING CAPACITY: 377 UG/DL (ref 250–450)
TRANSFERRIN SERPL-MCNC: 255 MG/DL (ref 200–375)
TRIGL SERPL-MCNC: 79 MG/DL (ref 30–150)
TSH SERPL DL<=0.005 MIU/L-ACNC: 3.75 UIU/ML (ref 0.4–4)
VIT B12 SERPL-MCNC: 1131 PG/ML (ref 210–950)
WBC # BLD AUTO: 3.81 K/UL (ref 3.9–12.7)

## 2022-10-25 PROCEDURE — 82607 VITAMIN B-12: CPT | Performed by: FAMILY MEDICINE

## 2022-10-25 PROCEDURE — 84425 ASSAY OF VITAMIN B-1: CPT | Performed by: FAMILY MEDICINE

## 2022-10-25 PROCEDURE — 83036 HEMOGLOBIN GLYCOSYLATED A1C: CPT | Performed by: FAMILY MEDICINE

## 2022-10-25 PROCEDURE — 80053 COMPREHEN METABOLIC PANEL: CPT | Performed by: FAMILY MEDICINE

## 2022-10-25 PROCEDURE — 84443 ASSAY THYROID STIM HORMONE: CPT | Performed by: FAMILY MEDICINE

## 2022-10-25 PROCEDURE — 80061 LIPID PANEL: CPT | Performed by: FAMILY MEDICINE

## 2022-10-25 PROCEDURE — 84466 ASSAY OF TRANSFERRIN: CPT | Performed by: FAMILY MEDICINE

## 2022-10-25 PROCEDURE — 85027 COMPLETE CBC AUTOMATED: CPT | Performed by: FAMILY MEDICINE

## 2022-10-25 PROCEDURE — 83735 ASSAY OF MAGNESIUM: CPT | Performed by: FAMILY MEDICINE

## 2022-10-25 PROCEDURE — 36415 COLL VENOUS BLD VENIPUNCTURE: CPT | Performed by: FAMILY MEDICINE

## 2022-10-25 PROCEDURE — 82728 ASSAY OF FERRITIN: CPT | Performed by: FAMILY MEDICINE

## 2022-10-28 LAB — VIT B1 BLD-MCNC: 98 UG/L (ref 38–122)

## 2022-11-02 ENCOUNTER — OFFICE VISIT (OUTPATIENT)
Dept: INTERNAL MEDICINE | Facility: CLINIC | Age: 62
End: 2022-11-02
Payer: COMMERCIAL

## 2022-11-02 VITALS
OXYGEN SATURATION: 97 % | WEIGHT: 153.25 LBS | SYSTOLIC BLOOD PRESSURE: 128 MMHG | HEIGHT: 64 IN | DIASTOLIC BLOOD PRESSURE: 84 MMHG | HEART RATE: 71 BPM | BODY MASS INDEX: 26.16 KG/M2

## 2022-11-02 DIAGNOSIS — K76.0 NAFL (NONALCOHOLIC FATTY LIVER): ICD-10-CM

## 2022-11-02 DIAGNOSIS — Z00.00 ANNUAL PHYSICAL EXAM: Primary | ICD-10-CM

## 2022-11-02 PROCEDURE — 99999 PR PBB SHADOW E&M-EST. PATIENT-LVL IV: CPT | Mod: PBBFAC,,, | Performed by: FAMILY MEDICINE

## 2022-11-02 PROCEDURE — 3079F PR MOST RECENT DIASTOLIC BLOOD PRESSURE 80-89 MM HG: ICD-10-PCS | Mod: CPTII,S$GLB,, | Performed by: FAMILY MEDICINE

## 2022-11-02 PROCEDURE — 99396 PR PREVENTIVE VISIT,EST,40-64: ICD-10-PCS | Mod: S$GLB,,, | Performed by: FAMILY MEDICINE

## 2022-11-02 PROCEDURE — 3044F PR MOST RECENT HEMOGLOBIN A1C LEVEL <7.0%: ICD-10-PCS | Mod: CPTII,S$GLB,, | Performed by: FAMILY MEDICINE

## 2022-11-02 PROCEDURE — 3044F HG A1C LEVEL LT 7.0%: CPT | Mod: CPTII,S$GLB,, | Performed by: FAMILY MEDICINE

## 2022-11-02 PROCEDURE — 3008F PR BODY MASS INDEX (BMI) DOCUMENTED: ICD-10-PCS | Mod: CPTII,S$GLB,, | Performed by: FAMILY MEDICINE

## 2022-11-02 PROCEDURE — 1159F MED LIST DOCD IN RCRD: CPT | Mod: CPTII,S$GLB,, | Performed by: FAMILY MEDICINE

## 2022-11-02 PROCEDURE — 1160F PR REVIEW ALL MEDS BY PRESCRIBER/CLIN PHARMACIST DOCUMENTED: ICD-10-PCS | Mod: CPTII,S$GLB,, | Performed by: FAMILY MEDICINE

## 2022-11-02 PROCEDURE — 3008F BODY MASS INDEX DOCD: CPT | Mod: CPTII,S$GLB,, | Performed by: FAMILY MEDICINE

## 2022-11-02 PROCEDURE — 99999 PR PBB SHADOW E&M-EST. PATIENT-LVL IV: ICD-10-PCS | Mod: PBBFAC,,, | Performed by: FAMILY MEDICINE

## 2022-11-02 PROCEDURE — 1159F PR MEDICATION LIST DOCUMENTED IN MEDICAL RECORD: ICD-10-PCS | Mod: CPTII,S$GLB,, | Performed by: FAMILY MEDICINE

## 2022-11-02 PROCEDURE — 99396 PREV VISIT EST AGE 40-64: CPT | Mod: S$GLB,,, | Performed by: FAMILY MEDICINE

## 2022-11-02 PROCEDURE — 3074F SYST BP LT 130 MM HG: CPT | Mod: CPTII,S$GLB,, | Performed by: FAMILY MEDICINE

## 2022-11-02 PROCEDURE — 1160F RVW MEDS BY RX/DR IN RCRD: CPT | Mod: CPTII,S$GLB,, | Performed by: FAMILY MEDICINE

## 2022-11-02 PROCEDURE — 3079F DIAST BP 80-89 MM HG: CPT | Mod: CPTII,S$GLB,, | Performed by: FAMILY MEDICINE

## 2022-11-02 PROCEDURE — 3074F PR MOST RECENT SYSTOLIC BLOOD PRESSURE < 130 MM HG: ICD-10-PCS | Mod: CPTII,S$GLB,, | Performed by: FAMILY MEDICINE

## 2022-11-02 RX ORDER — DESLORATADINE 5 MG/1
5 TABLET ORAL DAILY
Qty: 90 TABLET | Refills: 3 | Status: SHIPPED | OUTPATIENT
Start: 2022-11-02 | End: 2023-09-29

## 2022-11-02 NOTE — PROGRESS NOTES
Subjective:       Patient ID: Ayah Ceron is a 61 y.o. female.    Chief Complaint: Annual Exam    HPI    Ayah Ceron is a 61 y.o. female for checkup.    Labs prior, review    #Cards: HLD    #Hep: Fatty liver, Elev LFTs  - was following with Dr. Vasquez, lv 2017  - consider f/u w/ hep in future     #Neuro: Atypical migraine  - est w/ Dr. Merida, lv 3/2022     #VINOD on cpap  - est w/ NP Naun, lv 9/2021     #BMI 26: S/p Gastric bypass in 2011  - sees bariatric team ~q2yr  - Dr. Ortiz was ordering full panel of labs yearly    The 10-year ASCVD risk score (Orville GUZMAN, et al., 2019) is: 3.3%    Values used to calculate the score:      Age: 61 years      Sex: Female      Is Non- : No      Diabetic: No      Tobacco smoker: No      Systolic Blood Pressure: 128 mmHg      Is BP treated: No      HDL Cholesterol: 67 mg/dL      Total Cholesterol: 233 mg/dL    Review of Systems   Constitutional:  Negative for appetite change, fatigue and fever.   HENT:  Negative for congestion.    Respiratory:  Negative for cough and shortness of breath.    Cardiovascular:  Negative for chest pain and palpitations.   Gastrointestinal:  Negative for abdominal pain, constipation, diarrhea, nausea and vomiting.   Genitourinary:  Negative for dysuria.   Musculoskeletal:  Negative for back pain.   Skin:  Negative for rash.   Neurological:  Negative for weakness, numbness and headaches.       Past Medical History:   Diagnosis Date    Anxiety     situational    Deviated septum     Fibroid     Hordeolum externum of right lower eyelid     Hyperlipemia     Liver disease     fatty liver    PONV (postoperative nausea and vomiting)     Sleep apnea     cpap pressure 8    Steatosis         Prior to Admission medications    Medication Sig Start Date End Date Taking? Authorizing Provider   biotin 1 mg tablet Take 1,000 mcg by mouth 3 (three) times daily.   Yes Historical Provider   calcium citrate-vitamin D3 500 mg calcium -400 unit Chew  Take by mouth. 2 tablets once per day   Yes Historical Provider   coenzyme Q10 100 mg capsule Take 100 mg by mouth once daily.   Yes Historical Provider   conjugated estrogens (PREMARIN) vaginal cream Place 0.5 g vaginally twice a week. 12/6/18  Yes Clarice Gutierrez MD   cyanocobalamin 500 MCG tablet Take 500 mcg by mouth once daily. sublingual   Yes Historical Provider   desloratadine (CLARINEX) 5 mg tablet Take 1 tablet (5 mg total) by mouth once daily. 12/2/21  Yes Elena Ortiz MD   diclofenac sodium (VOLTAREN) 1 % Gel Apply 2 g topically 4 (four) times daily. Apply to affected area 12/6/21  Yes Bright Real MD   docusate sodium (COLACE) 100 MG capsule Take 100 mg by mouth 2 (two) times daily.   Yes Historical Provider   doxycycline monohydrate 100 mg Tab Take 1 tablet (100 mg total) by mouth daily  Patient taking differently: Take 1 tablet (100 mg total) by mouth daily 7/7/20  Yes Neda Clark PA-C   FERROUS FUMARATE/VIT BCOMP&C (SUPER B COMPLEX ORAL) Take by mouth.   Yes Historical Provider   ivermectin (SOOLANTRA) 1 % Crea Apply to affected area of face every night and wash off in morning 7/9/20  Yes Neda Clark PA-C   ivermectin (STROMECTOL) 3 mg Tab Take 6 pills together then repeat in 2 weeks 7/7/20  Yes Neda Clark PA-C   MAGNESIUM OXIDE (MAG-OXIDE ORAL) Take by mouth. Try to get 400-500 a day   Yes Historical Provider   multivitamin capsule Take 1 capsule by mouth 2 (two) times daily.   Yes Historical Provider   riboflavin, vitamin B2, (VITAMIN B-2) 100 mg Tab tablet Take 100 mg by mouth once daily.   Yes Historical Provider   sulfacetamide sodium-sulfur (SULFACLEANSE 8-4) 8-4 % Susp Wash face qd - bid 7/7/20  Yes Neda Clark PA-C   metronidazole 1% (METROGEL) 1 % Gel Apply topically once daily. 11/2/20 11/2/21  Elena Ortiz MD        Past medical history, surgical history, and family medical history reviewed and updated as appropriate.    Medications and allergies  "reviewed.     Objective:          Vitals:    11/02/22 1519   BP: 128/84   BP Location: Right arm   Patient Position: Sitting   BP Method: Medium (Manual)   Pulse: 71   SpO2: 97%   Weight: 69.5 kg (153 lb 3.5 oz)   Height: 5' 4" (1.626 m)     Body mass index is 26.3 kg/m².  Physical Exam  Vitals and nursing note reviewed.   Constitutional:       General: She is not in acute distress.     Appearance: Normal appearance.   Eyes:      Extraocular Movements: Extraocular movements intact.   Cardiovascular:      Rate and Rhythm: Normal rate and regular rhythm.      Pulses: Normal pulses.      Heart sounds: Normal heart sounds. No murmur heard.  Pulmonary:      Effort: Pulmonary effort is normal. No respiratory distress.      Breath sounds: Normal breath sounds. No wheezing.   Neurological:      Mental Status: She is alert and oriented to person, place, and time.   Psychiatric:         Mood and Affect: Mood normal.         Behavior: Behavior normal.       Lab Results   Component Value Date    WBC 3.81 (L) 10/25/2022    HGB 14.6 10/25/2022    HCT 43.7 10/25/2022     10/25/2022    CHOL 233 (H) 10/25/2022    TRIG 79 10/25/2022    HDL 67 10/25/2022    ALT 82 (H) 10/25/2022    AST 46 (H) 10/25/2022     10/25/2022    K 4.0 10/25/2022     10/25/2022    CREATININE 0.7 10/25/2022    BUN 15 10/25/2022    CO2 29 10/25/2022    TSH 3.754 10/25/2022    INR 1.1 10/17/2012    HGBA1C 5.2 10/25/2022       Assessment:       1. Annual physical exam    2. NAFL (nonalcoholic fatty liver)          Plan:   1. Annual physical exam    2. NAFL (nonalcoholic fatty liver)  Overview:  lov w/ hepatology in 2017, consider seeing again after next set of labs if persistently high LFTs      Other orders  -     desloratadine (CLARINEX) 5 mg tablet; Take 1 tablet (5 mg total) by mouth once daily.  Dispense: 90 tablet; Refill: 3      Health maintenance reviewed with patient.     No follow-ups on file.    Cruz Renteria MD  Family Medicine / " Primary Care  [facility]  11/2/2022

## 2022-11-13 ENCOUNTER — PATIENT MESSAGE (OUTPATIENT)
Dept: INTERNAL MEDICINE | Facility: CLINIC | Age: 62
End: 2022-11-13
Payer: COMMERCIAL

## 2022-11-14 ENCOUNTER — PATIENT MESSAGE (OUTPATIENT)
Dept: INTERNAL MEDICINE | Facility: CLINIC | Age: 62
End: 2022-11-14
Payer: COMMERCIAL

## 2022-11-14 RX ORDER — ERYTHROMYCIN 5 MG/G
OINTMENT OPHTHALMIC
Qty: 3.5 G | Refills: 1 | Status: SHIPPED | OUTPATIENT
Start: 2022-11-14

## 2022-11-17 ENCOUNTER — OFFICE VISIT (OUTPATIENT)
Dept: OPHTHALMOLOGY | Facility: CLINIC | Age: 62
End: 2022-11-17
Payer: COMMERCIAL

## 2022-11-17 ENCOUNTER — PATIENT MESSAGE (OUTPATIENT)
Dept: OPTOMETRY | Facility: CLINIC | Age: 62
End: 2022-11-17
Payer: COMMERCIAL

## 2022-11-17 DIAGNOSIS — H00.15 CHALAZION OF LEFT LOWER EYELID: Primary | ICD-10-CM

## 2022-11-17 PROCEDURE — 1160F RVW MEDS BY RX/DR IN RCRD: CPT | Mod: CPTII,S$GLB,, | Performed by: OPHTHALMOLOGY

## 2022-11-17 PROCEDURE — 1160F PR REVIEW ALL MEDS BY PRESCRIBER/CLIN PHARMACIST DOCUMENTED: ICD-10-PCS | Mod: CPTII,S$GLB,, | Performed by: OPHTHALMOLOGY

## 2022-11-17 PROCEDURE — 1159F PR MEDICATION LIST DOCUMENTED IN MEDICAL RECORD: ICD-10-PCS | Mod: CPTII,S$GLB,, | Performed by: OPHTHALMOLOGY

## 2022-11-17 PROCEDURE — 1159F MED LIST DOCD IN RCRD: CPT | Mod: CPTII,S$GLB,, | Performed by: OPHTHALMOLOGY

## 2022-11-17 PROCEDURE — 99999 PR PBB SHADOW E&M-EST. PATIENT-LVL III: CPT | Mod: PBBFAC,,, | Performed by: OPHTHALMOLOGY

## 2022-11-17 PROCEDURE — 99202 OFFICE O/P NEW SF 15 MIN: CPT | Mod: S$GLB,,, | Performed by: OPHTHALMOLOGY

## 2022-11-17 PROCEDURE — 3044F PR MOST RECENT HEMOGLOBIN A1C LEVEL <7.0%: ICD-10-PCS | Mod: CPTII,S$GLB,, | Performed by: OPHTHALMOLOGY

## 2022-11-17 PROCEDURE — 3044F HG A1C LEVEL LT 7.0%: CPT | Mod: CPTII,S$GLB,, | Performed by: OPHTHALMOLOGY

## 2022-11-17 PROCEDURE — 99202 PR OFFICE/OUTPT VISIT, NEW, LEVL II, 15-29 MIN: ICD-10-PCS | Mod: S$GLB,,, | Performed by: OPHTHALMOLOGY

## 2022-11-17 PROCEDURE — 99999 PR PBB SHADOW E&M-EST. PATIENT-LVL III: ICD-10-PCS | Mod: PBBFAC,,, | Performed by: OPHTHALMOLOGY

## 2022-11-17 NOTE — PROGRESS NOTES
HPI    Triage pt  Patient states OS bump LLL x 3 days ago.  Using ointment 3x a day and cold/hot compresses.   Vision stable.    I have personally interviewed the patient, reviewed the history and   examined the patient and agree with the technician's &/or resident's exam,   assessment and plan.     Last edited by Jay Pabon MD on 11/17/2022  9:56 AM.            Assessment /Plan     For exam results, see Encounter Report.    Chalazion of left lower eyelid      Warm compresses. Lid cleaning. Return if does not improve over next two weeks.

## 2022-12-05 ENCOUNTER — HOSPITAL ENCOUNTER (OUTPATIENT)
Dept: RADIOLOGY | Facility: HOSPITAL | Age: 62
Discharge: HOME | End: 2022-12-05
Attending: FAMILY MEDICINE
Payer: COMMERCIAL

## 2022-12-05 DIAGNOSIS — Z12.31 ENCOUNTER FOR SCREENING MAMMOGRAM FOR BREAST CANCER: ICD-10-CM

## 2022-12-05 PROCEDURE — 77067 MAMMO DIGITAL SCREENING BILAT WITH TOMO: ICD-10-PCS | Mod: 26,,, | Performed by: RADIOLOGY

## 2022-12-05 PROCEDURE — 77067 SCR MAMMO BI INCL CAD: CPT | Mod: 26,,, | Performed by: RADIOLOGY

## 2022-12-05 PROCEDURE — 77063 BREAST TOMOSYNTHESIS BI: CPT | Mod: 26,,, | Performed by: RADIOLOGY

## 2022-12-05 PROCEDURE — 77063 BREAST TOMOSYNTHESIS BI: CPT | Mod: TC

## 2022-12-05 PROCEDURE — 77063 MAMMO DIGITAL SCREENING BILAT WITH TOMO: ICD-10-PCS | Mod: 26,,, | Performed by: RADIOLOGY

## 2022-12-18 ENCOUNTER — PATIENT MESSAGE (OUTPATIENT)
Dept: INTERNAL MEDICINE | Facility: CLINIC | Age: 62
End: 2022-12-18
Payer: COMMERCIAL

## 2022-12-18 DIAGNOSIS — R30.0 DYSURIA: Primary | ICD-10-CM

## 2022-12-19 ENCOUNTER — PATIENT MESSAGE (OUTPATIENT)
Dept: INTERNAL MEDICINE | Facility: CLINIC | Age: 62
End: 2022-12-19
Payer: COMMERCIAL

## 2022-12-19 ENCOUNTER — LAB VISIT (OUTPATIENT)
Dept: LAB | Facility: HOSPITAL | Age: 62
End: 2022-12-19
Attending: FAMILY MEDICINE
Payer: COMMERCIAL

## 2022-12-19 DIAGNOSIS — R30.0 DYSURIA: ICD-10-CM

## 2022-12-19 LAB
BACTERIA #/AREA URNS AUTO: ABNORMAL /HPF
BILIRUB UR QL STRIP: NEGATIVE
CLARITY UR REFRACT.AUTO: ABNORMAL
COLOR UR AUTO: YELLOW
GLUCOSE UR QL STRIP: NEGATIVE
HGB UR QL STRIP: NEGATIVE
HYALINE CASTS UR QL AUTO: 0 /LPF
KETONES UR QL STRIP: NEGATIVE
LEUKOCYTE ESTERASE UR QL STRIP: ABNORMAL
MICROSCOPIC COMMENT: ABNORMAL
NITRITE UR QL STRIP: POSITIVE
PH UR STRIP: 7 [PH] (ref 5–8)
PROT UR QL STRIP: ABNORMAL
RBC #/AREA URNS AUTO: 3 /HPF (ref 0–4)
SP GR UR STRIP: 1.02 (ref 1–1.03)
URN SPEC COLLECT METH UR: ABNORMAL
WBC #/AREA URNS AUTO: >100 /HPF (ref 0–5)
WBC CLUMPS UR QL AUTO: ABNORMAL

## 2022-12-19 PROCEDURE — 87077 CULTURE AEROBIC IDENTIFY: CPT | Performed by: FAMILY MEDICINE

## 2022-12-19 PROCEDURE — 87086 URINE CULTURE/COLONY COUNT: CPT | Performed by: FAMILY MEDICINE

## 2022-12-19 PROCEDURE — 87088 URINE BACTERIA CULTURE: CPT | Performed by: FAMILY MEDICINE

## 2022-12-19 PROCEDURE — 87186 SC STD MICRODIL/AGAR DIL: CPT | Performed by: FAMILY MEDICINE

## 2022-12-19 PROCEDURE — 81001 URINALYSIS AUTO W/SCOPE: CPT | Performed by: FAMILY MEDICINE

## 2022-12-19 RX ORDER — NITROFURANTOIN 25; 75 MG/1; MG/1
100 CAPSULE ORAL 2 TIMES DAILY
Qty: 10 CAPSULE | Refills: 0 | Status: SHIPPED | OUTPATIENT
Start: 2022-12-19 | End: 2022-12-24

## 2022-12-21 LAB — BACTERIA UR CULT: ABNORMAL

## 2022-12-22 ENCOUNTER — PATIENT MESSAGE (OUTPATIENT)
Dept: INTERNAL MEDICINE | Facility: CLINIC | Age: 62
End: 2022-12-22
Payer: COMMERCIAL

## 2023-03-01 ENCOUNTER — CLINICAL SUPPORT (OUTPATIENT)
Dept: OTHER | Facility: CLINIC | Age: 63
End: 2023-03-01
Payer: COMMERCIAL

## 2023-03-01 DIAGNOSIS — Z00.8 ENCOUNTER FOR OTHER GENERAL EXAMINATION: ICD-10-CM

## 2023-03-02 VITALS
DIASTOLIC BLOOD PRESSURE: 60 MMHG | WEIGHT: 155 LBS | HEIGHT: 64 IN | SYSTOLIC BLOOD PRESSURE: 110 MMHG | BODY MASS INDEX: 26.46 KG/M2

## 2023-03-02 LAB
GLUCOSE SERPL-MCNC: 108 MG/DL (ref 60–140)
HDLC SERPL-MCNC: 63 MG/DL
POC CHOLESTEROL, LDL (DOCK): 138 MG/DL
POC CHOLESTEROL, TOTAL: 234 MG/DL
TRIGL SERPL-MCNC: 186 MG/DL

## 2023-08-02 ENCOUNTER — OFFICE VISIT (OUTPATIENT)
Dept: OPTOMETRY | Facility: CLINIC | Age: 63
End: 2023-08-02
Payer: COMMERCIAL

## 2023-08-02 DIAGNOSIS — H52.202 MYOPIA WITH ASTIGMATISM AND PRESBYOPIA, LEFT: ICD-10-CM

## 2023-08-02 DIAGNOSIS — H52.12 MYOPIA WITH ASTIGMATISM AND PRESBYOPIA, LEFT: ICD-10-CM

## 2023-08-02 DIAGNOSIS — Z01.00 EXAMINATION OF EYES AND VISION: Primary | ICD-10-CM

## 2023-08-02 DIAGNOSIS — H52.4 MYOPIA WITH ASTIGMATISM AND PRESBYOPIA, LEFT: ICD-10-CM

## 2023-08-02 PROCEDURE — 92015 PR REFRACTION: ICD-10-PCS | Mod: S$GLB,,, | Performed by: OPTOMETRIST

## 2023-08-02 PROCEDURE — 92015 DETERMINE REFRACTIVE STATE: CPT | Mod: S$GLB,,, | Performed by: OPTOMETRIST

## 2023-08-02 PROCEDURE — 99999 PR PBB SHADOW E&M-EST. PATIENT-LVL IV: ICD-10-PCS | Mod: PBBFAC,,, | Performed by: OPTOMETRIST

## 2023-08-02 PROCEDURE — 92014 PR EYE EXAM, EST PATIENT,COMPREHESV: ICD-10-PCS | Mod: S$GLB,,, | Performed by: OPTOMETRIST

## 2023-08-02 PROCEDURE — 99999 PR PBB SHADOW E&M-EST. PATIENT-LVL IV: CPT | Mod: PBBFAC,,, | Performed by: OPTOMETRIST

## 2023-08-02 PROCEDURE — 92014 COMPRE OPH EXAM EST PT 1/>: CPT | Mod: S$GLB,,, | Performed by: OPTOMETRIST

## 2023-08-02 NOTE — PATIENT INSTRUCTIONS
Blepharitis is inflammation of the eyelids caused by increased bacteria on the eyelid margins and eyelashes.  It can contribute to Dry Eyes and cause burning especially in the morning. I recommend you use OCusoft Plus foam every night prior to bedtime to help control this condition.          For the Dryness associated with Blepharitis it is recommended you use Systane Complete PF  up to 6 times per day

## 2023-08-02 NOTE — PROGRESS NOTES
HPI    Annual Exam   Pt states Blurred Vision     Pt denies F/F   Pt denies Dry/ Itchy/ Burning  Gtt: No   Last edited by Donna Reyes on 8/2/2023 11:00 AM.            Assessment /Plan     For exam results, see Encounter Report.    Examination of eyes and vision  -Eyemed  -S/P LASIK no ectasia  -Nightly lid scrubs using Ocusoft. Systane Complete PF as needed.    Myopia with astigmatism and presbyopia, left  -none      RTC 1 yr

## 2023-09-13 ENCOUNTER — TELEPHONE (OUTPATIENT)
Dept: INTERNAL MEDICINE | Facility: CLINIC | Age: 63
End: 2023-09-13
Payer: COMMERCIAL

## 2023-09-13 DIAGNOSIS — Z79.899 ENCOUNTER FOR LONG-TERM (CURRENT) USE OF OTHER MEDICATIONS: ICD-10-CM

## 2023-09-13 DIAGNOSIS — Z00.00 ANNUAL PHYSICAL EXAM: Primary | ICD-10-CM

## 2023-09-13 NOTE — TELEPHONE ENCOUNTER
Tried to call pt to let her know that Dr. Renteria put her lab orders in and I was going to help her schedule.     No answer

## 2023-09-13 NOTE — TELEPHONE ENCOUNTER
----- Message from Onur Canseco MA sent at 9/13/2023  8:10 AM CDT -----  Contact: [phone]  Can you put lab orders in for her please   ----- Message -----  From: Christina Marquez  Sent: 9/12/2023   4:53 PM CDT  To: Dexter Arroyo Staff    Pt has an annual scheduled for 11/06. Pt would like to have lab orders put in and scheduled for an am appt at the 2nd floor [facility] location. Please call to schedule.              Thank you

## 2023-09-28 ENCOUNTER — PATIENT MESSAGE (OUTPATIENT)
Dept: INTERNAL MEDICINE | Facility: CLINIC | Age: 63
End: 2023-09-28
Payer: COMMERCIAL

## 2023-09-29 RX ORDER — LORATADINE 10 MG/1
10 TABLET ORAL DAILY
Qty: 90 TABLET | Refills: 3 | Status: SHIPPED | OUTPATIENT
Start: 2023-09-29 | End: 2024-09-28

## 2023-10-09 ENCOUNTER — PATIENT MESSAGE (OUTPATIENT)
Dept: ADMINISTRATIVE | Facility: HOSPITAL | Age: 63
End: 2023-10-09
Payer: COMMERCIAL

## 2023-10-31 ENCOUNTER — LAB VISIT (OUTPATIENT)
Dept: LAB | Facility: HOSPITAL | Age: 63
End: 2023-10-31
Payer: COMMERCIAL

## 2023-10-31 DIAGNOSIS — Z00.00 ANNUAL PHYSICAL EXAM: ICD-10-CM

## 2023-10-31 DIAGNOSIS — Z79.899 ENCOUNTER FOR LONG-TERM (CURRENT) USE OF OTHER MEDICATIONS: ICD-10-CM

## 2023-10-31 LAB
ALBUMIN SERPL BCP-MCNC: 3.7 G/DL (ref 3.5–5.2)
ALP SERPL-CCNC: 89 U/L (ref 55–135)
ALT SERPL W/O P-5'-P-CCNC: 47 U/L (ref 10–44)
ANION GAP SERPL CALC-SCNC: 11 MMOL/L (ref 8–16)
AST SERPL-CCNC: 33 U/L (ref 10–40)
BILIRUB SERPL-MCNC: 0.4 MG/DL (ref 0.1–1)
BUN SERPL-MCNC: 11 MG/DL (ref 8–23)
CALCIUM SERPL-MCNC: 9 MG/DL (ref 8.7–10.5)
CHLORIDE SERPL-SCNC: 106 MMOL/L (ref 95–110)
CHOLEST SERPL-MCNC: 216 MG/DL (ref 120–199)
CHOLEST/HDLC SERPL: 3.6 {RATIO} (ref 2–5)
CO2 SERPL-SCNC: 26 MMOL/L (ref 23–29)
CREAT SERPL-MCNC: 0.6 MG/DL (ref 0.5–1.4)
ERYTHROCYTE [DISTWIDTH] IN BLOOD BY AUTOMATED COUNT: 12.9 % (ref 11.5–14.5)
EST. GFR  (NO RACE VARIABLE): >60 ML/MIN/1.73 M^2
ESTIMATED AVG GLUCOSE: 103 MG/DL (ref 68–131)
FERRITIN SERPL-MCNC: 48 NG/ML (ref 20–300)
GLUCOSE SERPL-MCNC: 93 MG/DL (ref 70–110)
HBA1C MFR BLD: 5.2 % (ref 4–5.6)
HCT VFR BLD AUTO: 42.5 % (ref 37–48.5)
HDLC SERPL-MCNC: 60 MG/DL (ref 40–75)
HDLC SERPL: 27.8 % (ref 20–50)
HGB BLD-MCNC: 14 G/DL (ref 12–16)
IRON SERPL-MCNC: 119 UG/DL (ref 30–160)
LDLC SERPL CALC-MCNC: 141.6 MG/DL (ref 63–159)
MAGNESIUM SERPL-MCNC: 2 MG/DL (ref 1.6–2.6)
MCH RBC QN AUTO: 30.2 PG (ref 27–31)
MCHC RBC AUTO-ENTMCNC: 32.9 G/DL (ref 32–36)
MCV RBC AUTO: 92 FL (ref 82–98)
NONHDLC SERPL-MCNC: 156 MG/DL
PLATELET # BLD AUTO: 272 K/UL (ref 150–450)
PMV BLD AUTO: 9.9 FL (ref 9.2–12.9)
POTASSIUM SERPL-SCNC: 4.1 MMOL/L (ref 3.5–5.1)
PROT SERPL-MCNC: 6.1 G/DL (ref 6–8.4)
RBC # BLD AUTO: 4.63 M/UL (ref 4–5.4)
SATURATED IRON: 33 % (ref 20–50)
SODIUM SERPL-SCNC: 143 MMOL/L (ref 136–145)
TOTAL IRON BINDING CAPACITY: 358 UG/DL (ref 250–450)
TRANSFERRIN SERPL-MCNC: 242 MG/DL (ref 200–375)
TRIGL SERPL-MCNC: 72 MG/DL (ref 30–150)
TSH SERPL DL<=0.005 MIU/L-ACNC: 3.4 UIU/ML (ref 0.4–4)
VIT B12 SERPL-MCNC: 1633 PG/ML (ref 210–950)
WBC # BLD AUTO: 3.69 K/UL (ref 3.9–12.7)

## 2023-10-31 PROCEDURE — 84466 ASSAY OF TRANSFERRIN: CPT | Performed by: FAMILY MEDICINE

## 2023-10-31 PROCEDURE — 84425 ASSAY OF VITAMIN B-1: CPT | Performed by: FAMILY MEDICINE

## 2023-10-31 PROCEDURE — 83036 HEMOGLOBIN GLYCOSYLATED A1C: CPT | Performed by: FAMILY MEDICINE

## 2023-10-31 PROCEDURE — 80061 LIPID PANEL: CPT | Performed by: FAMILY MEDICINE

## 2023-10-31 PROCEDURE — 83540 ASSAY OF IRON: CPT | Performed by: FAMILY MEDICINE

## 2023-10-31 PROCEDURE — 80053 COMPREHEN METABOLIC PANEL: CPT | Performed by: FAMILY MEDICINE

## 2023-10-31 PROCEDURE — 82607 VITAMIN B-12: CPT | Performed by: FAMILY MEDICINE

## 2023-10-31 PROCEDURE — 82728 ASSAY OF FERRITIN: CPT | Performed by: FAMILY MEDICINE

## 2023-10-31 PROCEDURE — 85027 COMPLETE CBC AUTOMATED: CPT | Performed by: FAMILY MEDICINE

## 2023-10-31 PROCEDURE — 83735 ASSAY OF MAGNESIUM: CPT | Performed by: FAMILY MEDICINE

## 2023-10-31 PROCEDURE — 84443 ASSAY THYROID STIM HORMONE: CPT | Performed by: FAMILY MEDICINE

## 2023-11-04 LAB — VIT B1 BLD-MCNC: 105 UG/L (ref 38–122)

## 2023-11-27 ENCOUNTER — OFFICE VISIT (OUTPATIENT)
Dept: INTERNAL MEDICINE | Facility: CLINIC | Age: 63
End: 2023-11-27
Payer: COMMERCIAL

## 2023-11-27 ENCOUNTER — TELEPHONE (OUTPATIENT)
Dept: PODIATRY | Facility: CLINIC | Age: 63
End: 2023-11-27
Payer: COMMERCIAL

## 2023-11-27 VITALS
SYSTOLIC BLOOD PRESSURE: 118 MMHG | BODY MASS INDEX: 26.39 KG/M2 | DIASTOLIC BLOOD PRESSURE: 72 MMHG | HEIGHT: 64 IN | HEART RATE: 63 BPM | OXYGEN SATURATION: 95 % | WEIGHT: 154.56 LBS

## 2023-11-27 DIAGNOSIS — Z79.899 ENCOUNTER FOR LONG-TERM (CURRENT) USE OF OTHER MEDICATIONS: ICD-10-CM

## 2023-11-27 DIAGNOSIS — G47.00 INSOMNIA, UNSPECIFIED TYPE: ICD-10-CM

## 2023-11-27 DIAGNOSIS — M79.673 PAIN OF FOOT, UNSPECIFIED LATERALITY: ICD-10-CM

## 2023-11-27 DIAGNOSIS — Z00.00 ANNUAL PHYSICAL EXAM: Primary | ICD-10-CM

## 2023-11-27 PROCEDURE — 1159F PR MEDICATION LIST DOCUMENTED IN MEDICAL RECORD: ICD-10-PCS | Mod: CPTII,S$GLB,, | Performed by: FAMILY MEDICINE

## 2023-11-27 PROCEDURE — 3044F HG A1C LEVEL LT 7.0%: CPT | Mod: CPTII,S$GLB,, | Performed by: FAMILY MEDICINE

## 2023-11-27 PROCEDURE — 3078F PR MOST RECENT DIASTOLIC BLOOD PRESSURE < 80 MM HG: ICD-10-PCS | Mod: CPTII,S$GLB,, | Performed by: FAMILY MEDICINE

## 2023-11-27 PROCEDURE — 3078F DIAST BP <80 MM HG: CPT | Mod: CPTII,S$GLB,, | Performed by: FAMILY MEDICINE

## 2023-11-27 PROCEDURE — 3044F PR MOST RECENT HEMOGLOBIN A1C LEVEL <7.0%: ICD-10-PCS | Mod: CPTII,S$GLB,, | Performed by: FAMILY MEDICINE

## 2023-11-27 PROCEDURE — 99999 PR PBB SHADOW E&M-EST. PATIENT-LVL V: ICD-10-PCS | Mod: PBBFAC,,, | Performed by: FAMILY MEDICINE

## 2023-11-27 PROCEDURE — 3008F BODY MASS INDEX DOCD: CPT | Mod: CPTII,S$GLB,, | Performed by: FAMILY MEDICINE

## 2023-11-27 PROCEDURE — 99396 PR PREVENTIVE VISIT,EST,40-64: ICD-10-PCS | Mod: S$GLB,,, | Performed by: FAMILY MEDICINE

## 2023-11-27 PROCEDURE — 3074F SYST BP LT 130 MM HG: CPT | Mod: CPTII,S$GLB,, | Performed by: FAMILY MEDICINE

## 2023-11-27 PROCEDURE — 99396 PREV VISIT EST AGE 40-64: CPT | Mod: S$GLB,,, | Performed by: FAMILY MEDICINE

## 2023-11-27 PROCEDURE — 99999 PR PBB SHADOW E&M-EST. PATIENT-LVL V: CPT | Mod: PBBFAC,,, | Performed by: FAMILY MEDICINE

## 2023-11-27 PROCEDURE — 1159F MED LIST DOCD IN RCRD: CPT | Mod: CPTII,S$GLB,, | Performed by: FAMILY MEDICINE

## 2023-11-27 PROCEDURE — 3008F PR BODY MASS INDEX (BMI) DOCUMENTED: ICD-10-PCS | Mod: CPTII,S$GLB,, | Performed by: FAMILY MEDICINE

## 2023-11-27 PROCEDURE — 3074F PR MOST RECENT SYSTOLIC BLOOD PRESSURE < 130 MM HG: ICD-10-PCS | Mod: CPTII,S$GLB,, | Performed by: FAMILY MEDICINE

## 2023-11-27 RX ORDER — ALPRAZOLAM 0.25 MG/1
0.25 TABLET ORAL NIGHTLY PRN
Qty: 30 TABLET | Refills: 0 | Status: SHIPPED | OUTPATIENT
Start: 2023-11-27 | End: 2023-12-28

## 2023-11-27 NOTE — PROGRESS NOTES
Subjective:       Patient ID: Ayah Ceron is a 62 y.o. female.    Chief Complaint: Annual Exam    HPI    Ayah Ceron is a 62 y.o. female PMHx HLD, VINOD for checkup.     Labs prior, review    Trouble staying asleep, racing thoughts  No relief w/ melatonin    Issues extending 2nd toe bilateral feet, leading to skin irritation in footwear.     #Cards: HLD     #Hep: Fatty liver, Elev LFTs  - was following with Dr. Vasquez, lv 2017  - consider f/u w/ hep in future     #Neuro: Atypical migraine  - est w/ Dr. Merida, lv 3/2022     #Sleep med: VINOD on cpap  - est w/ NP Naun, lv 9/2021     #BMI 26: S/p Gastric bypass in 2011  - sees bariatric team ~q2yr  - Dr. Ortiz was ordering full panel of labs yearly    The 10-year ASCVD risk score (Orville GUZMAN, et al., 2019) is: 3.1%    Values used to calculate the score:      Age: 62 years      Sex: Female      Is Non- : No      Diabetic: No      Tobacco smoker: No      Systolic Blood Pressure: 118 mmHg      Is BP treated: No      HDL Cholesterol: 60 mg/dL      Total Cholesterol: 216 mg/dL      Review of Systems   Constitutional:  Negative for appetite change, fatigue and fever.   HENT:  Negative for congestion.    Respiratory:  Negative for cough and shortness of breath.    Cardiovascular:  Negative for chest pain and palpitations.   Gastrointestinal:  Negative for abdominal pain, constipation, diarrhea, nausea and vomiting.   Genitourinary:  Negative for dysuria.   Musculoskeletal:  Positive for arthralgias. Negative for back pain.   Skin:  Negative for rash.   Neurological:  Negative for weakness, numbness and headaches.         Past Medical History:   Diagnosis Date    Anxiety     situational    Deviated septum     Fibroid     Hordeolum externum of right lower eyelid     Hyperlipemia     Liver disease     fatty liver    PONV (postoperative nausea and vomiting)     Sleep apnea     cpap pressure 8    Steatosis         Prior to Admission medications     Medication Sig Start Date End Date Taking? Authorizing Provider   biotin 1 mg tablet Take 1,000 mcg by mouth 3 (three) times daily.    Provider, Historical   calcium citrate-vitamin D3 500 mg calcium -400 unit Chew Take by mouth. 2 tablets once per day    Provider, Historical   coenzyme Q10 100 mg capsule Take 100 mg by mouth once daily.    Provider, Historical   conjugated estrogens (PREMARIN) vaginal cream Place 0.5 g vaginally twice a week. 12/6/18   Clarice Gutierrez MD   cyanocobalamin 500 MCG tablet Take 500 mcg by mouth once daily. sublingual    Provider, Historical   diclofenac sodium (VOLTAREN) 1 % Gel Apply 2 g topically 4 (four) times daily. Apply to affected area 12/6/21   Bright Real MD   docusate sodium (COLACE) 100 MG capsule Take 100 mg by mouth 2 (two) times daily.    Provider, Historical   doxycycline monohydrate 100 mg Tab Take 1 tablet (100 mg total) by mouth daily  Patient not taking: Reported on 8/2/2023 7/7/20   Neda Clark PA-C   erythromycin (ROMYCIN) ophthalmic ointment Place a 1/2 inch ribbon of ointment into the lower eyelid  Patient not taking: Reported on 8/2/2023 11/14/22   Cruz Renteria MD   FERROUS FUMARATE/VIT BCOMP&C (SUPER B COMPLEX ORAL) Take by mouth.    Provider, Historical   ivermectin (SOOLANTRA) 1 % Crea Apply to affected area of face every night and wash off in morning 7/9/20   Neda Clark PA-C   ivermectin (STROMECTOL) 3 mg Tab Take 6 pills together then repeat in 2 weeks 7/7/20   Neda Clark PA-C   loratadine (CLARITIN) 10 mg tablet Take 1 tablet (10 mg total) by mouth once daily. 9/29/23 9/28/24  Cruz Renteria MD   MAGNESIUM OXIDE (MAG-OXIDE ORAL) Take by mouth. Try to get 400-500 a day    Provider, Historical   metronidazole 1% (METROGEL) 1 % Gel Apply topically once daily. 11/2/20 11/2/21  Elena Ortiz MD   multivitamin capsule Take 1 capsule by mouth 2 (two) times daily.    Provider, Historical   riboflavin, vitamin B2,  "(VITAMIN B-2) 100 mg Tab tablet Take 100 mg by mouth once daily.    Provider, Historical   sulfacetamide sodium-sulfur (SULFACLEANSE 8-4) 8-4 % Susp Wash face qd - bid 7/7/20   Neda Clark PA-C        Past medical history, surgical history, and family medical history reviewed and updated as appropriate.    Medications and allergies reviewed.     Objective:          Vitals:    11/27/23 1322   BP: 118/72   BP Location: Right arm   Patient Position: Sitting   Pulse: 63   SpO2: 95%   Weight: 70.1 kg (154 lb 8.7 oz)   Height: 5' 4" (1.626 m)     Body mass index is 26.53 kg/m².  Physical Exam  Vitals and nursing note reviewed.   Constitutional:       General: She is not in acute distress.  Cardiovascular:      Rate and Rhythm: Normal rate and regular rhythm.      Pulses: Normal pulses.      Heart sounds: Normal heart sounds. No murmur heard.  Pulmonary:      Effort: Pulmonary effort is normal. No respiratory distress.      Breath sounds: Normal breath sounds. No wheezing.   Neurological:      Mental Status: She is alert and oriented to person, place, and time.   Psychiatric:         Mood and Affect: Mood normal.         Behavior: Behavior normal.         Lab Results   Component Value Date    WBC 3.69 (L) 10/31/2023    HGB 14.0 10/31/2023    HCT 42.5 10/31/2023     10/31/2023    CHOL 216 (H) 10/31/2023    TRIG 72 10/31/2023    HDL 60 10/31/2023    ALT 47 (H) 10/31/2023    AST 33 10/31/2023     10/31/2023    K 4.1 10/31/2023     10/31/2023    CREATININE 0.6 10/31/2023    BUN 11 10/31/2023    CO2 26 10/31/2023    TSH 3.399 10/31/2023    INR 1.1 10/17/2012    HGBA1C 5.2 10/31/2023       Assessment:       1. Annual physical exam    2. Encounter for long-term (current) use of other medications    3. Pain of foot, unspecified laterality    4. Insomnia, unspecified type          Plan:   1. Annual physical exam    2. Encounter for long-term (current) use of other medications    3. Pain of foot, " unspecified laterality  -     Ambulatory referral/consult to Podiatry; Future; Expected date: 12/04/2023    4. Insomnia, unspecified type  Overview:  Trouble staying asleep      Other orders  -     ALPRAZolam (XANAX) 0.25 MG tablet; Take 1 tablet (0.25 mg total) by mouth nightly as needed for Anxiety.  Dispense: 30 tablet; Refill: 0        Health maintenance reviewed with patient.     Follow up in about 6 weeks (around 1/8/2024) for Virtual checkup.    Cruz Renteria MD  Family Medicine / Primary Care  [facility]  11/27/2023

## 2023-11-27 NOTE — TELEPHONE ENCOUNTER
Spoke with patient and gave her the next available at [facility], had sooner appointments at [address] [place] [address] and [facility] but patient refused. Place her name on the waiting list. 12/14/23 at 7:00 am with Dr HALI Henderson

## 2023-11-27 NOTE — TELEPHONE ENCOUNTER
----- Message from Gabby Schultz sent at 11/27/2023  3:15 PM CST -----  Regarding: Appt requested  Contact: [phone]  Hi, pt called to request an appt for  Pain of foot, unspecified laterality [M79.673]. Pls clal the pt at  [phone] to spk with the pt.  Thank you.

## 2023-12-07 ENCOUNTER — HOSPITAL ENCOUNTER (OUTPATIENT)
Dept: RADIOLOGY | Facility: HOSPITAL | Age: 63
Discharge: HOME | End: 2023-12-07
Attending: FAMILY MEDICINE
Payer: COMMERCIAL

## 2023-12-07 DIAGNOSIS — Z12.31 ENCOUNTER FOR SCREENING MAMMOGRAM FOR BREAST CANCER: ICD-10-CM

## 2023-12-07 PROCEDURE — 77063 MAMMO DIGITAL SCREENING BILAT WITH TOMO: ICD-10-PCS | Mod: 26,,, | Performed by: RADIOLOGY

## 2023-12-07 PROCEDURE — 77063 BREAST TOMOSYNTHESIS BI: CPT | Mod: 26,,, | Performed by: RADIOLOGY

## 2023-12-07 PROCEDURE — 77067 SCR MAMMO BI INCL CAD: CPT | Mod: 26,,, | Performed by: RADIOLOGY

## 2023-12-07 PROCEDURE — 77067 MAMMO DIGITAL SCREENING BILAT WITH TOMO: ICD-10-PCS | Mod: 26,,, | Performed by: RADIOLOGY

## 2023-12-07 PROCEDURE — 77067 SCR MAMMO BI INCL CAD: CPT | Mod: TC

## 2023-12-12 ENCOUNTER — OFFICE VISIT (OUTPATIENT)
Dept: OBSTETRICS AND GYNECOLOGY | Facility: CLINIC | Age: 63
End: 2023-12-12
Payer: COMMERCIAL

## 2023-12-12 VITALS
WEIGHT: 150.81 LBS | BODY MASS INDEX: 25.75 KG/M2 | SYSTOLIC BLOOD PRESSURE: 114 MMHG | DIASTOLIC BLOOD PRESSURE: 70 MMHG | HEIGHT: 64 IN

## 2023-12-12 DIAGNOSIS — Z12.31 BREAST CANCER SCREENING BY MAMMOGRAM: ICD-10-CM

## 2023-12-12 DIAGNOSIS — Z01.419 ENCOUNTER FOR ANNUAL ROUTINE GYNECOLOGICAL EXAMINATION: Primary | ICD-10-CM

## 2023-12-12 DIAGNOSIS — R10.2 PELVIC PAIN: ICD-10-CM

## 2023-12-12 DIAGNOSIS — Z80.3 FAMILY HISTORY OF BREAST CANCER: ICD-10-CM

## 2023-12-12 DIAGNOSIS — Z78.0 ENCOUNTER FOR OSTEOPOROSIS SCREENING IN ASYMPTOMATIC POSTMENOPAUSAL PATIENT: ICD-10-CM

## 2023-12-12 DIAGNOSIS — Z13.820 ENCOUNTER FOR OSTEOPOROSIS SCREENING IN ASYMPTOMATIC POSTMENOPAUSAL PATIENT: ICD-10-CM

## 2023-12-12 PROCEDURE — 3044F HG A1C LEVEL LT 7.0%: CPT | Mod: CPTII,S$GLB,, | Performed by: OBSTETRICS & GYNECOLOGY

## 2023-12-12 PROCEDURE — 3008F PR BODY MASS INDEX (BMI) DOCUMENTED: ICD-10-PCS | Mod: CPTII,S$GLB,, | Performed by: OBSTETRICS & GYNECOLOGY

## 2023-12-12 PROCEDURE — 3078F DIAST BP <80 MM HG: CPT | Mod: CPTII,S$GLB,, | Performed by: OBSTETRICS & GYNECOLOGY

## 2023-12-12 PROCEDURE — 1160F RVW MEDS BY RX/DR IN RCRD: CPT | Mod: CPTII,S$GLB,, | Performed by: OBSTETRICS & GYNECOLOGY

## 2023-12-12 PROCEDURE — 3074F PR MOST RECENT SYSTOLIC BLOOD PRESSURE < 130 MM HG: ICD-10-PCS | Mod: CPTII,S$GLB,, | Performed by: OBSTETRICS & GYNECOLOGY

## 2023-12-12 PROCEDURE — 3044F PR MOST RECENT HEMOGLOBIN A1C LEVEL <7.0%: ICD-10-PCS | Mod: CPTII,S$GLB,, | Performed by: OBSTETRICS & GYNECOLOGY

## 2023-12-12 PROCEDURE — 3074F SYST BP LT 130 MM HG: CPT | Mod: CPTII,S$GLB,, | Performed by: OBSTETRICS & GYNECOLOGY

## 2023-12-12 PROCEDURE — 1159F PR MEDICATION LIST DOCUMENTED IN MEDICAL RECORD: ICD-10-PCS | Mod: CPTII,S$GLB,, | Performed by: OBSTETRICS & GYNECOLOGY

## 2023-12-12 PROCEDURE — 99386 PR PREVENTIVE VISIT,NEW,40-64: ICD-10-PCS | Mod: S$GLB,,, | Performed by: OBSTETRICS & GYNECOLOGY

## 2023-12-12 PROCEDURE — 1160F PR REVIEW ALL MEDS BY PRESCRIBER/CLIN PHARMACIST DOCUMENTED: ICD-10-PCS | Mod: CPTII,S$GLB,, | Performed by: OBSTETRICS & GYNECOLOGY

## 2023-12-12 PROCEDURE — 3078F PR MOST RECENT DIASTOLIC BLOOD PRESSURE < 80 MM HG: ICD-10-PCS | Mod: CPTII,S$GLB,, | Performed by: OBSTETRICS & GYNECOLOGY

## 2023-12-12 PROCEDURE — 3008F BODY MASS INDEX DOCD: CPT | Mod: CPTII,S$GLB,, | Performed by: OBSTETRICS & GYNECOLOGY

## 2023-12-12 PROCEDURE — 99386 PREV VISIT NEW AGE 40-64: CPT | Mod: S$GLB,,, | Performed by: OBSTETRICS & GYNECOLOGY

## 2023-12-12 PROCEDURE — 1159F MED LIST DOCD IN RCRD: CPT | Mod: CPTII,S$GLB,, | Performed by: OBSTETRICS & GYNECOLOGY

## 2023-12-12 NOTE — PATIENT INSTRUCTIONS
Please check out the [employer] College of [employer] PATIENT WEBSITE.  The site has education materials, patient stories, expert views, and a portal for you to ask questions.       [email or web address]      As always, please let me know if you have any questions.     Dr. Shani Muniz

## 2023-12-12 NOTE — PROGRESS NOTES
Chief Complaint: Well Woman Exam     HPI:      Ayah Ceron is a 62 y.o.  s/p hysterectomy for AUB-L who presents today for well woman exam. Reports years of dyspareunia. Denies dryness. Ms. Ceron is currently sexually active with a single male partner.  She declines STD screening today.    Previous Pap:      (No result found)  Previous Mammogram:     Results for orders placed during the hospital encounter of 23    Mammo Digital Screening Bilat w/ Alessio    Narrative  Result:  Mammo Digital Screening Bilat w/ Alessio    History:  Patient is 62 y.o. and is seen for a screening mammogram.      Films Compared:  Compared to: 2022 Mammo Digital Screening Bilat w/ Alessio and 2021 Mammo Digital Screening Bilat w/ Alessio    Findings:  This procedure was performed using tomosynthesis.  Computer-aided detection was utilized in the interpretation of this examination.    The breasts have scattered areas of fibroglandular density. There is no evidence of suspicious masses, microcalcifications or architectural distortion.    Impression  No mammographic evidence of malignancy.    BI-RADS Category 1: Negative    Recommendation:  Routine screening mammogram in 1 year is recommended.    Your estimated lifetime risk of breast cancer (to age 85) based on Tyrer-Cuzick risk assessment model is 8.48 %.  According to the American Cancer Society, patients with a lifetime breast cancer risk of 20% or higher might benefit from supplemental screening tests, such as screening breast MRI.     Most Recent Dexa: in 30s after stress fracture in foot  The ten year probability of fracture (%) without BMD    Major osteoporotic 28%    Hip Fracture 2.6 %    Colonoscopy: , repeat 10 years    Gardasil:has never had     Patient Active Problem List   Diagnosis    Functional dyspareunia    Deviated septum    Sinus problem    Status post gastric surgery    Vaginal relaxation    Incomplete defecation    Rectocele    Atrophic vaginitis     NAFL (nonalcoholic fatty liver)    VINOD on CPAP    Elevated transaminase level    Overweight (BMI 25.0-29.9)    Mixed hyperlipidemia    [place] cardiac risk <10% in next 10 years    Insomnia       Past Medical History:   Diagnosis Date    Anxiety     situational    Deviated septum     Fibroid     Hordeolum externum of right lower eyelid     Hyperlipemia     Liver disease     fatty liver    PONV (postoperative nausea and vomiting)     Sleep apnea     cpap pressure 8    Steatosis        Past Surgical History:   Procedure Laterality Date    CARPAL TUNNEL RELEASE      COLONOSCOPY N/A 2022    Procedure: COLONOSCOPY;  Surgeon: Cruz Stahl MD;  [facility] ([address] [place] [address]);  Service: Endoscopy;  Laterality: N/A;  old order , new order placed  fully vaccinated, instructions sent to [facility]  22 - Pt confirmed 0700 arrival time - [facility]    DILATION AND CURETTAGE OF UTERUS      GASTRIC BYPASS  2011    HEMORRHOID SURGERY      HYSTERECTOMY, VAGINAL, WITH SALPINGO-OOPHORECTOMY      uterine fibroids    LASIK      10/26/03    TONSILLECTOMY, ADENOIDECTOMY      TUBAL LIGATION      WISDOM TOOTH EXTRACTION         OB History          6    Para   3    Term   2       1    AB   3    Living   3         SAB   3    IAB        Ectopic        Multiple        Live Births   3           Obstetric Comments   No abnormal pap  No STDs               ROS:     Review of Systems   Constitutional:  Negative for activity change, fatigue and unexpected weight change.   Respiratory:  Negative for shortness of breath.    Cardiovascular:  Negative for chest pain.   Gastrointestinal:  Negative for abdominal pain, blood in stool, constipation and diarrhea.   Endocrine: Negative for cold intolerance and heat intolerance.   Genitourinary:  Negative for bladder incontinence, dyspareunia, dysuria, frequency, hot flashes, vaginal bleeding and vaginal dryness.   Integumentary:  Negative for breast mass,  "breast discharge and breast tenderness.   Psychiatric/Behavioral:  Negative for dysphoric mood. The patient is not nervous/anxious.    Breast: Negative for mass and tenderness      Physical Exam:      PHYSICAL EXAM:  /70   Ht 5' 4" (1.626 m)   Wt 68.4 kg (150 lb 12.7 oz)   BMI 25.88 kg/m²   Body mass index is 25.88 kg/m².     APPEARANCE: Well nourished, well developed, in no acute distress.  PSYCH: Appropriate mood and affect.  SKIN: No acne or hirsutism  NECK: Neck symmetric without masses or thyromegaly  NODES: No inguinal, axillary, or supraclavicular lymph node enlargement  ABDOMEN: Soft.  No tenderness or masses.    CARDIOVASCULAR: No edema of peripheral extremities  BREASTS: Symmetrical, no skin changes or visible lesions.  No palpable masses or nipple discharge bilaterally.  PELVIC: Normal external genitalia without lesions.  Normal hair distribution.  Adequate perineal body, normal urethral meatus.  Vagina moist and well rugated without lesions or discharge. No significant cystocele or rectocele.  + levator ani tenderness, L > R. Uterus and cervix surgically absent. Adnexa without masses or tenderness.      Assessment:     1. Encounter for annual routine gynecological examination        2. Breast cancer screening by mammogram  Mammo Digital Screening Bilat w/ Alessio      3. Family history of breast cancer  Women's Genetics Request      4. Encounter for osteoporosis screening in asymptomatic postmenopausal patient  DXA Bone Density Axial Skeleton 1 or more sites      5. Pelvic pain  Ambulatory referral/consult to Physical/Occupational Therapy            Plan:     Clinical breast exam performed.  Pap history reviewed.  No further pap screening indicated given hysterectomy for benign indications.  Mammogram ordered.  DEXA ordered.  Colonoscopy UTD.  Pelvic floor tenderness: recommend pelvic floor PT.   Follow up in about 1 year (around 12/12/2024) for annual well woman exam or as needed.    Counseling: "     Patient was counseled today on current ASCCP pap guidelines, the recommendation for yearly pelvic exams, healthy diet and exercise routines, breast self awareness and annual mammograms.She is to see her PCP for other health maintenance.     Use of the [employer] Patient Portal discussed and encouraged during today's visit.         Shani Muniz MD  [facility]  12/12/2023

## 2023-12-14 ENCOUNTER — OFFICE VISIT (OUTPATIENT)
Dept: PODIATRY | Facility: CLINIC | Age: 63
End: 2023-12-14
Payer: COMMERCIAL

## 2023-12-14 VITALS
WEIGHT: 154.75 LBS | BODY MASS INDEX: 26.42 KG/M2 | HEIGHT: 64 IN | HEART RATE: 73 BPM | SYSTOLIC BLOOD PRESSURE: 114 MMHG | DIASTOLIC BLOOD PRESSURE: 74 MMHG

## 2023-12-14 DIAGNOSIS — M79.673 PAIN OF FOOT, UNSPECIFIED LATERALITY: ICD-10-CM

## 2023-12-14 DIAGNOSIS — M20.42 HAMMER TOES OF BOTH FEET: Primary | ICD-10-CM

## 2023-12-14 DIAGNOSIS — M20.41 HAMMER TOES OF BOTH FEET: Primary | ICD-10-CM

## 2023-12-14 PROCEDURE — 3008F PR BODY MASS INDEX (BMI) DOCUMENTED: ICD-10-PCS | Mod: CPTII,S$GLB,, | Performed by: PODIATRIST

## 2023-12-14 PROCEDURE — 1159F PR MEDICATION LIST DOCUMENTED IN MEDICAL RECORD: ICD-10-PCS | Mod: CPTII,S$GLB,, | Performed by: PODIATRIST

## 2023-12-14 PROCEDURE — 3008F BODY MASS INDEX DOCD: CPT | Mod: CPTII,S$GLB,, | Performed by: PODIATRIST

## 2023-12-14 PROCEDURE — 3074F SYST BP LT 130 MM HG: CPT | Mod: CPTII,S$GLB,, | Performed by: PODIATRIST

## 2023-12-14 PROCEDURE — 99203 PR OFFICE/OUTPT VISIT, NEW, LEVL III, 30-44 MIN: ICD-10-PCS | Mod: S$GLB,,, | Performed by: PODIATRIST

## 2023-12-14 PROCEDURE — 3074F PR MOST RECENT SYSTOLIC BLOOD PRESSURE < 130 MM HG: ICD-10-PCS | Mod: CPTII,S$GLB,, | Performed by: PODIATRIST

## 2023-12-14 PROCEDURE — 99203 OFFICE O/P NEW LOW 30 MIN: CPT | Mod: S$GLB,,, | Performed by: PODIATRIST

## 2023-12-14 PROCEDURE — 3078F PR MOST RECENT DIASTOLIC BLOOD PRESSURE < 80 MM HG: ICD-10-PCS | Mod: CPTII,S$GLB,, | Performed by: PODIATRIST

## 2023-12-14 PROCEDURE — 99999 PR PBB SHADOW E&M-EST. PATIENT-LVL IV: ICD-10-PCS | Mod: PBBFAC,,, | Performed by: PODIATRIST

## 2023-12-14 PROCEDURE — 3044F PR MOST RECENT HEMOGLOBIN A1C LEVEL <7.0%: ICD-10-PCS | Mod: CPTII,S$GLB,, | Performed by: PODIATRIST

## 2023-12-14 PROCEDURE — 1159F MED LIST DOCD IN RCRD: CPT | Mod: CPTII,S$GLB,, | Performed by: PODIATRIST

## 2023-12-14 PROCEDURE — 3044F HG A1C LEVEL LT 7.0%: CPT | Mod: CPTII,S$GLB,, | Performed by: PODIATRIST

## 2023-12-14 PROCEDURE — 3078F DIAST BP <80 MM HG: CPT | Mod: CPTII,S$GLB,, | Performed by: PODIATRIST

## 2023-12-14 PROCEDURE — 99999 PR PBB SHADOW E&M-EST. PATIENT-LVL IV: CPT | Mod: PBBFAC,,, | Performed by: PODIATRIST

## 2023-12-14 NOTE — PROGRESS NOTES
Subjective:      Patient ID: Ayah Ceron is a 62 y.o. female.    Chief Complaint: Hammer Toe (Bilateral hammertoes)    Ayah is a 62 y.o. female who presents to the podiatry clinic  with complaint of  bilateral foot pain. Onset of the symptoms was several months ago. Precipitating event: none known. Current symptoms include: ability to bear weight, but with some pain. Aggravating factors: any weight bearing. Symptoms have progressed to a point and plateaued. Patient has had no prior foot problems. Evaluation to date: none. Treatment to date: none. Patients rates pain 4/10 on pain scale.    Review of Systems   Constitutional: Negative for chills, fever and malaise/fatigue.   HENT:  Negative for hearing loss.    Cardiovascular:  Negative for claudication.   Respiratory:  Negative for shortness of breath.    Skin:  Negative for flushing and rash.   Musculoskeletal:  Negative for joint pain and myalgias.   Neurological:  Negative for loss of balance, numbness, paresthesias and sensory change.   Psychiatric/Behavioral:  Negative for altered mental status.            Objective:      Physical Exam  Vitals reviewed.   Cardiovascular:      Pulses:           Dorsalis pedis pulses are 2+ on the right side and 2+ on the left side.        Posterior tibial pulses are 2+ on the right side and 2+ on the left side.      Comments: No edema noted b/L  Musculoskeletal:      Comments:   Hammertoes noted, digits 2-5 b/L    with adductovarus rotation of b/L fifth digit.         Feet:      Right foot:      Protective Sensation: 5 sites tested.  5 sites sensed.      Left foot:      Protective Sensation: 5 sites tested.  5 sites sensed.   Skin:     General: Skin is warm.      Capillary Refill: Capillary refill takes 2 to 3 seconds.      Comments: Normal skin tugor noted.   No open lesion noted b/L  Skin temp is warm to warm from proximal to distal b/L.  Webspaces clean, dry, and intact     Neurological:      Mental Status: She is alert.       Comments: Gross sensation intact b/L               Assessment:       Encounter Diagnoses   Name Primary?    Pain of foot, unspecified laterality     Hammer toes of both feet Yes         Plan:       Ayah was seen today for hammer toe.    Diagnoses and all orders for this visit:    Hammer toes of both feet    Pain of foot, unspecified laterality  -     Ambulatory referral/consult to Podiatry      I counseled the patient on her conditions, their implications and medical management.      Pt advised she does have hammertoes on both feet  Conservative and surgcial measures discussed with pt  Pt opted for conservative at the moment  Toe sleeves dispensed to be worn in shoes  Shoe modification advised for the pt. Pt was advised to obtain shoes will accommodate foot deformities.   Call or return to clinic prn if these symptoms worsen or fail to improve as anticipated.    .

## 2024-01-08 ENCOUNTER — OFFICE VISIT (OUTPATIENT)
Dept: INTERNAL MEDICINE | Facility: CLINIC | Age: 64
End: 2024-01-08
Payer: COMMERCIAL

## 2024-01-08 DIAGNOSIS — G47.00 INSOMNIA, UNSPECIFIED TYPE: Primary | ICD-10-CM

## 2024-01-08 PROCEDURE — 1160F RVW MEDS BY RX/DR IN RCRD: CPT | Mod: CPTII,95,, | Performed by: FAMILY MEDICINE

## 2024-01-08 PROCEDURE — 1159F MED LIST DOCD IN RCRD: CPT | Mod: CPTII,95,, | Performed by: FAMILY MEDICINE

## 2024-01-08 PROCEDURE — 99214 OFFICE O/P EST MOD 30 MIN: CPT | Mod: 95,,, | Performed by: FAMILY MEDICINE

## 2024-01-08 RX ORDER — TRAZODONE HYDROCHLORIDE 50 MG/1
50 TABLET ORAL NIGHTLY PRN
Qty: 30 TABLET | Refills: 5 | Status: SHIPPED | OUTPATIENT
Start: 2024-01-08 | End: 2025-01-07

## 2024-01-08 NOTE — PROGRESS NOTES
Subjective:       Patient ID: Ayah Ceron is a 63 y.o. female.    Chief Complaint: No chief complaint on file.    HPI  The patient location is: [place]  The chief complaint leading to consultation is: meds    Visit type: audiovisual    Face to Face time with patient: 10 min   30 minutes of total time spent on the encounter, which includes face to face time and non-face to face time preparing to see the patient (eg, review of tests), Obtaining and/or reviewing separately obtained history, Documenting clinical information in the electronic or other health record, Independently interpreting results (not separately reported) and communicating results to the patient/family/caregiver, or Care coordination (not separately reported).     Each patient to whom he or she provides medical services by telemedicine is:  (1) informed of the relationship between the physician and patient and the respective role of any other health care provider with respect to management of the patient; and (2) notified that he or she may decline to receive medical services by telemedicine and may withdraw from such care at any time.    Notes:     Ayah Ceron is a 63 y.o. female PMHx HLD, VINOD for virtual.    F/u med. Xanax effective at calming down racing thoughts but not helping necessarily with staying asleep  Daughter takes trazodone with relief of similar symptoms.      #Cards: HLD     #Hep: Fatty liver, Elev LFTs  - was following with Dr. Vasquez,  2017  - consider f/u w/ hep in future     #Neuro: Atypical migraine  - est w/ Dr. Merida,  3/2022     #Sleep med: VINOD on cpap  - est w/ Dr. Muniz,  12/2023    #Podiatry: Hammer toes Bl  - est w/ Dr. Henderson,  12/2023     #BMI 26: S/p Gastric bypass in 2011  - sees bariatric team ~q2yr  - Dr. Ortiz was ordering full panel of labs yearly    Review of Systems   Constitutional:  Negative for activity change and unexpected weight change.   HENT:  Negative for hearing loss, rhinorrhea and trouble  swallowing.    Eyes:  Negative for discharge and visual disturbance.   Respiratory:  Negative for chest tightness and wheezing.    Cardiovascular:  Negative for chest pain and palpitations.   Gastrointestinal:  Negative for blood in stool, constipation, diarrhea and vomiting.   Endocrine: Negative for polydipsia and polyuria.   Genitourinary:  Negative for difficulty urinating, dysuria, hematuria and menstrual problem.   Musculoskeletal:  Negative for arthralgias, joint swelling and neck pain.   Neurological:  Negative for weakness and headaches.   Psychiatric/Behavioral:  Negative for confusion and dysphoric mood.          Past Medical History:   Diagnosis Date    Anxiety     situational    Deviated septum     Fibroid     Hordeolum externum of right lower eyelid     Hyperlipemia     Liver disease     fatty liver    PONV (postoperative nausea and vomiting)     Sleep apnea     cpap pressure 8    Steatosis         Prior to Admission medications    Medication Sig Start Date End Date Taking? Authorizing Provider   ALPRAZolam (XANAX) 0.25 MG tablet Take 1 tablet (0.25 mg total) by mouth nightly as needed for Anxiety. 11/27/23 12/28/23  Cruz Renteria MD   biotin 1 mg tablet Take 1,000 mcg by mouth 3 (three) times daily.    Provider, Historical   calcium citrate-vitamin D3 500 mg calcium -400 unit Chew Take by mouth. 2 tablets once per day    Provider, Historical   coenzyme Q10 100 mg capsule Take 100 mg by mouth once daily.    Provider, Historical   conjugated estrogens (PREMARIN) vaginal cream Place 0.5 g vaginally twice a week. 12/6/18   Clarice Gutierrez MD   cyanocobalamin 500 MCG tablet Take 500 mcg by mouth once daily. sublingual    Provider, Historical   diclofenac sodium (VOLTAREN) 1 % Gel Apply 2 g topically 4 (four) times daily. Apply to affected area 12/6/21   Bright Real MD   docusate sodium (COLACE) 100 MG capsule Take 100 mg by mouth 2 (two) times daily.    Provider, Historical   doxycycline  monohydrate 100 mg Tab Take 1 tablet (100 mg total) by mouth daily 7/7/20   Neda Clark PA-C   erythromycin (ROMYCIN) ophthalmic ointment Place a 1/2 inch ribbon of ointment into the lower eyelid 11/14/22   Cruz Renteria MD   FERROUS FUMARATE/VIT BCOMP&C (SUPER B COMPLEX ORAL) Take by mouth.    Provider, Historical   ivermectin (SOOLANTRA) 1 % Crea Apply to affected area of face every night and wash off in morning 7/9/20   Neda Clark PA-C   ivermectin (STROMECTOL) 3 mg Tab Take 6 pills together then repeat in 2 weeks 7/7/20   Neda Clark PA-C   loratadine (CLARITIN) 10 mg tablet Take 1 tablet (10 mg total) by mouth once daily. 9/29/23 9/28/24  Cruz Renteria MD   MAGNESIUM OXIDE (MAG-OXIDE ORAL) Take by mouth. Try to get 400-500 a day    Provider, Historical   metronidazole 1% (METROGEL) 1 % Gel Apply topically once daily. 11/2/20 11/2/21  Elena Ortiz MD   multivitamin capsule Take 1 capsule by mouth 2 (two) times daily.    Provider, Historical   riboflavin, vitamin B2, (VITAMIN B-2) 100 mg Tab tablet Take 100 mg by mouth once daily.    Provider, Historical   sulfacetamide sodium-sulfur (SULFACLEANSE 8-4) 8-4 % Susp Wash face qd - bid 7/7/20   Neda Clark PA-C        Past medical history, surgical history, and family medical history reviewed and updated as appropriate.    Medications and allergies reviewed.     Objective:          There were no vitals filed for this visit.  There is no height or weight on file to calculate BMI.  Physical Exam  Constitutional:       General: She is not in acute distress.     Appearance: Normal appearance.   Eyes:      Extraocular Movements: Extraocular movements intact.   Pulmonary:      Effort: Pulmonary effort is normal. No respiratory distress.   Neurological:      Mental Status: She is alert and oriented to person, place, and time.   Psychiatric:         Mood and Affect: Mood normal.         Behavior: Behavior normal.         Lab Results    Component Value Date    WBC 3.69 (L) 10/31/2023    HGB 14.0 10/31/2023    HCT 42.5 10/31/2023     10/31/2023    CHOL 216 (H) 10/31/2023    TRIG 72 10/31/2023    HDL 60 10/31/2023    ALT 47 (H) 10/31/2023    AST 33 10/31/2023     10/31/2023    K 4.1 10/31/2023     10/31/2023    CREATININE 0.6 10/31/2023    BUN 11 10/31/2023    CO2 26 10/31/2023    TSH 3.399 10/31/2023    INR 1.1 10/17/2012    HGBA1C 5.2 10/31/2023       Assessment:       1. Insomnia, unspecified type          Plan:   1. Insomnia, unspecified type  Overview:  Trouble staying asleep - try trazodone 1/2024  - ok to use xanax prn racing thoughts; recommend not both at same time    Orders:  -     traZODone (DESYREL) 50 MG tablet; Take 1 tablet (50 mg total) by mouth nightly as needed for Insomnia.  Dispense: 30 tablet; Refill: 5        Health maintenance reviewed with patient.     No follow-ups on file.    Cruz Renteria MD  Family Medicine / Primary Care  [facility]  1/8/2024

## 2024-01-24 ENCOUNTER — HOSPITAL ENCOUNTER (OUTPATIENT)
Dept: RADIOLOGY | Facility: CLINIC | Age: 64
Discharge: HOME | End: 2024-01-24
Attending: OBSTETRICS & GYNECOLOGY
Payer: COMMERCIAL

## 2024-01-24 DIAGNOSIS — Z78.0 ENCOUNTER FOR OSTEOPOROSIS SCREENING IN ASYMPTOMATIC POSTMENOPAUSAL PATIENT: ICD-10-CM

## 2024-01-24 DIAGNOSIS — Z13.820 ENCOUNTER FOR OSTEOPOROSIS SCREENING IN ASYMPTOMATIC POSTMENOPAUSAL PATIENT: ICD-10-CM

## 2024-01-24 PROCEDURE — 77080 DXA BONE DENSITY AXIAL: CPT | Mod: 26,,, | Performed by: INTERNAL MEDICINE

## 2024-01-24 PROCEDURE — 77080 DXA BONE DENSITY AXIAL: CPT | Mod: TC

## 2024-01-29 DIAGNOSIS — M81.0 POST-MENOPAUSAL OSTEOPOROSIS: Primary | ICD-10-CM

## 2024-01-31 ENCOUNTER — TELEPHONE (OUTPATIENT)
Dept: OBSTETRICS AND GYNECOLOGY | Facility: CLINIC | Age: 64
End: 2024-01-31
Payer: COMMERCIAL

## 2024-01-31 ENCOUNTER — TELEPHONE (OUTPATIENT)
Dept: GYNECOLOGIC ONCOLOGY | Facility: CLINIC | Age: 64
End: 2024-01-31
Payer: COMMERCIAL

## 2024-01-31 NOTE — TELEPHONE ENCOUNTER
Called and confirmed labs with patient. Scheduled patient at her earliest convenience. Patient expressed understanding.

## 2024-01-31 NOTE — TELEPHONE ENCOUNTER
----- Message from Shani Muniz MD sent at 12/12/2023  1:55 PM CST -----  Please schedule for genetic screening.  AT

## 2024-02-01 ENCOUNTER — LAB VISIT (OUTPATIENT)
Dept: LAB | Facility: HOSPITAL | Age: 64
End: 2024-02-01
Attending: OBSTETRICS & GYNECOLOGY
Payer: COMMERCIAL

## 2024-02-01 DIAGNOSIS — M81.0 POST-MENOPAUSAL OSTEOPOROSIS: ICD-10-CM

## 2024-02-01 LAB
25(OH)D3+25(OH)D2 SERPL-MCNC: 50 NG/ML (ref 30–96)
ALBUMIN SERPL BCP-MCNC: 3.6 G/DL (ref 3.5–5.2)
ALP SERPL-CCNC: 98 U/L (ref 55–135)
ALT SERPL W/O P-5'-P-CCNC: 43 U/L (ref 10–44)
ANION GAP SERPL CALC-SCNC: 6 MMOL/L (ref 8–16)
AST SERPL-CCNC: 26 U/L (ref 10–40)
BILIRUB SERPL-MCNC: 0.4 MG/DL (ref 0.1–1)
BUN SERPL-MCNC: 15 MG/DL (ref 8–23)
CALCIUM SERPL-MCNC: 9.6 MG/DL (ref 8.7–10.5)
CHLORIDE SERPL-SCNC: 108 MMOL/L (ref 95–110)
CO2 SERPL-SCNC: 30 MMOL/L (ref 23–29)
CREAT SERPL-MCNC: 0.7 MG/DL (ref 0.5–1.4)
EST. GFR  (NO RACE VARIABLE): >60 ML/MIN/1.73 M^2
GLUCOSE SERPL-MCNC: 86 MG/DL (ref 70–110)
POTASSIUM SERPL-SCNC: 3.9 MMOL/L (ref 3.5–5.1)
PROT SERPL-MCNC: 6.3 G/DL (ref 6–8.4)
SODIUM SERPL-SCNC: 144 MMOL/L (ref 136–145)

## 2024-02-01 PROCEDURE — 36415 COLL VENOUS BLD VENIPUNCTURE: CPT | Performed by: OBSTETRICS & GYNECOLOGY

## 2024-02-01 PROCEDURE — 82306 VITAMIN D 25 HYDROXY: CPT | Performed by: OBSTETRICS & GYNECOLOGY

## 2024-02-01 PROCEDURE — 80053 COMPREHEN METABOLIC PANEL: CPT | Performed by: OBSTETRICS & GYNECOLOGY

## 2024-02-07 ENCOUNTER — PATIENT MESSAGE (OUTPATIENT)
Dept: OBSTETRICS AND GYNECOLOGY | Facility: CLINIC | Age: 64
End: 2024-02-07
Payer: COMMERCIAL

## 2024-02-07 ENCOUNTER — CLINICAL SUPPORT (OUTPATIENT)
Dept: OTHER | Facility: CLINIC | Age: 64
End: 2024-02-07
Payer: COMMERCIAL

## 2024-02-07 DIAGNOSIS — Z00.8 ENCOUNTER FOR OTHER GENERAL EXAMINATION: ICD-10-CM

## 2024-02-08 VITALS
SYSTOLIC BLOOD PRESSURE: 110 MMHG | HEIGHT: 64 IN | DIASTOLIC BLOOD PRESSURE: 74 MMHG | BODY MASS INDEX: 25.61 KG/M2 | WEIGHT: 150 LBS

## 2024-02-08 LAB
HDLC SERPL-MCNC: 66 MG/DL
POC CHOLESTEROL, LDL (DOCK): 159 MG/DL
POC CHOLESTEROL, TOTAL: 240 MG/DL
POC GLUCOSE, FASTING: 83 MG/DL (ref 60–110)
TRIGL SERPL-MCNC: 89 MG/DL

## 2024-02-16 ENCOUNTER — PATIENT MESSAGE (OUTPATIENT)
Dept: OBSTETRICS AND GYNECOLOGY | Facility: CLINIC | Age: 64
End: 2024-02-16
Payer: COMMERCIAL

## 2024-02-19 ENCOUNTER — TELEPHONE (OUTPATIENT)
Dept: OBSTETRICS AND GYNECOLOGY | Facility: CLINIC | Age: 64
End: 2024-02-19
Payer: COMMERCIAL

## 2024-02-19 NOTE — TELEPHONE ENCOUNTER
----- Message from Keyakayy Dunlapling sent at 2/19/2024  4:48 PM CST -----  Regarding: Patient Advice  Type: Patient Call Back    Who called: p/t     What is the request in detail: p/t wants to know why does she need more labs b/c she just had some 02/01. Please call to assist     Can the clinic reply by [facility]? Yes     Would the patient rather a call back or a response via [facility]? Call back     Best call back number: [phone]    Additional Information:

## 2024-02-19 NOTE — TELEPHONE ENCOUNTER
Called to get patient scheduled for labs. Serafin declined stating she just had them done 10.23.24. will confirm with provider that these need to be repeated and will get back with the patient as soon as possible.     Patient expressed understanding.

## 2024-02-20 ENCOUNTER — PATIENT MESSAGE (OUTPATIENT)
Dept: OBSTETRICS AND GYNECOLOGY | Facility: CLINIC | Age: 64
End: 2024-02-20
Payer: COMMERCIAL

## 2024-03-07 ENCOUNTER — OFFICE VISIT (OUTPATIENT)
Dept: OBSTETRICS AND GYNECOLOGY | Facility: CLINIC | Age: 64
End: 2024-03-07
Payer: COMMERCIAL

## 2024-03-07 DIAGNOSIS — M81.0 POST-MENOPAUSAL OSTEOPOROSIS: Primary | ICD-10-CM

## 2024-03-07 PROCEDURE — 99215 OFFICE O/P EST HI 40 MIN: CPT | Mod: 95,,, | Performed by: OBSTETRICS & GYNECOLOGY

## 2024-03-07 RX ORDER — ALENDRONATE SODIUM 70 MG/1
70 TABLET ORAL
Qty: 12 TABLET | Refills: 3 | Status: SHIPPED | OUTPATIENT
Start: 2024-03-07 | End: 2025-03-07

## 2024-03-07 NOTE — PROGRESS NOTES
The patient location is: work  The chief complaint leading to consultation is: results    Visit type: audiovisual    Face to Face time with patient: 29 minutes  46 minutes of total time spent on the encounter, which includes face to face time and non-face to face time preparing to see the patient (eg, review of tests), Obtaining and/or reviewing separately obtained history, Documenting clinical information in the electronic or other health record, Independently interpreting results (not separately reported) and communicating results to the patient/family/caregiver, or Care coordination (not separately reported).         Each patient to whom he or she provides medical services by telemedicine is:  (1) informed of the relationship between the physician and patient and the respective role of any other health care provider with respect to management of the patient; and (2) notified that he or she may decline to receive medical services by telemedicine and may withdraw from such care at any time.    Notes:       Chief Complaint: U/S Results     HPI:      Ayah Ceron is a 63 y.o.  who presents today for follow up of DEXA results.  LMP: No LMP recorded. Patient has had a hysterectomy.  No other issues, problems, or complaints.     PSH significant for gastric bypass.     OB History          6    Para   3    Term   2       1    AB   3    Living   3         SAB   3    IAB        Ectopic        Multiple        Live Births   3           Obstetric Comments   No abnormal pap  No STDs             Past Medical History:   Diagnosis Date    Anxiety     situational    Deviated septum     Fibroid     Hordeolum externum of right lower eyelid     Hyperlipemia     Liver disease     fatty liver    PONV (postoperative nausea and vomiting)     Sleep apnea     cpap pressure 8    Steatosis      Past Surgical History:   Procedure Laterality Date    CARPAL TUNNEL RELEASE      COLONOSCOPY N/A 2022    Procedure:  COLONOSCOPY;  Surgeon: Cruz Stahl MD;  Location: [facility] ([address] [place] [address]);  Service: Endoscopy;  Laterality: N/A;  old order , new order placed  fully vaccinated, instructions sent to [facility]  22 - Pt confirmed 0700 arrival time - ERW    DILATION AND CURETTAGE OF UTERUS      GASTRIC BYPASS  2011    HEMORRHOID SURGERY      HYSTERECTOMY, VAGINAL, WITH SALPINGO-OOPHORECTOMY      uterine fibroids    LASIK      10/26/03    TONSILLECTOMY, ADENOIDECTOMY      TUBAL LIGATION      WISDOM TOOTH EXTRACTION       Social History     Socioeconomic History    Marital status:    Occupational History    Occupation: RN     Employer: [employer]   Tobacco Use    Smoking status: Never    Smokeless tobacco: Never   Substance and Sexual Activity    Alcohol use: No    Drug use: No    Sexual activity: Yes     Partners: Male     Birth control/protection: Surgical   Other Topics Concern    Are you pregnant or think you may be? No    Breast-feeding No     Social Determinants of Health     Financial Resource Strain: Low Risk  (2023)    Overall Financial Resource Strain (CARDIA)     Difficulty of Paying Living Expenses: Not hard at all   Food Insecurity: No Food Insecurity (2023)    Hunger Vital Sign     Worried About Running Out of Food in the Last Year: Never true     Ran Out of Food in the Last Year: Never true   Transportation Needs: No Transportation Needs (2023)    PRAPARE - Transportation     Lack of Transportation (Medical): No     Lack of Transportation (Non-Medical): No   Physical Activity: Unknown (2023)    Exercise Vital Sign     Days of Exercise per Week: 2 days   Stress: No Stress Concern Present (2023)    [employer] [place] [employer] - Occupational Stress Questionnaire     Feeling of Stress : Only a little   Social Connections: Unknown (2023)    Social Connection and Isolation Panel [NHANES]     Frequency of Communication with  Friends and Family: More than three times a week     Frequency of Social Gatherings with Friends and Family: Twice a week     Active Member of Clubs or Organizations: Yes     Marital Status:    Housing Stability: Low Risk  (12/14/2023)    Housing Stability Vital Sign     Unable to Pay for Housing in the Last Year: No     Number of Places Lived in the Last Year: 1     Unstable Housing in the Last Year: No     Family History   Problem Relation Age of Onset    Hypothyroidism Mother         had thyroidectomy    Hypertension Father     Arthritis Father     Hyperlipidemia Father     Breast cancer Paternal Grandmother 65    Eczema Daughter     Acne Daughter     Acne Son     Breast cancer Paternal Aunt 35    Ovarian cancer Maternal Cousin 45    Psoriasis Neg Hx     Lupus Neg Hx     Cervical cancer Neg Hx     Endometrial cancer Neg Hx     Vaginal cancer Neg Hx        Current Outpatient Medications:     alendronate (FOSAMAX) 70 MG tablet, Take 1 tablet (70 mg total) by mouth every 7 days., Disp: 12 tablet, Rfl: 3    ALPRAZolam (XANAX) 0.25 MG tablet, Take 1 tablet (0.25 mg total) by mouth nightly as needed for Anxiety., Disp: 30 tablet, Rfl: 0    biotin 1 mg tablet, Take 1,000 mcg by mouth 3 (three) times daily., Disp: , Rfl:     calcium citrate-vitamin D3 500 mg calcium -400 unit Chew, Take by mouth. 2 tablets once per day, Disp: , Rfl:     coenzyme Q10 100 mg capsule, Take 100 mg by mouth once daily., Disp: , Rfl:     conjugated estrogens (PREMARIN) vaginal cream, Place 0.5 g vaginally twice a week., Disp: 30 g, Rfl: 11    cyanocobalamin 500 MCG tablet, Take 500 mcg by mouth once daily. sublingual, Disp: , Rfl:     diclofenac sodium (VOLTAREN) 1 % Gel, Apply 2 g topically 4 (four) times daily. Apply to affected area, Disp: 150 g, Rfl: 0    docusate sodium (COLACE) 100 MG capsule, Take 100 mg by mouth 2 (two) times daily., Disp: , Rfl:     doxycycline monohydrate 100 mg Tab, Take 1 tablet (100 mg total) by mouth  daily, Disp: 30 tablet, Rfl: 2    erythromycin (ROMYCIN) ophthalmic ointment, Place a 1/2 inch ribbon of ointment into the lower eyelid, Disp: 3.5 g, Rfl: 1    FERROUS FUMARATE/VIT BCOMP&C (SUPER B COMPLEX ORAL), Take by mouth., Disp: , Rfl:     ivermectin (SOOLANTRA) 1 % Crea, Apply to affected area of face every night and wash off in morning, Disp: 45 g, Rfl: 3    ivermectin (STROMECTOL) 3 mg Tab, Take 6 pills together then repeat in 2 weeks, Disp: 12 tablet, Rfl: 0    loratadine (CLARITIN) 10 mg tablet, Take 1 tablet (10 mg total) by mouth once daily., Disp: 90 tablet, Rfl: 3    MAGNESIUM OXIDE (MAG-OXIDE ORAL), Take by mouth. Try to get 400-500 a day, Disp: , Rfl:     metronidazole 1% (METROGEL) 1 % Gel, Apply topically once daily., Disp: 60 g, Rfl: 1    multivitamin capsule, Take 1 capsule by mouth 2 (two) times daily., Disp: , Rfl:     riboflavin, vitamin B2, (VITAMIN B-2) 100 mg Tab tablet, Take 100 mg by mouth once daily., Disp: , Rfl:     sulfacetamide sodium-sulfur (SULFACLEANSE 8-4) 8-4 % Susp, Wash face qd - bid, Disp: 1 Bottle, Rfl: 5    traZODone (DESYREL) 50 MG tablet, Take 1 tablet (50 mg total) by mouth nightly as needed for Insomnia., Disp: 30 tablet, Rfl: 5    ROS:     GENERAL: Denies unintentional weight gain or weight loss. Feeling well overall.   SKIN: Denies rash or lesions.   HEENT: Denies headaches, or vision changes.   ABDOMEN: Denies abdominal pain, constipation, diarrhea, nausea, vomiting, change in appetite.  URINARY: Denies frequency, dysuria, hematuria.  NEUROLOGIC: Denies syncope or weakness.   PSYCHIATRIC: Denies depression, anxiety or mood swings.    Physical Exam:      PHYSICAL EXAM:  There were no vitals taken for this visit.  There is no height or weight on file to calculate BMI.     APPEARANCE: Well nourished, well developed, in no acute distress.  PSYCH: Appropriate mood and affect.  SKIN: No acne or hirsutism.    DXA Bone Density Axial Skeleton 1 or more sites  Narrative:  EXAMINATION:  DXA BONE DENSITY AXIAL SKELETON 1 OR MORE SITES    CLINICAL HISTORY:  Encounter for screening for osteoporosis.  62 y/o female with a history of fractured right foot at 34 y/o.  She had surgical menopause at 45 y/o.  She is taking Calcium and Vit D supplements.  She exercise regularly and does not smoke.    TECHNIQUE:  DXA specification: [facility] [employer] Horizon A ([id number])    Bone Mineral Density scanning was performed over the hip and lumbar spine.    Review of the images confirms satisfactory positioning and technique.    COMPARISON:  Comparison study done on 09/28/2007.    Lumbar spine:     BMD 1.004 g/cm2 and T-score -0.4.    Total Hip:           BMD 1.067 g/cm2 and T-score 1.0.    Distal 1/3 radius: Not applicable    FINDINGS:  Lumbar spine (L1-L4):               BMD is 0.767 g/cm2, T-score is -2.5, and Z-score is -1.0.    Total hip:                                BMD is 0.870- g/cm2, T-score is 0.6, and Z-score is 0.5.    Femoral neck:                          BMD is 0.661 g/cm2, T-score is -1.7, and Z-score is -0.3.    Distal 1/3 radius:                      Not applicable    FRAX:    8.8% risk of a major osteoporotic fracture in the next 10 years.    0.9% risk of hip fracture in the next 10 years.  Impression: *Osteoporosis based on T-score of -2.5.  *Fracture risk is high.  *Compared with previous DXA, BMD at the lumbar spine has declined by -23.6, and BMD at the total hip has declined by -18.4%.    RECOMMENDATIONS:  *Daily calcium intake 9575-8639 mg, dietary sources preferred; Vitamin D 4078-2558 IU daily.  *Weight bearing exercise and fall precautions.  *Recommend medical therapy for osteoporosis with high risk for fracture. Consider bisphosphonates (alendronate, risedronate, zoledronic acid), or denosumab.  *Repeat BMD in 2 years.    EXPLANATION OF RESULTS:  T-score compares these results to the average bone density of a 20-29 year-old of the same gender.    Z-score compares this  result to the average bone density to people of the same age, gender, and race.    The amounts indicate the number of standard deviations above or below the mean.    * Osteoporosis is generally defined as having a T-score between less than or equal to -2.5.    * Low bone mass (osteopenia) is generally defined as having a T-score between -1.0 and -2.5.    * The normal range is generally defined as having a T-score greater than or equal to -1.0.    * Calculated FRAX scores for fracture risk prediction may not be accurate in the setting of certain clinical factors such as pharmacologic therapy for osteoporosis, prior fragility fractures, high dose glucocorticoid use.    Electronically signed by: Rocio Paris MD  Date:    01/24/2024  Time:    13:22    Lab Results   Component Value Date    TSH 3.399 10/31/2023     Lab Results   Component Value Date    WBC 3.69 (L) 10/31/2023    HGB 14.0 10/31/2023    HCT 42.5 10/31/2023    MCV 92 10/31/2023     10/31/2023       CMP  Sodium   Date Value Ref Range Status   02/01/2024 144 136 - 145 mmol/L Final     Potassium   Date Value Ref Range Status   02/01/2024 3.9 3.5 - 5.1 mmol/L Final     Chloride   Date Value Ref Range Status   02/01/2024 108 95 - 110 mmol/L Final     CO2   Date Value Ref Range Status   02/01/2024 30 (H) 23 - 29 mmol/L Final     Glucose   Date Value Ref Range Status   02/01/2024 86 70 - 110 mg/dL Final     BUN   Date Value Ref Range Status   02/01/2024 15 8 - 23 mg/dL Final     Creatinine   Date Value Ref Range Status   02/01/2024 0.7 0.5 - 1.4 mg/dL Final     Calcium   Date Value Ref Range Status   02/01/2024 9.6 8.7 - 10.5 mg/dL Final     Total Protein   Date Value Ref Range Status   02/01/2024 6.3 6.0 - 8.4 g/dL Final     Albumin   Date Value Ref Range Status   02/01/2024 3.6 3.5 - 5.2 g/dL Final     Total Bilirubin   Date Value Ref Range Status   02/01/2024 0.4 0.1 - 1.0 mg/dL Final     Comment:     For infants and newborns, interpretation of  results should be based  on gestational age, weight and in agreement with clinical  observations.    Premature Infant recommended reference ranges:  Up to 24 hours.............<8.0 mg/dL  Up to 48 hours............<12.0 mg/dL  3-5 days..................<15.0 mg/dL  6-29 days.................<15.0 mg/dL       Alkaline Phosphatase   Date Value Ref Range Status   2024 98 55 - 135 U/L Final     AST   Date Value Ref Range Status   2024 26 10 - 40 U/L Final     ALT   Date Value Ref Range Status   2024 43 10 - 44 U/L Final     Anion Gap   Date Value Ref Range Status   2024 6 (L) 8 - 16 mmol/L Final     eGFR   Date Value Ref Range Status   2024 >60.0 >60 mL/min/1.73 m^2 Final                Component Ref Range & Units 1 mo ago 2 yr ago 3 yr ago 4 yr ago 5 yr ago 6 yr ago 7 yr ago   Vit D, 25-Hydroxy 30 - 96 ng/mL 50 54 CM 45 CM 53 CM 53 CM 48 CM 43 CM   Comment: Vitamin D deficiency.........<10 ng/mL                              Vitamin D insufficiency......10-29 ng/mL      Vitamin D sufficiency........> or equal to 30 ng/mL  Vitamin D toxicity............>100 ng/mL   Resulting Agency  [facility]              Specimen Collected: 24 07:10 CST Last Resulted: 24 08:55 CST             Assessment/Plan:     63 y.o.     Post-menopausal osteoporosis  -     alendronate (FOSAMAX) 70 MG tablet; Take 1 tablet (70 mg total) by mouth every 7 days.  Dispense: 12 tablet; Refill: 3          Counselin.  Osteoporosis: DEXA results reviewed with patient.  Discuss risk of fracture with the patient and treatement options, including bisphosphonates, Prolia, and Reclast.  Discussed risks and benefits, including black box warning for osteonecrosis of jaw.  Patient declines IV or SC administration and desires trial of Fosamax.  Discussed concern regarding malabsorption due to prior gastric bypass. Reviewed instructions including taking 30 minutes prior to first  food/drink and waiting at least 30 minutes before laying down.  Patient denies history of GERD or esophagitis. Advised to continue regular exercise and use of Calcium and vitamin D supplements. Reviewed lifestyle modifications, including removing area rugs and loose cords and ensuring lighting at night to limit falls.  2.  Repeat DEXA in 1-2 years.  3. Follow up with PCP for other health maintenance.  4.  RTC for annual exam or sooner if needed.    Use of the [employer] Patient Portal discussed and encouraged during today's visit.         Shani Muniz MD  [facility]  03/07/2024

## 2024-09-20 ENCOUNTER — PATIENT OUTREACH (OUTPATIENT)
Dept: ADMINISTRATIVE | Facility: HOSPITAL | Age: 64
End: 2024-09-20
Payer: COMMERCIAL

## 2024-09-20 NOTE — PROGRESS NOTES
Health Maintenance Due   Topic Date Due    Pneumococcal Vaccines (Age 0-64) (1 of 2 - PCV) Never done    RSV Vaccine (Age 60+ and Pregnant patients) (1 - 1-dose 60+ series) Never done    Influenza Vaccine (1) 09/01/2024    COVID-19 Vaccine (4 - 2023-24 season) 09/01/2024    Mammogram  12/07/2024     Triggered LINKS and reconciled immunizations. Updated Care Everywhere. Pt responded to campaigns outreach asking to schedule mammogram- pt has mammogram scheduled for 12/10/2024. Chart review completed.

## 2024-09-21 DIAGNOSIS — G47.00 INSOMNIA, UNSPECIFIED TYPE: ICD-10-CM

## 2024-09-22 RX ORDER — TRAZODONE HYDROCHLORIDE 50 MG/1
50 TABLET ORAL NIGHTLY PRN
Qty: 90 TABLET | Refills: 1 | Status: SHIPPED | OUTPATIENT
Start: 2024-09-22

## 2024-09-22 RX ORDER — LORATADINE 10 MG/1
10 TABLET ORAL DAILY
Qty: 90 TABLET | Refills: 1 | Status: SHIPPED | OUTPATIENT
Start: 2024-09-22 | End: 2025-03-21

## 2024-09-22 NOTE — TELEPHONE ENCOUNTER
No care due was identified.  [facility] Embedded Care Due Messages. Reference number: [id number].   9/21/2024 10:58:20 PM CDT

## 2024-09-23 NOTE — TELEPHONE ENCOUNTER
Refill Decision Note   Ayah Ceron  is requesting a refill authorization.  Brief Assessment and Rationale for Refill:  Approve     Medication Therapy Plan:         Comments:     Note composed:8:21 PM 09/22/2024             Appointments     Last Visit   Visit date not found Cruz Renteria MD   Next Visit   Visit date not found Cruz Renteria MD

## 2024-11-14 ENCOUNTER — PATIENT MESSAGE (OUTPATIENT)
Dept: OTHER | Facility: OTHER | Age: 64
End: 2024-11-14
Payer: COMMERCIAL

## 2024-12-03 ENCOUNTER — TELEPHONE (OUTPATIENT)
Dept: INTERNAL MEDICINE | Facility: CLINIC | Age: 64
End: 2024-12-03
Payer: COMMERCIAL

## 2024-12-03 DIAGNOSIS — Z79.899 DRUG THERAPY: ICD-10-CM

## 2024-12-03 DIAGNOSIS — Z00.00 ANNUAL PHYSICAL EXAM: Primary | ICD-10-CM

## 2024-12-03 NOTE — TELEPHONE ENCOUNTER
----- Message from Med Assistant Mohr sent at 12/3/2024  2:57 PM CST -----  Contact: [phone]  Can you put lab orders in for patient to get done prior to appt  ----- Message -----  From: Rossi Carrillo  Sent: 12/3/2024   2:45 PM CST  To: Dexter Arroyo Staff    What orders is pt asking for?: annual lab order    Is there a future appointment with the provider?: no    When?:    Comments?: patient would like order to be place in the system for her to come any time.

## 2024-12-04 ENCOUNTER — LAB VISIT (OUTPATIENT)
Dept: LAB | Facility: HOSPITAL | Age: 64
End: 2024-12-04
Attending: OBSTETRICS & GYNECOLOGY
Payer: COMMERCIAL

## 2024-12-04 DIAGNOSIS — M81.0 POST-MENOPAUSAL OSTEOPOROSIS: ICD-10-CM

## 2024-12-04 DIAGNOSIS — Z00.00 ANNUAL PHYSICAL EXAM: ICD-10-CM

## 2024-12-04 DIAGNOSIS — Z79.899 DRUG THERAPY: ICD-10-CM

## 2024-12-04 LAB
ALBUMIN SERPL BCP-MCNC: 3.9 G/DL (ref 3.5–5.2)
ALP SERPL-CCNC: 70 U/L (ref 40–150)
ALT SERPL W/O P-5'-P-CCNC: 42 U/L (ref 10–44)
ANION GAP SERPL CALC-SCNC: 6 MMOL/L (ref 8–16)
AST SERPL-CCNC: 32 U/L (ref 10–40)
BASOPHILS # BLD AUTO: 0.07 K/UL (ref 0–0.2)
BASOPHILS NFR BLD: 1.7 % (ref 0–1.9)
BILIRUB SERPL-MCNC: 0.5 MG/DL (ref 0.1–1)
BUN SERPL-MCNC: 15 MG/DL (ref 8–23)
CALCIUM SERPL-MCNC: 9.6 MG/DL (ref 8.7–10.5)
CHLORIDE SERPL-SCNC: 105 MMOL/L (ref 95–110)
CHOLEST SERPL-MCNC: 229 MG/DL (ref 120–199)
CHOLEST/HDLC SERPL: 3.6 {RATIO} (ref 2–5)
CO2 SERPL-SCNC: 31 MMOL/L (ref 23–29)
CREAT SERPL-MCNC: 0.8 MG/DL (ref 0.5–1.4)
DIFFERENTIAL METHOD BLD: ABNORMAL
EOSINOPHIL # BLD AUTO: 0.3 K/UL (ref 0–0.5)
EOSINOPHIL NFR BLD: 7.3 % (ref 0–8)
ERYTHROCYTE [DISTWIDTH] IN BLOOD BY AUTOMATED COUNT: 12.8 % (ref 11.5–14.5)
ERYTHROCYTE [DISTWIDTH] IN BLOOD BY AUTOMATED COUNT: 12.8 % (ref 11.5–14.5)
EST. GFR  (NO RACE VARIABLE): >60 ML/MIN/1.73 M^2
ESTIMATED AVG GLUCOSE: 108 MG/DL (ref 68–131)
FERRITIN SERPL-MCNC: 63 NG/ML (ref 20–300)
GLUCOSE SERPL-MCNC: 89 MG/DL (ref 70–110)
HBA1C MFR BLD: 5.4 % (ref 4–5.6)
HCT VFR BLD AUTO: 43.4 % (ref 37–48.5)
HCT VFR BLD AUTO: 43.4 % (ref 37–48.5)
HDLC SERPL-MCNC: 64 MG/DL (ref 40–75)
HDLC SERPL: 27.9 % (ref 20–50)
HGB BLD-MCNC: 14.8 G/DL (ref 12–16)
HGB BLD-MCNC: 14.8 G/DL (ref 12–16)
IMM GRANULOCYTES # BLD AUTO: 0.02 K/UL (ref 0–0.04)
IMM GRANULOCYTES NFR BLD AUTO: 0.5 % (ref 0–0.5)
IRON SERPL-MCNC: 112 UG/DL (ref 30–160)
LDLC SERPL CALC-MCNC: 148.2 MG/DL (ref 63–159)
LYMPHOCYTES # BLD AUTO: 1.5 K/UL (ref 1–4.8)
LYMPHOCYTES NFR BLD: 36.2 % (ref 18–48)
MAGNESIUM SERPL-MCNC: 2.3 MG/DL (ref 1.6–2.6)
MCH RBC QN AUTO: 31.3 PG (ref 27–31)
MCH RBC QN AUTO: 31.3 PG (ref 27–31)
MCHC RBC AUTO-ENTMCNC: 34.1 G/DL (ref 32–36)
MCHC RBC AUTO-ENTMCNC: 34.1 G/DL (ref 32–36)
MCV RBC AUTO: 92 FL (ref 82–98)
MCV RBC AUTO: 92 FL (ref 82–98)
MONOCYTES # BLD AUTO: 0.5 K/UL (ref 0.3–1)
MONOCYTES NFR BLD: 10.9 % (ref 4–15)
NEUTROPHILS # BLD AUTO: 1.8 K/UL (ref 1.8–7.7)
NEUTROPHILS NFR BLD: 43.4 % (ref 38–73)
NONHDLC SERPL-MCNC: 165 MG/DL
NRBC BLD-RTO: 0 /100 WBC
PLATELET # BLD AUTO: 297 K/UL (ref 150–450)
PLATELET # BLD AUTO: 297 K/UL (ref 150–450)
PMV BLD AUTO: 10.2 FL (ref 9.2–12.9)
PMV BLD AUTO: 10.2 FL (ref 9.2–12.9)
POTASSIUM SERPL-SCNC: 4.2 MMOL/L (ref 3.5–5.1)
PROT SERPL-MCNC: 6.5 G/DL (ref 6–8.4)
RBC # BLD AUTO: 4.73 M/UL (ref 4–5.4)
RBC # BLD AUTO: 4.73 M/UL (ref 4–5.4)
SATURATED IRON: 33 % (ref 20–50)
SODIUM SERPL-SCNC: 142 MMOL/L (ref 136–145)
TOTAL IRON BINDING CAPACITY: 340 UG/DL (ref 250–450)
TRANSFERRIN SERPL-MCNC: 230 MG/DL (ref 200–375)
TRIGL SERPL-MCNC: 84 MG/DL (ref 30–150)
TSH SERPL DL<=0.005 MIU/L-ACNC: 3.5 UIU/ML (ref 0.4–4)
TSH SERPL DL<=0.005 MIU/L-ACNC: 3.5 UIU/ML (ref 0.4–4)
VIT B12 SERPL-MCNC: 1455 PG/ML (ref 210–950)
WBC # BLD AUTO: 4.23 K/UL (ref 3.9–12.7)
WBC # BLD AUTO: 4.23 K/UL (ref 3.9–12.7)

## 2024-12-04 PROCEDURE — 82728 ASSAY OF FERRITIN: CPT | Performed by: FAMILY MEDICINE

## 2024-12-04 PROCEDURE — 80061 LIPID PANEL: CPT | Performed by: FAMILY MEDICINE

## 2024-12-04 PROCEDURE — 83036 HEMOGLOBIN GLYCOSYLATED A1C: CPT | Performed by: FAMILY MEDICINE

## 2024-12-04 PROCEDURE — 36415 COLL VENOUS BLD VENIPUNCTURE: CPT | Performed by: FAMILY MEDICINE

## 2024-12-04 PROCEDURE — 84425 ASSAY OF VITAMIN B-1: CPT | Performed by: FAMILY MEDICINE

## 2024-12-04 PROCEDURE — 80053 COMPREHEN METABOLIC PANEL: CPT | Performed by: FAMILY MEDICINE

## 2024-12-04 PROCEDURE — 82607 VITAMIN B-12: CPT | Performed by: FAMILY MEDICINE

## 2024-12-04 PROCEDURE — 83540 ASSAY OF IRON: CPT | Performed by: FAMILY MEDICINE

## 2024-12-04 PROCEDURE — 84443 ASSAY THYROID STIM HORMONE: CPT | Performed by: OBSTETRICS & GYNECOLOGY

## 2024-12-04 PROCEDURE — 83735 ASSAY OF MAGNESIUM: CPT | Performed by: FAMILY MEDICINE

## 2024-12-04 PROCEDURE — 85025 COMPLETE CBC W/AUTO DIFF WBC: CPT | Performed by: OBSTETRICS & GYNECOLOGY

## 2024-12-10 ENCOUNTER — HOSPITAL ENCOUNTER (OUTPATIENT)
Dept: RADIOLOGY | Facility: HOSPITAL | Age: 64
Discharge: HOME | End: 2024-12-10
Attending: OBSTETRICS & GYNECOLOGY
Payer: COMMERCIAL

## 2024-12-10 DIAGNOSIS — Z12.31 BREAST CANCER SCREENING BY MAMMOGRAM: ICD-10-CM

## 2024-12-10 LAB — VIT B1 BLD-MCNC: 95 UG/L (ref 38–122)

## 2024-12-10 PROCEDURE — 77063 BREAST TOMOSYNTHESIS BI: CPT | Mod: TC

## 2024-12-10 PROCEDURE — 77067 SCR MAMMO BI INCL CAD: CPT | Mod: 26,,, | Performed by: RADIOLOGY

## 2024-12-10 PROCEDURE — 77063 BREAST TOMOSYNTHESIS BI: CPT | Mod: 26,,, | Performed by: RADIOLOGY

## 2025-01-07 ENCOUNTER — OFFICE VISIT (OUTPATIENT)
Dept: INTERNAL MEDICINE | Facility: CLINIC | Age: 65
End: 2025-01-07
Payer: COMMERCIAL

## 2025-01-07 VITALS
WEIGHT: 152.75 LBS | BODY MASS INDEX: 26.08 KG/M2 | HEIGHT: 64 IN | OXYGEN SATURATION: 98 % | DIASTOLIC BLOOD PRESSURE: 68 MMHG | HEART RATE: 99 BPM | SYSTOLIC BLOOD PRESSURE: 108 MMHG

## 2025-01-07 DIAGNOSIS — Z79.899 DRUG THERAPY: ICD-10-CM

## 2025-01-07 DIAGNOSIS — Z98.890 STATUS POST GASTRIC SURGERY: ICD-10-CM

## 2025-01-07 DIAGNOSIS — Z00.00 ANNUAL PHYSICAL EXAM: Primary | ICD-10-CM

## 2025-01-07 PROCEDURE — 3078F DIAST BP <80 MM HG: CPT | Mod: CPTII,S$GLB,, | Performed by: FAMILY MEDICINE

## 2025-01-07 PROCEDURE — 99396 PREV VISIT EST AGE 40-64: CPT | Mod: S$GLB,,, | Performed by: FAMILY MEDICINE

## 2025-01-07 PROCEDURE — 3008F BODY MASS INDEX DOCD: CPT | Mod: CPTII,S$GLB,, | Performed by: FAMILY MEDICINE

## 2025-01-07 PROCEDURE — 1159F MED LIST DOCD IN RCRD: CPT | Mod: CPTII,S$GLB,, | Performed by: FAMILY MEDICINE

## 2025-01-07 PROCEDURE — 3074F SYST BP LT 130 MM HG: CPT | Mod: CPTII,S$GLB,, | Performed by: FAMILY MEDICINE

## 2025-01-07 PROCEDURE — 99999 PR PBB SHADOW E&M-EST. PATIENT-LVL IV: CPT | Mod: PBBFAC,,, | Performed by: FAMILY MEDICINE

## 2025-01-07 NOTE — PROGRESS NOTES
Subjective:       Patient ID: Ayah Ceron is a 64 y.o. female.    Chief Complaint: Annual Exam    HPI    Ayah Ceron is a 64 y.o. female PMHx HLD, VINOD for checkup.      #Cards: HLD     #Hep: Fatty liver, Elev LFTs  - was following with Dr. Vasquez,  2017  - consider f/u w/ hep in future     #Neuro: Atypical migraine  - est w/ Dr. Merida,  3/2022     #Sleep med: VINOD on cpap  - est w/ Dr. Muniz,  12/2023     #Podiatry: Hammer toes Bl  - est w/ Dr. Henderson,  12/2023    #Obgyn:  - est w/ Dr. Muniz,  3/2024     #BMI 26: S/p Gastric bypass in 2011  - sees bariatric team ~q2yr  - Dr. Ortiz was ordering full panel of labs yearly    Review of Systems   Constitutional:  Negative for appetite change, fatigue and fever.   HENT:  Negative for congestion.    Respiratory:  Negative for cough and shortness of breath.    Cardiovascular:  Negative for chest pain and palpitations.   Gastrointestinal:  Negative for abdominal pain, constipation, diarrhea, nausea and vomiting.   Genitourinary:  Negative for dysuria.   Musculoskeletal:  Negative for back pain.   Skin:  Negative for rash.   Neurological:  Negative for weakness, numbness and headaches.         Past Medical History:   Diagnosis Date    Anxiety     situational    Deviated septum     Fibroid     Hordeolum externum of right lower eyelid     Hyperlipemia     Liver disease     fatty liver    PONV (postoperative nausea and vomiting)     Sleep apnea     cpap pressure 8    Steatosis         Prior to Admission medications    Medication Sig Start Date End Date Taking? Authorizing Provider   alendronate (FOSAMAX) 70 MG tablet Take 1 tablet (70 mg total) by mouth every 7 days. 3/7/24 3/7/25  Shani Muniz MD   ALPRAZolam (XANAX) 0.25 MG tablet Take 1 tablet (0.25 mg total) by mouth nightly as needed for Anxiety. 11/27/23 12/28/23  Cruz Renetria MD   biotin 1 mg tablet Take 1,000 mcg by mouth 3 (three) times daily.    Provider, Historical   calcium citrate-vitamin D3  500 mg calcium -400 unit Chew Take by mouth. 2 tablets once per day    Provider, Historical   coenzyme Q10 100 mg capsule Take 100 mg by mouth once daily.    Provider, Historical   conjugated estrogens (PREMARIN) vaginal cream Place 0.5 g vaginally twice a week. 12/6/18   Clarice Gutierrez MD   cyanocobalamin 500 MCG tablet Take 500 mcg by mouth once daily. sublingual    Provider, Historical   diclofenac sodium (VOLTAREN) 1 % Gel Apply 2 g topically 4 (four) times daily. Apply to affected area 12/6/21   Bright Real MD   docusate sodium (COLACE) 100 MG capsule Take 100 mg by mouth 2 (two) times daily.    Provider, Historical   doxycycline monohydrate 100 mg Tab Take 1 tablet (100 mg total) by mouth daily 7/7/20   Neda Clark PA-C   erythromycin (ROMYCIN) ophthalmic ointment Place a 1/2 inch ribbon of ointment into the lower eyelid 11/14/22   Cruz Renteria MD   FERROUS FUMARATE/VIT BCOMP&C (SUPER B COMPLEX ORAL) Take by mouth.    Provider, Historical   ivermectin (SOOLANTRA) 1 % Crea Apply to affected area of face every night and wash off in morning 7/9/20   Neda Clark PA-C   ivermectin (STROMECTOL) 3 mg Tab Take 6 pills together then repeat in 2 weeks 7/7/20   Neda Clark PA-C   loratadine (CLARITIN) 10 mg tablet Take 1 tablet (10 mg total) by mouth once daily. 9/22/24 3/22/25  Cruz Renteria MD   MAGNESIUM OXIDE (MAG-OXIDE ORAL) Take by mouth. Try to get 400-500 a day    Provider, Historical   metronidazole 1% (METROGEL) 1 % Gel Apply topically once daily. 11/2/20 11/2/21  Elena Ortiz MD   multivitamin capsule Take 1 capsule by mouth 2 (two) times daily.    Provider, Historical   riboflavin, vitamin B2, (VITAMIN B-2) 100 mg Tab tablet Take 100 mg by mouth once daily.    Provider, Historical   sulfacetamide sodium-sulfur (SULFACLEANSE 8-4) 8-4 % Susp Wash face qd - bid 7/7/20   Neda Clark PA-C   traZODone (DESYREL) 50 MG tablet Take 1 tablet (50 mg total) by mouth  "nightly as needed for Insomnia. 9/22/24   Cruz Renteria MD        Past medical history, surgical history, and family medical history reviewed and updated as appropriate.    Medications and allergies reviewed.     Objective:          Vitals:    01/07/25 1412   BP: 108/68   BP Location: Left arm   Patient Position: Sitting   Pulse: 99   SpO2: 98%   Weight: 69.3 kg (152 lb 12.5 oz)   Height: 5' 4" (1.626 m)     Body mass index is 26.22 kg/m².  Physical Exam  Vitals and nursing note reviewed.   Constitutional:       General: She is not in acute distress.  Cardiovascular:      Rate and Rhythm: Normal rate and regular rhythm.      Pulses: Normal pulses.      Heart sounds: Normal heart sounds. No murmur heard.  Pulmonary:      Effort: Pulmonary effort is normal. No respiratory distress.      Breath sounds: Normal breath sounds. No wheezing.   Neurological:      Mental Status: She is alert and oriented to person, place, and time.   Psychiatric:         Mood and Affect: Mood normal.         Behavior: Behavior normal.         Lab Results   Component Value Date    WBC 4.23 12/04/2024    WBC 4.23 12/04/2024    HGB 14.8 12/04/2024    HGB 14.8 12/04/2024    HCT 43.4 12/04/2024    HCT 43.4 12/04/2024     12/04/2024     12/04/2024    CHOL 229 (H) 12/04/2024    TRIG 84 12/04/2024    HDL 64 12/04/2024    ALT 42 12/04/2024    AST 32 12/04/2024     12/04/2024    K 4.2 12/04/2024     12/04/2024    CREATININE 0.8 12/04/2024    BUN 15 12/04/2024    CO2 31 (H) 12/04/2024    TSH 3.498 12/04/2024    TSH 3.498 12/04/2024    INR 1.1 10/17/2012    HGBA1C 5.4 12/04/2024       Assessment:       1. Annual physical exam    2. Drug therapy    3. Status post gastric surgery          Plan:   1. Annual physical exam    2. Drug therapy    3. Status post gastric surgery  Overview:  2011          Health maintenance reviewed with patient.     Follow up in about 1 year (around 1/7/2026) for Annual.    As this patient's primary " care physician, I am actively managing and/or treating his/her chronic medical conditions now and in the future. This includes, but is not limited to, medication management, coordination of care, documentation review from his/her specialists, and labs/imaging review that I have performed to prepare for this visit as well as will do so in the future as part of my care continuity for this patient.    Cruz Renteria MD  Family Medicine / Primary Care  [facility]  1/7/2025

## 2025-01-25 DIAGNOSIS — M81.0 POST-MENOPAUSAL OSTEOPOROSIS: ICD-10-CM

## 2025-01-25 RX ORDER — ALENDRONATE SODIUM 70 MG/1
70 TABLET ORAL
Qty: 12 TABLET | Refills: 3 | Status: CANCELLED | OUTPATIENT
Start: 2025-01-25 | End: 2026-01-25

## 2025-01-26 ENCOUNTER — PATIENT MESSAGE (OUTPATIENT)
Dept: INTERNAL MEDICINE | Facility: CLINIC | Age: 65
End: 2025-01-26
Payer: COMMERCIAL

## 2025-01-26 DIAGNOSIS — M81.0 POST-MENOPAUSAL OSTEOPOROSIS: ICD-10-CM

## 2025-01-26 DIAGNOSIS — J32.9 SINUSITIS, UNSPECIFIED CHRONICITY, UNSPECIFIED LOCATION: Primary | ICD-10-CM

## 2025-01-26 RX ORDER — ALENDRONATE SODIUM 70 MG/1
70 TABLET ORAL
Qty: 12 TABLET | Refills: 3 | Status: CANCELLED | OUTPATIENT
Start: 2025-01-25 | End: 2026-01-25

## 2025-01-27 ENCOUNTER — TELEPHONE (OUTPATIENT)
Dept: OBSTETRICS AND GYNECOLOGY | Facility: CLINIC | Age: 65
End: 2025-01-27
Payer: COMMERCIAL

## 2025-01-27 DIAGNOSIS — M81.0 POST-MENOPAUSAL OSTEOPOROSIS: ICD-10-CM

## 2025-01-27 RX ORDER — ALENDRONATE SODIUM 70 MG/1
70 TABLET ORAL
Qty: 12 TABLET | Refills: 3 | Status: CANCELLED | OUTPATIENT
Start: 2025-01-27 | End: 2026-01-27

## 2025-01-27 RX ORDER — ALENDRONATE SODIUM 70 MG/1
70 TABLET ORAL
Qty: 12 TABLET | Refills: 0 | Status: SHIPPED | OUTPATIENT
Start: 2025-01-27

## 2025-01-27 RX ORDER — AZITHROMYCIN 250 MG/1
TABLET, FILM COATED ORAL
Qty: 6 TABLET | Refills: 0 | Status: SHIPPED | OUTPATIENT
Start: 2025-01-27 | End: 2025-02-01

## 2025-01-27 NOTE — TELEPHONE ENCOUNTER
Called to assist patient with scheduling annual . Appt added on . Patient expressed understanding.

## 2025-01-27 NOTE — TELEPHONE ENCOUNTER
Refill Routing Note   Medication(s) are not appropriate for processing by [facility] for the following reason(s):        Outside of protocol    [facility] action(s):  Route               Appointments  past 12m or future 3m with PCP    Date Provider   Last Visit   3/7/2024 Shani Muniz MD   Next Visit   2/19/2025 Shani Muniz MD   ED visits in past 90 days: 0        Note composed:2:16 PM 01/27/2025

## 2025-01-27 NOTE — TELEPHONE ENCOUNTER
Refill Routing Note   Medication(s) are not appropriate for processing by [facility] for the following reason(s):        Outside of protocol    [facility] action(s):  Route               Appointments  past 12m or future 3m with PCP    Date Provider   Last Visit   3/7/2024 Shani Muniz MD   Next Visit   Visit date not found Shani Muniz MD   ED visits in past 90 days: 0        Note composed:8:53 AM 01/27/2025

## 2025-01-27 NOTE — TELEPHONE ENCOUNTER
----- Message from Daphne sent at 1/25/2025 11:09 AM CST -----  Regarding: returning call    Type:  Patient Returning Call    Who Called:pt     Who Left Message for Patient:Maren     Does the patient know what this is regarding?r/s appt     Best Call Back Number: [phone]    Additional Information:

## 2025-01-29 RX ORDER — ZINC SULFATE 66 MG
TABLET ORAL
COMMUNITY

## 2025-01-29 NOTE — PROGRESS NOTES
Subjective:       Patient ID: Ayah Ceron is a 64 y.o. female.    Chief Complaint: No chief complaint on file.    HPI  The patient location is: [place]  The chief complaint leading to consultation is: symptoms    Visit type: audiovisual    Face to Face time with patient: 10 min   30 minutes of total time spent on the encounter, which includes face to face time and non-face to face time preparing to see the patient (eg, review of tests), Obtaining and/or reviewing separately obtained history, Documenting clinical information in the electronic or other health record, Independently interpreting results (not separately reported) and communicating results to the patient/family/caregiver, or Care coordination (not separately reported).     Each patient to whom he or she provides medical services by telemedicine is:  (1) informed of the relationship between the physician and patient and the respective role of any other health care provider with respect to management of the patient; and (2) notified that he or she may decline to receive medical services by telemedicine and may withdraw from such care at any time.    Notes:     Ayah Ceron is a 64 y.o. female PMHx HLD, VINOD for virtual     Feels a lot better compared to earlier in wk.     #Cards: HLD     #Hep: Fatty liver, Elev LFTs  - was following with Dr. Vasquez,  2017  - consider f/u w/ hep in future     #Neuro: Atypical migraine  - est w/ Dr. Merida,  3/2022     #Sleep med: VINOD on cpap  - est w/ Dr. Muniz,  12/2023     #Podiatry: Hammer toes Bl  - est w/ Dr. Henderson,  12/2023     #Obgyn:  - est w/ Dr. Muniz,  3/2024     #BMI 26: S/p Gastric bypass in 2011  - sees bariatric team ~q2yr  - Dr. Ortiz was ordering full panel of labs yearly    Review of Systems   Constitutional:  Negative for activity change and unexpected weight change.   HENT:  Negative for hearing loss, rhinorrhea and trouble swallowing.    Eyes:  Negative for discharge and visual disturbance.    Respiratory:  Negative for chest tightness and wheezing.    Cardiovascular:  Negative for chest pain and palpitations.   Gastrointestinal:  Negative for blood in stool, constipation, diarrhea and vomiting.   Endocrine: Negative for polydipsia and polyuria.   Genitourinary:  Negative for difficulty urinating, dysuria, hematuria and menstrual problem.   Musculoskeletal:  Negative for arthralgias, joint swelling and neck pain.   Neurological:  Negative for weakness and headaches.   Psychiatric/Behavioral:  Negative for confusion and dysphoric mood.          Past Medical History:   Diagnosis Date    Anxiety     situational    Deviated septum     Fibroid     Hordeolum externum of right lower eyelid     Hyperlipemia     Liver disease     fatty liver    PONV (postoperative nausea and vomiting)     Sleep apnea     cpap pressure 8    Steatosis         Prior to Admission medications    Medication Sig Start Date End Date Taking? Authorizing Provider   alendronate (FOSAMAX) 70 MG tablet Take 1 tablet (70 mg total) by mouth every 7 days. 1/27/25   Shani Muniz MD   ALPRAZolam (XANAX) 0.25 MG tablet Take 1 tablet (0.25 mg total) by mouth nightly as needed for Anxiety. 11/27/23 1/7/25  Cruz Renteria MD   azithromycin (Z-BERTO) 250 MG tablet Take 2 tablets by mouth on day 1; Take 1 tablet by mouth on days 2-5 1/27/25 2/1/25  Cruz Renteria MD   biotin 1 mg tablet Take 1,000 mcg by mouth 3 (three) times daily.    Provider, Historical   calcium citrate-vitamin D3 500 mg calcium -400 unit Chew Take by mouth. 2 tablets once per day    Provider, Historical   coenzyme Q10 100 mg capsule Take 100 mg by mouth once daily.    Provider, Historical   conjugated estrogens (PREMARIN) vaginal cream Place 0.5 g vaginally twice a week. 12/6/18   Clarice Gutierrez MD   cyanocobalamin 500 MCG tablet Take 500 mcg by mouth once daily. sublingual    Provider, Historical   diclofenac sodium (VOLTAREN) 1 % Gel Apply 2 g topically 4 (four)  times daily. Apply to affected area 12/6/21   Bright Real MD   docusate sodium (COLACE) 100 MG capsule Take 100 mg by mouth 2 (two) times daily.    Provider, Historical   erythromycin (ROMYCIN) ophthalmic ointment Place a 1/2 inch ribbon of ointment into the lower eyelid  Patient not taking: Reported on 1/7/2025 11/14/22   Cruz Renteria MD   FERROUS FUMARATE/VIT BCOMP&C (SUPER B COMPLEX ORAL) Take by mouth.    Provider, Historical   ivermectin (SOOLANTRA) 1 % Crea Apply to affected area of face every night and wash off in morning 7/9/20   Neda Clark PA-C   loratadine (CLARITIN) 10 mg tablet Take 1 tablet (10 mg total) by mouth once daily. 9/22/24 3/22/25  Cruz Renteria MD   MAGNESIUM OXIDE (MAG-OXIDE ORAL) Take by mouth. Try to get 400-500 a day    Provider, Historical   metronidazole 1% (METROGEL) 1 % Gel Apply topically once daily. 11/2/20 1/7/25  Elena Ortiz MD   multivitamin capsule Take 1 capsule by mouth 2 (two) times daily.    Provider, Historical   riboflavin, vitamin B2, (VITAMIN B-2) 100 mg Tab tablet Take 100 mg by mouth once daily.    Provider, Historical   sulfacetamide sodium-sulfur (SULFACLEANSE 8-4) 8-4 % Susp Wash face qd - bid 7/7/20   Neda Clark PA-C   traZODone (DESYREL) 50 MG tablet Take 1 tablet (50 mg total) by mouth nightly as needed for Insomnia. 9/22/24   Cruz Renteria MD        Past medical history, surgical history, and family medical history reviewed and updated as appropriate.    Medications and allergies reviewed.     Objective:          There were no vitals filed for this visit.  There is no height or weight on file to calculate BMI.  Physical Exam  Constitutional:       General: She is not in acute distress.     Appearance: Normal appearance.   Eyes:      Extraocular Movements: Extraocular movements intact.   Pulmonary:      Effort: Pulmonary effort is normal. No respiratory distress.   Neurological:      Mental Status: She is alert and  oriented to person, place, and time.   Psychiatric:         Mood and Affect: Mood normal.         Behavior: Behavior normal.         Lab Results   Component Value Date    WBC 4.23 12/04/2024    WBC 4.23 12/04/2024    HGB 14.8 12/04/2024    HGB 14.8 12/04/2024    HCT 43.4 12/04/2024    HCT 43.4 12/04/2024     12/04/2024     12/04/2024    CHOL 229 (H) 12/04/2024    TRIG 84 12/04/2024    HDL 64 12/04/2024    ALT 42 12/04/2024    AST 32 12/04/2024     12/04/2024    K 4.2 12/04/2024     12/04/2024    CREATININE 0.8 12/04/2024    BUN 15 12/04/2024    CO2 31 (H) 12/04/2024    TSH 3.498 12/04/2024    TSH 3.498 12/04/2024    INR 1.1 10/17/2012    HGBA1C 5.4 12/04/2024       Assessment:       1. Sinusitis, unspecified chronicity, unspecified location          Plan:   1. Sinusitis, unspecified chronicity, unspecified location      Complete course    No follow-ups on file.    As this patient's primary care physician, I am actively managing and/or treating his/her chronic medical conditions now and in the future. This includes, but is not limited to, medication management, coordination of care, documentation review from his/her specialists, and labs/imaging review that I have performed to prepare for this visit as well as will do so in the future as part of my care continuity for this patient.    Cruz Renteria MD  Family Medicine / Primary Care  [facility]  1/30/2025

## 2025-01-30 ENCOUNTER — OFFICE VISIT (OUTPATIENT)
Dept: INTERNAL MEDICINE | Facility: CLINIC | Age: 65
End: 2025-01-30
Payer: COMMERCIAL

## 2025-01-30 DIAGNOSIS — J32.9 SINUSITIS, UNSPECIFIED CHRONICITY, UNSPECIFIED LOCATION: Primary | ICD-10-CM

## 2025-02-12 ENCOUNTER — OFFICE VISIT (OUTPATIENT)
Dept: BARIATRICS | Facility: CLINIC | Age: 65
End: 2025-02-12
Payer: COMMERCIAL

## 2025-02-12 VITALS
WEIGHT: 151.25 LBS | DIASTOLIC BLOOD PRESSURE: 73 MMHG | BODY MASS INDEX: 25.96 KG/M2 | HEART RATE: 66 BPM | OXYGEN SATURATION: 98 % | SYSTOLIC BLOOD PRESSURE: 121 MMHG

## 2025-02-12 DIAGNOSIS — E66.3 OVERWEIGHT (BMI 25.0-29.9): ICD-10-CM

## 2025-02-12 DIAGNOSIS — R63.5 WEIGHT GAIN: ICD-10-CM

## 2025-02-12 DIAGNOSIS — Z98.84 HISTORY OF ROUX-EN-Y GASTRIC BYPASS: Primary | ICD-10-CM

## 2025-02-12 DIAGNOSIS — G47.33 OSA ON CPAP: ICD-10-CM

## 2025-02-12 PROCEDURE — 99999 PR PBB SHADOW E&M-EST. PATIENT-LVL V: CPT | Mod: PBBFAC,,, | Performed by: PHYSICIAN ASSISTANT

## 2025-02-12 NOTE — PROGRESS NOTES
Subjective:       Patient ID: Ayah Ceron is a 64 y.o. female.    Chief Complaint: Follow-up    HPI: Patient presents for 14 year follow up of LRNY.  Her only complaint is weight gain which is worst around the upper abdomen. Her PCP retired and she is now seeing Dr Renteria, who has been following her Vitamin levels.    Lowest weight: 117#'s    She is feeling well and tolerating the diet without difficulty.  She is without GI complaints.  She is maintaining 70 pound loss and a normal BMI.  She is doing very well.  She has a grand-daughter that is 5: [place] at [facility] [employer] in [place].                                                                         EXERCISE and VITAMINS: Reviewed in bariatric assessment.      Vitamin Name Yes No   Multi-Vitamin   Centrum Adult   X    Calcium Citrate   Citrate 500   Once daily    Vitamin B12 (SL or SC)    X    Thiamine   B Complex   X    Vitamin B2- headache prevention                                                                               DIET:  Regular bariatric diet.  Obtains >80 gm prot daily through 4-5 small meals and 2 protein shakes daily (mostly while at work and not the weekends).   48+ fl oz  BF- gladiator  Tiffanie- protein shake with lunch (varies, today mufelatta, half hamburger, vegetables and meat).   had bariatric surgery, regained his weight.                                                                                MEDICATIONS AND ALLERGIES:  Reviewed in Navigator.    Review of Systems   Constitutional:  Negative for activity change, appetite change, fatigue and fever.   HENT:  Negative for trouble swallowing and voice change.    Eyes:  Negative for photophobia, pain, discharge and visual disturbance.   Respiratory:  Negative for cough, choking and chest tightness.    Cardiovascular:  Negative for chest pain and palpitations.   Gastrointestinal:  Negative for abdominal distention, abdominal pain, blood in stool, constipation, diarrhea,  nausea and vomiting.   Genitourinary:  Negative for difficulty urinating, dysuria, frequency, hematuria and urgency.   Musculoskeletal:  Negative for arthralgias, back pain, gait problem and joint swelling.       Objective:      Physical Exam  Vitals and nursing note reviewed.   Constitutional:       Appearance: She is well-developed.   HENT:      Head: Normocephalic and atraumatic.   Eyes:      General: No scleral icterus.     Conjunctiva/sclera: Conjunctivae normal.   Skin:     General: Skin is warm and dry.   Neurological:      Mental Status: She is alert and oriented to person, place, and time.   Psychiatric:         Behavior: Behavior normal.         Assessment:       -  Excellent weight loss (82% EWL) s/p LRNY on 4/25/2011.  -  Co Morbidities: VINOD (stable with CPAP), KAHN (resolved), and HDL.  All stable without medication.   -  Excellent diet.  -  Good exercise.  -  Good vitamin regimen.  -  Osteoporosis, on Alendronate and Calcium citrate 500 mg daily.    Plan:       - Patient to follow bariatric diet plan and continue obtaining 80 gm protein daily.   - Patient to RESTART exercising on a regular basis: 30 minutes 4-5 days a week.  - Continue avoidance of NSAIDs to prevent gastric ulcer disease.  - Patient to take bariatric vitamin regimen: MVI, calcium citrate, and Vitamin B12 as directed.  - Increase calcium citrate to 1500 mg daily in 3 doses separate from MVI.  See AVS for instructions.   - Discussed Wegovy and Zepbound, she has an appointment scheduled for [place] in March.  She is considering utilizing one of these medications.   - RTC yearly or every other year or sooner if needed.  - Call the office for any issues.  - Reviewed yearly labs from PCP labs Sept 2019.  - Continue yearly monitoring of vitamin values with PCP.      15 minute visit, over 15 minutes spent counseling patient on diet, vitamins, exercise, and weight loss.

## 2025-02-12 NOTE — PATIENT INSTRUCTIONS
Meal and Vitamin Regimen: set timers or use phone imelda to remind you to eat every 3-4 hours.  Each meal should contain 10-25 gm protein    - Breakfast: Multi Vitamin with iron, B Complex with thiamine 25-50 mg, and Sublingual Vitamin B12 500 mcg (leave under tongue for 30 seconds)  - snack: calcium citrate (500 mg)  - Lunch calcium citrate (500 mg)  - snack: calcium citrate (500 mg)  - Dinner: Multi Vitamin with iron    Sample meal plan  80-120g protein; 1820-0400 calories  Protein drinks and bars: 0-4 grams sugar  Drink lots of water throughout the day and exercise!  MENU # 1  Breakfast: 2 eggs, 1 turkey sausage te  Lunch: 2-3 roll-ups (sliced turkey, low-fat slice cheese), baby carrots dipped in 1 Tbsp natural peanut butter  Mid-Day Snack: ¼ cup unsalted almonds, ½ cup fruit  Supper: 1 chicken thigh simmered in tomato sauce + 2 Tbsp mozzarella cheese, ½ cup black beans, 1/2 cup steamed veggies  Evening Snack: 1/2 cup grapes with 4 cubes low-fat cheddar cheese   ___________________________________________________  MENU # 2  Breakfast: protein drink  Mid-morning snack : ¼ cup unsalted nuts  Lunch: 1 cup tuna or chicken salad made with light bellamy, over salad.   Supper: hamburger te, slice of cheese, 1 cup steamed veggies.   Snack: light yogurt      Menu #3  Breakfast: 6oz plain Greek yogurt + fruit (½ banana, ½ cup fruit - pineapple, blueberries, strawberries, peach), may add Splenda to jerome.  Lunch: ½  chicken breast w/ slice pepper carin cheese, 1/2 cup steamed veggies and small salad with Salad Spritzer.    Mid-Day snack: protein drink   Supper: 4oz Grilled fish, grilled veggie kabob ( mushrooms, onion, bell pepper, yellow squash, zucchini, cherry tomatoes)  Evening Snack: fudgsicle no-sugar-added      Menu # 4  Breakfast: vanilla iced coffee: 1 scoop vanilla protein powder + 4oz skim milk + 4oz coffee   Snack: protein bar  Lunch: 2 Lettuce tacos: ¼ cup seasoned ground turkey wrapped in a Simon lettuce  leaf with 1 Tbsp shredded cheese and dollop low-fat sour cream  Dinner: Shrimp omelet: 2 eggs, ½ cup shrimp, green onions, and shredded cheese      Menu #5  Breakfast: 1 cup low-fat cottage cheese, ½ cup peaches (no sugar added)  Lunch: 2 oz baked pork chop, 1 cup okra and tomato stew  Snack: 1 cup black beans + salsa + dollop sour cream  Dinner: Caprese chicken salad: 2 oz chicken, 1oz fresh mozzarella, sliced tomato, 1 Tbsp olive oil, basil  Snack: sugar-free pudding cup      Menu #6  Breakfast: ½ cup part-skim ricotta cheese, 2 Tbsp sugar-free strawberry preserves, ¼ cup slivered almonds  Lunch: 2 oz canned chicken, 1oz shredded cheddar cheese, ½ cup black beans, 2 Tbsp salsa  Snack: Protein drink  Dinner: 4 oz grilled salmon steak, over mixed green salad with light dressing            Bariatric Vitamin and Mineral Needs    Bariatric surgery can change the way your body absorbs certain vitamins and minerals. Plus, with a smaller stomach, it is harder for you to get all the nutrients you need through food. After bariatric surgery, it is essential for you to take the following vitamins and minerals for the rest of your life to avoid nutrient deficiency. You can purchase the required vitamins and minerals from stores in-person and online ([employer], [employer], Vitamin [employer]pe) and from bariatric specialty websites likes the ones listed below:    https://www.bariatricadChannel Intellectage.[employer]kom/ (use verification code [facility] for discount)  https://store.bariatricOne4All.[employer]/ (use discount code RTDDJ073 for 20% off your first order)  https://celebratevitamins.com/  https://www.bariatricfusion.com/  https://unjury.com/    NOTE: Any pill larger than 5 mm will need to be split or crushed until you are two weeks post-op. 5 mm is about the size of a pencil eraser.  DO NOT take gummy multivitamins due to poor quality and sugar content.                    Take one vitamin/mineral/medication at a time, spaced 10-15 mins apart.

## 2025-02-19 ENCOUNTER — OFFICE VISIT (OUTPATIENT)
Dept: OBSTETRICS AND GYNECOLOGY | Facility: CLINIC | Age: 65
End: 2025-02-19
Payer: COMMERCIAL

## 2025-02-19 VITALS
DIASTOLIC BLOOD PRESSURE: 64 MMHG | HEIGHT: 64 IN | BODY MASS INDEX: 25.97 KG/M2 | SYSTOLIC BLOOD PRESSURE: 100 MMHG | WEIGHT: 152.13 LBS

## 2025-02-19 DIAGNOSIS — Z12.31 BREAST CANCER SCREENING BY MAMMOGRAM: ICD-10-CM

## 2025-02-19 DIAGNOSIS — Z01.419 ENCOUNTER FOR ANNUAL ROUTINE GYNECOLOGICAL EXAMINATION: Primary | ICD-10-CM

## 2025-02-19 DIAGNOSIS — M81.0 POST-MENOPAUSAL OSTEOPOROSIS: ICD-10-CM

## 2025-02-19 DIAGNOSIS — M62.89 PELVIC FLOOR DYSFUNCTION IN FEMALE: ICD-10-CM

## 2025-02-19 DIAGNOSIS — N95.2 VAGINAL ATROPHY: ICD-10-CM

## 2025-02-19 RX ORDER — ALENDRONATE SODIUM 70 MG/1
70 TABLET ORAL
Qty: 12 TABLET | Refills: 0 | Status: SHIPPED | OUTPATIENT
Start: 2025-02-19

## 2025-02-19 RX ORDER — ESTRADIOL 10 UG/1
10 INSERT VAGINAL DAILY
Qty: 14 EACH | Refills: 0 | Status: SHIPPED | OUTPATIENT
Start: 2025-02-19 | End: 2025-03-05

## 2025-02-19 RX ORDER — ESTRADIOL 10 UG/1
10 INSERT VAGINAL
Qty: 8 EACH | Refills: 11 | Status: SHIPPED | OUTPATIENT
Start: 2025-02-20 | End: 2025-03-22

## 2025-02-19 RX ORDER — ALENDRONATE SODIUM 70 MG/1
70 TABLET ORAL
Qty: 12 TABLET | Refills: 0 | Status: SHIPPED | OUTPATIENT
Start: 2025-02-19 | End: 2025-02-19

## 2025-02-19 NOTE — PROGRESS NOTES
Chief Complaint: Well Woman Exam     HPI:      Ayah Ceron is a 64 y.o.  s/p hysterectomy for AUB-L who presents today for well woman exam. Not using vaginal estrogen consistently and notes continued dyspareunia and vaginal dryness. Did not start pelvic floor PT.       Previous Pap:      (No result found)  Previous Mammogram:   Results for orders placed during the hospital encounter of 12/10/24    Mammo Digital Screening Bilat w/ Alessio    Narrative  Facility:  [facility]  [address] [place] [address] [id number] [phone]    Name: Ayah Ceron    MRN: [id number]    Result:  Mammo Digital Screening Bilat w/ Alessio    History:  Patient is 63 y.o. and is seen for a screening mammogram.      Films Compared:  Prior images (if available) were compared.    Findings:  This procedure was performed using tomosynthesis.  Computer-aided detection was utilized in the interpretation of this examination.    There are scattered areas of fibroglandular density. There is no evidence of suspicious masses, microcalcifications or architectural distortion.    Impression  No mammographic evidence of malignancy.    BI-RADS Category 1: Negative    Recommendation:  Routine screening mammogram in 1 year is recommended.    Your estimated lifetime risk of breast cancer (to age 85) based on Tyrer-Cuzick risk assessment model is 8.09%.  According to the American Cancer Society, patients with a lifetime breast cancer risk of 20% or higher might benefit from supplemental screening tests, such as screening breast MRI.    Connor Arevalo MD     Most Recent Dexa: , osteopenia  Colonoscopy: , repeat 10 years    COVID vaccine: completed series  Gardasil:has never had     Problem List[1]    Past Medical History:   Diagnosis Date    Anxiety     situational    Deviated septum     Fibroid     Hordeolum externum of right lower eyelid     Hyperlipemia     Liver disease     fatty liver    PONV (postoperative  "nausea and vomiting)     Sleep apnea     cpap pressure 8    Steatosis        Past Surgical History:   Procedure Laterality Date    CARPAL TUNNEL RELEASE      COLONOSCOPY N/A 2022    Procedure: COLONOSCOPY;  Surgeon: Cruz Stahl MD;  Location: [facility] ([address] [place] [address]);  Service: Endoscopy;  Laterality: N/A;  old order , new order placed  fully vaccinated, instructions sent to [facility]  22 - Pt confirmed 0700 arrival time - ERW    DILATION AND CURETTAGE OF UTERUS      EYE SURGERY      GASTRIC BYPASS  2011    HEMORRHOID SURGERY      HYSTERECTOMY      HYSTERECTOMY, VAGINAL, WITH SALPINGO-OOPHORECTOMY      uterine fibroids    LASIK      10/26/03    TONSILLECTOMY, ADENOIDECTOMY      TUBAL LIGATION      WISDOM TOOTH EXTRACTION         OB History          6    Para   3    Term   2       1    AB   3    Living   3         SAB   3    IAB        Ectopic        Multiple        Live Births   3           Obstetric Comments   No abnormal pap  No STDs               ROS:     Review of Systems   Constitutional:  Negative for activity change, fatigue and unexpected weight change.   Respiratory:  Negative for shortness of breath.    Cardiovascular:  Negative for chest pain.   Gastrointestinal:  Negative for abdominal pain, blood in stool, constipation and diarrhea.   Endocrine: Negative for cold intolerance and heat intolerance.   Genitourinary:  Positive for dyspareunia and vaginal dryness. Negative for bladder incontinence, dysuria, frequency, hot flashes and vaginal bleeding.   Integumentary:  Negative for breast mass, breast discharge and breast tenderness.   Psychiatric/Behavioral:  Negative for dysphoric mood. The patient is not nervous/anxious.    Breast: Negative for mass and tenderness      Physical Exam:      PHYSICAL EXAM:  /64   Ht 5' 4" (1.626 m)   Wt 69 kg (152 lb 1.9 oz)   BMI 26.11 kg/m²   Body mass index is 26.11 kg/m².     APPEARANCE: Well nourished, well " developed, in no acute distress.  PSYCH: Appropriate mood and affect.  SKIN: No acne or hirsutism  NECK: Neck symmetric without masses or thyromegaly  NODES: No inguinal, axillary, or supraclavicular lymph node enlargement  ABDOMEN: Soft.  No tenderness or masses.    CARDIOVASCULAR: No edema of peripheral extremities  BREASTS: Symmetrical, no skin changes or visible lesions.  No palpable masses or nipple discharge bilaterally.  PELVIC: Normal external genitalia without lesions.  Normal hair distribution.  Adequate perineal body, normal urethral meatus.  Vagina moist and well rugated without lesions or discharge. No significant cystocele or rectocele.  Uterus and cervix surgically absent. Adnexa without masses or tenderness.      Assessment:     1. Encounter for annual routine gynecological examination        2. Breast cancer screening by mammogram  Mammo Digital Screening Bilat w/ Alessio (XPD)      3. Post-menopausal osteoporosis  DXA Bone Density Axial Skeleton 1 or more sites    alendronate (FOSAMAX) 70 MG tablet    DISCONTINUED: alendronate (FOSAMAX) 70 MG tablet      4. Vaginal atrophy  estradioL (IMVEXXY STARTER PACK) 10 mcg InPk    estradioL (IMVEXXY MAINTENANCE PACK) 10 mcg Inst      5. Pelvic floor dysfunction in female  Ambulatory Referral/Consult to Physical/Occupational Therapy            Plan:     Clinical breast exam performed.  Pap history reviewed.  No further pap screening indicated given hysterectomy for benign indications.  Mammogram ordered.  DEXA ordered for 2026. Continue Ca, Vit D, weight bearing exercise, and Fosamax.  Colonoscopy UTD.  Recommend switching to Imvexxy for VVA.   Recommend pelvic floor PT.  Patient declines genetic testing.  Follow up in about 1 year (around 2/19/2026) for annual well woman exam or as needed.    Counseling:     Patient was counseled today on current ASCCP pap guidelines, the recommendation for yearly pelvic exams, healthy diet and exercise routines, breast self  awareness and annual mammograms.She is to see her PCP for other health maintenance.     Use of the [facility] Patient Portal discussed and encouraged during today's visit.         Shani Muniz MD  [facility]  02/19/2025         [1]   Patient Active Problem List  Diagnosis    Functional dyspareunia    Deviated septum    Sinus problem    Status post gastric surgery    Vaginal relaxation    Incomplete defecation    Rectocele    Atrophic vaginitis    NAFL (nonalcoholic fatty liver)    VINOD on CPAP    Elevated transaminase level    Overweight (BMI 25.0-29.9)    Mixed hyperlipidemia    [place] cardiac risk <10% in next 10 years    Insomnia    Drug therapy

## 2025-02-25 ENCOUNTER — CLINICAL SUPPORT (OUTPATIENT)
Dept: OTHER | Facility: CLINIC | Age: 65
End: 2025-02-25

## 2025-02-25 DIAGNOSIS — Z00.8 ENCOUNTER FOR OTHER GENERAL EXAMINATION: ICD-10-CM

## 2025-02-26 VITALS
HEIGHT: 63 IN | DIASTOLIC BLOOD PRESSURE: 74 MMHG | SYSTOLIC BLOOD PRESSURE: 106 MMHG | BODY MASS INDEX: 26.22 KG/M2 | WEIGHT: 148 LBS

## 2025-02-26 LAB
GLUCOSE SERPL-MCNC: 84 MG/DL (ref 60–140)
HDLC SERPL-MCNC: 77 MG/DL
POC CHOLESTEROL, LDL (DOCK): 178 MG/DL
POC CHOLESTEROL, TOTAL: 288 MG/DL
TRIGL SERPL-MCNC: 182 MG/DL

## 2025-02-28 NOTE — TELEPHONE ENCOUNTER
----- Message from Zafar Bhandari sent at 10/12/2020  9:57 AM CDT -----  Regarding: [phone]  Ayah is requesting a callback.  She has an appointment to see the doctor for her annual on 11/02/2020.  She states that she needs to get a mammogram and have blood work done.    Callback number: [phone]     Goal Outcome Evaluation:              Outcome Evaluation: pt c/o pain with movement; turn q2; BP can be soft; IV bolus given today; safety maintained

## 2025-03-10 ENCOUNTER — PATIENT MESSAGE (OUTPATIENT)
Dept: BARIATRICS | Facility: CLINIC | Age: 65
End: 2025-03-10
Payer: COMMERCIAL

## 2025-03-10 ENCOUNTER — OFFICE VISIT (OUTPATIENT)
Dept: INTERNAL MEDICINE | Facility: CLINIC | Age: 65
End: 2025-03-10
Payer: COMMERCIAL

## 2025-03-10 ENCOUNTER — PATIENT MESSAGE (OUTPATIENT)
Dept: INTERNAL MEDICINE | Facility: CLINIC | Age: 65
End: 2025-03-10

## 2025-03-10 DIAGNOSIS — E66.3 OVERWEIGHT (BMI 25.0-29.9): Primary | ICD-10-CM

## 2025-03-10 DIAGNOSIS — G47.00 INSOMNIA, UNSPECIFIED TYPE: ICD-10-CM

## 2025-03-10 PROCEDURE — 99499 UNLISTED E&M SERVICE: CPT | Mod: 95,,,

## 2025-03-10 RX ORDER — SEMAGLUTIDE 0.5 MG/.5ML
0.5 INJECTION, SOLUTION SUBCUTANEOUS
Qty: 2 ML | Refills: 0 | Status: ACTIVE | OUTPATIENT
Start: 2025-03-10

## 2025-03-10 RX ORDER — SEMAGLUTIDE 0.25 MG/.5ML
0.25 INJECTION, SOLUTION SUBCUTANEOUS
Qty: 2 ML | Refills: 0 | Status: ACTIVE | OUTPATIENT
Start: 2025-03-10

## 2025-03-10 RX ORDER — SEMAGLUTIDE 1 MG/.5ML
1 INJECTION, SOLUTION SUBCUTANEOUS
Qty: 2 ML | Refills: 1 | Status: ACTIVE | OUTPATIENT
Start: 2025-03-10

## 2025-03-10 NOTE — TELEPHONE ENCOUNTER
No care due was identified.  [facility] Embedded Care Due Messages. Reference number: [id number].   3/10/2025 4:57:42 PM CDT

## 2025-03-10 NOTE — PATIENT INSTRUCTIONS
Wegovy Patient Education  Savings Card  Follow this link to sign up for your Wegovy savings card. You will need this information when the specialty pharmacy contacts you to help pay for your prescription.  Wegovy Savings Card    Dosing Schedule      Choosing an Injection Site and Using your Pen       - Pens are stored in the refrigerator and can be removed 20-30 minutes before the injection to allow the medication to come to room temperature to avoid irritation, burning, or bruising with injection.   - Administration Video: Step-by-Step Instructions for Administering Wegovy    What to Expect      Rare, but Serious Side Effects  - Wegovy may cause pancreatitis or gallstones. If you experience severe abdominal pain that may radiate to the back and may or may not be accompanied by vomiting, you are encouraged to seek emergency medical attention to assess your symptoms.     Reproduction, Pregnancy, and Lactation Considerations  - Wegovy is not recommended for use in patients who are currently pregnant or breastfeeding.   - If you plan to become pregnant, the use of this medication is not recommended at the time of conception. It is recommended that this medication should be discontinued at least 2 months prior to conception.     Patients using Oral Contraceptives  - Use of medications like Wegovy may decrease the effectiveness of your oral hormonal contraceptives.   - You are encouraged to add a barrier method of contraception for 4 weeks after initiation and for 4 weeks after each dose titration of Wegovy to minimize this potential risk.     Missed or Changing Your Dosing Schedule  - If you miss a dose of Wegovy, take it as soon as possible, within 5 days of your scheduled dose. If more than 5 days have passed, skip the missed dose and take your next dose on your regularly scheduled day.  - If you would like to change the day of the week you take your Wegovy dose, make sure there are at least 2 days between doses.

## 2025-03-10 NOTE — PROGRESS NOTES
Patient ID: Ayah Ceron is a 64 y.o. White female    Subjective  Chief Complaint: patient presents for medical weight loss management.    Contraindications to GLP-1 receptor agonist therapy:   Denies personal or family history of MTC, personal history of MEN2, history of allergic reaction while taking a GLP-1 receptor agonist, and history of pancreatitis while taking a GLP-1 receptor agonist       Co-morbidities: DLD, NAFLD, VINOD    History of weight loss therapy:  Was on semaglutide at the end of 2024. D/c due to cost. Got up to 0.5 mg weekly. Reports tolerated well.     Weight loss history:  Starting weight:    3/10/2025   Recent Readings    Weight (lbs) 153.7 lb    BMI 27.22 BMI      % weight loss since GLP-1 initiation: na %    Objective  Lab Results   Component Value Date     12/04/2024     02/01/2024     10/31/2023     Lab Results   Component Value Date    K 4.2 12/04/2024    K 3.9 02/01/2024    K 4.1 10/31/2023     Lab Results   Component Value Date     12/04/2024     02/01/2024     10/31/2023     Lab Results   Component Value Date    CO2 31 (H) 12/04/2024    CO2 30 (H) 02/01/2024    CO2 26 10/31/2023     Lab Results   Component Value Date    BUN 15 12/04/2024    BUN 15 02/01/2024    BUN 11 10/31/2023     Lab Results   Component Value Date    GLU 89 12/04/2024    GLU 86 02/01/2024    GLU 93 10/31/2023     Lab Results   Component Value Date    CALCIUM 9.6 12/04/2024    CALCIUM 9.6 02/01/2024    CALCIUM 9.0 10/31/2023     Lab Results   Component Value Date    PROT 6.5 12/04/2024    PROT 6.3 02/01/2024    PROT 6.1 10/31/2023     Lab Results   Component Value Date    ALBUMIN 3.9 12/04/2024    ALBUMIN 3.6 02/01/2024    ALBUMIN 3.7 10/31/2023     Lab Results   Component Value Date    BILITOT 0.5 12/04/2024    BILITOT 0.4 02/01/2024    BILITOT 0.4 10/31/2023     Lab Results   Component Value Date    AST 32 12/04/2024    AST 26 02/01/2024    AST 33 10/31/2023     Lab Results    Component Value Date    ALT 42 12/04/2024    ALT 43 02/01/2024    ALT 47 (H) 10/31/2023     Lab Results   Component Value Date    ANIONGAP 6 (L) 12/04/2024    ANIONGAP 6 (L) 02/01/2024    ANIONGAP 11 10/31/2023     Lab Results   Component Value Date    CREATININE 0.8 12/04/2024    CREATININE 0.7 02/01/2024    CREATININE 0.6 10/31/2023     Lab Results   Component Value Date    EGFRNORACEVR >60.0 12/04/2024    EGFRNORACEVR >60.0 02/01/2024    EGFRNORACEVR >60.0 10/31/2023     Assessment/Plan  -Pt qualifies for GLP-1 RA therapy based on BMI greater than or equal to 27 kg/m2 plus presence of co-morbidities  - Initiate Wegovy 0.25 mg once weekly for 1 month  - Then increase to Wegovy 0.5 mg once weekly for 1 month  - Then increase to Wegovy 1 mg once weekly  - RTC in 3 months for follow-up evaluation      Patient consented to pharmacist management via collaborative practice.

## 2025-03-11 RX ORDER — LORATADINE 10 MG/1
10 TABLET ORAL DAILY
Qty: 90 TABLET | Refills: 1 | Status: SHIPPED | OUTPATIENT
Start: 2025-03-11 | End: 2025-09-07

## 2025-03-11 RX ORDER — TRAZODONE HYDROCHLORIDE 50 MG/1
50 TABLET ORAL NIGHTLY PRN
Qty: 90 TABLET | Refills: 1 | Status: SHIPPED | OUTPATIENT
Start: 2025-03-11

## 2025-03-20 ENCOUNTER — RESULTS FOLLOW-UP (OUTPATIENT)
Dept: OTHER | Facility: CLINIC | Age: 65
End: 2025-03-20

## 2025-03-26 ENCOUNTER — OFFICE VISIT (OUTPATIENT)
Dept: OPTOMETRY | Facility: CLINIC | Age: 65
End: 2025-03-26
Payer: COMMERCIAL

## 2025-03-26 DIAGNOSIS — H52.4 MYOPIA WITH ASTIGMATISM AND PRESBYOPIA, LEFT: ICD-10-CM

## 2025-03-26 DIAGNOSIS — H52.12 MYOPIA WITH ASTIGMATISM AND PRESBYOPIA, LEFT: ICD-10-CM

## 2025-03-26 DIAGNOSIS — Z01.00 EXAMINATION OF EYES AND VISION: Primary | ICD-10-CM

## 2025-03-26 DIAGNOSIS — H52.202 MYOPIA WITH ASTIGMATISM AND PRESBYOPIA, LEFT: ICD-10-CM

## 2025-03-26 PROCEDURE — 99999 PR PBB SHADOW E&M-EST. PATIENT-LVL III: CPT | Mod: PBBFAC,,, | Performed by: OPTOMETRIST

## 2025-03-26 PROCEDURE — 92015 DETERMINE REFRACTIVE STATE: CPT | Mod: S$GLB,,, | Performed by: OPTOMETRIST

## 2025-03-26 PROCEDURE — 92014 COMPRE OPH EXAM EST PT 1/>: CPT | Mod: S$GLB,,, | Performed by: OPTOMETRIST

## 2025-03-26 NOTE — PROGRESS NOTES
HPI    Annual [facility] Vision Exam   Pt states vision became blurry @ distance OD  Pt reports using systane and helped clear vision     Pt denies F/F     Pt denies Dry/ Itchy/ Burning  Pt reports Dry/Itchy/ FB Sensation/ Blurred Vision OD>OS  Gtt: Yes Systane, PRN   Pt reports mild relief     Last edited by Derrick Cartwright, OD on 3/26/2025  2:06 PM.            Assessment /Plan     For exam results, see Encounter Report.    Examination of eyes and vision  -[facility]  -S/P LASIK  -Reviewed impact of DED on [facility] Nightly lid scrubs using [employer]. Systane Complete PF as needed.    Myopia with astigmatism and presbyopia, left  Eyeglass Final Rx       Eyeglass Final Rx         Sphere Cylinder Axis Dist VA Add    Right +0.50 Sphere  20/30 +2.25    Left -2.00 +1.25 005 20/25 +2.25      Type: PAL    Expiration Date: 3/26/2026                  Optional sRx but potential benefit with frontal HAs      RTC 1 yr

## 2025-04-01 ENCOUNTER — PATIENT MESSAGE (OUTPATIENT)
Dept: OBSTETRICS AND GYNECOLOGY | Facility: CLINIC | Age: 65
End: 2025-04-01
Payer: COMMERCIAL

## 2025-04-01 ENCOUNTER — TELEPHONE (OUTPATIENT)
Dept: OBSTETRICS AND GYNECOLOGY | Facility: CLINIC | Age: 65
End: 2025-04-01
Payer: COMMERCIAL

## 2025-04-01 NOTE — TELEPHONE ENCOUNTER
Called to confirm patient prescription is updated due to patient requesting refills when she should have refills for the year. No answer. Left message.

## 2025-04-08 ENCOUNTER — PATIENT MESSAGE (OUTPATIENT)
Dept: BARIATRICS | Facility: CLINIC | Age: 65
End: 2025-04-08
Payer: COMMERCIAL

## 2025-04-08 ENCOUNTER — PATIENT MESSAGE (OUTPATIENT)
Dept: ADMINISTRATIVE | Facility: OTHER | Age: 65
End: 2025-04-08
Payer: COMMERCIAL

## 2025-05-06 ENCOUNTER — CLINICAL SUPPORT (OUTPATIENT)
Dept: REHABILITATION | Facility: HOSPITAL | Age: 65
End: 2025-05-06
Payer: COMMERCIAL

## 2025-05-06 DIAGNOSIS — M62.89 PELVIC FLOOR DYSFUNCTION: Primary | ICD-10-CM

## 2025-05-06 DIAGNOSIS — M62.89 PELVIC FLOOR DYSFUNCTION IN FEMALE: ICD-10-CM

## 2025-05-06 DIAGNOSIS — R10.2 PELVIC PAIN: ICD-10-CM

## 2025-05-06 PROBLEM — R53.1 WEAKNESS: Status: ACTIVE | Noted: 2025-05-06

## 2025-05-06 PROCEDURE — 97112 NEUROMUSCULAR REEDUCATION: CPT

## 2025-05-06 PROCEDURE — 97161 PT EVAL LOW COMPLEX 20 MIN: CPT

## 2025-05-06 PROCEDURE — 97530 THERAPEUTIC ACTIVITIES: CPT

## 2025-05-06 NOTE — PATIENT INSTRUCTIONS
"    DIAPHRAGMATIC BREATHING     The diaphragm is a dome shaped muscle that forms the floor of the rib cage. It is the most efficient muscle for breathing and relaxation, although most people are not used to using the diaphragm. Diaphragmatic or belly breathing is an important technique to learn because it helps settle down or relax the autonomic nervous system. The correct use of diaphragmatic breathing can help to quiet brain activity resulting in the relaxation of all the muscles and organs of the body. This is accomplished by slow rhythmic breathing concentrated in the diaphragm muscle rather than the chest.    How to do proper relaxation breathing:    Start by lying on your back or reclining in a chair in a relaxed position. Place one hand on your chest and the other on your abdomen.  Relax your jaw by placing your tongue on the roof of your mouth and keeping your teeth slightly apart.   Take a deep breath in, letting the abdomen expand and rise while you keep your upper chest, neck and shoulders relaxed.   As you breathe out, allow your abdomen and chest to fall. Exhale completely.  It doesn't matter if you breathe in/out through your nose and/or mouth. Do whichever feels comfortable.  Remember to breathe slowly.  Do not force your breathing. Do not hold your breath.  Repeat for 2-5 minutes every day.  (If you are short on time completing 3 deep breaths is better than none).                  "CHECK IN" WITH YOUR PELVIC FLOOR    Every hour, "check in" with your pelvic floor.  Is it tight and contracted?  Is it relaxed and dropped?    Take 10 seconds and try to release any tension in the pelvic floor muscles and external anal sphincter. You can try one or more of the following techniques.   Diaphragmatic Breathing  One quick flick  Mindfulness  Body scan   Gently pushing out your pelvic floor     Then return to your regular tasks.    Do this every hour throughout the day in any position.    "

## 2025-05-06 NOTE — PROGRESS NOTES
Physical Therapy Evaluation    Patient Name: Ayah Ceron  MRN: [id number]  YOB: 1960  Encounter Date: 5/6/2025    Therapy Diagnosis:   Encounter Diagnoses   Name Primary?    Pelvic floor dysfunction in female     Pelvic floor dysfunction Yes    Pelvic pain      Physician: Shani Muniz MD    Physician Orders: Eval and Treat  Medical Diagnosis: Pelvic floor dysfunction in female    Visit # / Visits Authorized:  1 / 1  Insurance Authorization Period: 2/19/2025 to 2/19/2026  Date of Evaluation: 5/6/2025  Plan of Care Certification: 5/6/2025 to 7-29-25     Time In: 0845   Time Out: 0932  Total Time (in minutes): 47   Total Billable Time (in minutes): 47    Intake Outcome Measure for FOTO Survey    Therapist reviewed FOTO scores for Ayah Ceron on 5/6/2025.   FOTO report - see Media section or FOTO account episode details.     Intake Score:  %         Subjective   History of Present Illness  Ayah is a 64 y.o. female who reports to physical therapy with a chief concern of pelvic pain with vaginal provocation.     The patient reports a medical diagnosis of pelvic floor dysfunction.            History of Present Condition/Illness: History of chronic and worsening pelvic pain with vaginal exams and intercourse.  She is using imvexxy for the past 3 months but is not sure if it is helping yet.      Activities of Daily Living  Social history was obtained from Patient.                   Pain     Patient reports a current pain level of 0/10. Pain at best is reported as 0/10. Pain at worst is reported as 10/10.   Location: anterior pelvic floor  Clinical Progression (since onset): Worsening  Pain Qualities: Sharp  Pain-Relieving Factors: Other (Comment)  Other Pain-Relieving Factors: stopping intercourse or vaginal exam  Pain-Aggravating Factors: Speculum insertion, [place]  No pain reported with tampon insertion in the past       Bladder/Bowel Habits and Symptoms  Bladder Habits  Urine Stream:  "Normal  Bladder Emptying: Complete  Frequency of Bladder Urgency: Never  Urinary Urge/Sensation: Normal  Ability to Delay Urination: More than 1 hour  Daytime Frequency of Urination: 1x a work (shift is 8 hours)  Nighttime Frequency of Urination: 0x  Just in Case Voiding: No  Toilet Mapping: No    Urinary Symptoms  Urine Leakage Amount: Few drops  Frequency of Urinary Incontinence: Other (Comment)  Other Frequency of Urinary Incontinence: rarely  Urinary Incontinence Aggravating Factors: Coughing, Sneezing  Able to Stop the Flow of Urine: Yes    Bowel Habits  Witter Stool Form Scale: type 5  Frequency of Bowel Movements: 1 time per day  Constipation: Frequently  Attempts to Manage Constipation: Stool softener  Additional Bowel Habit Details: Before increasing calcium her stool consistency was a type 4.  She reports it's difficulty to pass the type 5 stools.     Bowel Symptoms  Bowel Incontinence Issues: Fecal - solid, Fecal - liquid  Amount of Fecal Leakage: Small  Additional Bowel Symptoms Details: Happens mostly when squatting when gardening every 4-5 months. Occasionally stool will "sneak out" every couple months.     Bladder or Bowel Leakage Protection  Protection for Leakage: None        Sexual Function  Sexually Active: Yes  Sexual Partners: Male  Pain with Sexual Function: With initial penetration  Vaginal Dryness: Yes  Lubricant Use: Other (Comment)  Other Lubricant: not currently  Additional Sexual Function Details: Limiting partnered intimacy due to pain.       Past Medical History/Physical Systems Review:   Ayah Ceron  has a past medical history of Anxiety, Deviated septum, Fibroid, Hordeolum externum of right lower eyelid, Hyperlipemia, Liver disease, PONV (postoperative nausea and vomiting), Sleep apnea, and Steatosis.    Ayah Ceron  has a past surgical history that includes Dilation and curettage of uterus; Gastric bypass (04/25/2011); Hemorrhoid surgery; TONSILLECTOMY, ADENOIDECTOMY; Tubal " ligation; [place] tooth extraction; Carpal tunnel release; LASIK; hysterectomy, vaginal, with salpingo-oophorectomy; Colonoscopy (N/A, 07/25/2022); Eye surgery; and Hysterectomy.    Ayah has a current medication list which includes the following prescription(s): alendronate, alprazolam, biotin, calcium citrate-vitamin d3, coenzyme q10, collagen/biotin/ascorbic acid, cyanocobalamin, diclofenac sodium, docusate sodium, erythromycin, ferrous fumarate/vit bcomp,c, ivermectin, loratadine, magnesium oxide, metronidazole 1%, multivitamin, riboflavin (vitamin b2), wegovy, wegovy, sulfacetamide sodium-sulfur, trazodone, vitamin b complex/folic acid, and zinc-15.    Review of patient's allergies indicates:   Allergen Reactions    Augmentin [amoxicillin-pot clavulanate]      Abdominal cramping        Objective   Pelvic External Observations  Consent for Examination: Yes, Chaperone Present: Declines chaperone    Abdominal Assessment  Transversus Abdominis Contraction: Weak    Pelvic Assessment  Perineal Skin Quality: Unremarkable  Pelvic Scarring: Yes  Perineal Body Resting Position: Normal  Perineal Descent Bearing Down: Substitution observed  Volitional Contraction: Substitution observed  Volitional Relaxation: Present  Volitional Bearing Down: Substitution observed  Pelvic Floor Cough Reaction: Descent          Pelvic Floor Palpation  Pelvic Floor Exams Performed: Internal Vaginal  Internal Vaginal Palpation Details: Increased resting tension noted in layer 3 donna along with scar tissue restrictions.  .No pain reported at layers 1 or 2.                    Right Internal Pelvic Floor and Rectal Palpation  Unremarkable: Layer 1 and Layer 2          Left Internal Pelvic Floor and Rectal Palpation  Unremarkable: Layer 1 and Layer 2              Pelvic Floor Special Tests  Single Digit Intravaginal Assessment  Intravaginal Pelvic Floor Strength: 2  Intravaginal Pelvic Floor Co-Contraction Substitution: Absent  Intravaginal Pelvic  Floor Relaxation Response: Delayed, Incomplete  Right Vaginal Sensation: Intact  Left Vaginal Sensation: Intact             Treatment:  Balance/Neuromuscular Re-Education  NMR 2: pelvic muscle downtraining/bulging training pelvic floor relaxation speed after performing a kegel  Therapeutic Activity  TA 1: Plan of care review   Anatomy review and discussion of the anatomy on current level of function  TA 2: edu on pelvic floor check in for downtraining    Time Entry(in minutes):  PT Evaluation (Low) Time Entry: 31  Neuromuscular Re-Education Time Entry: 8  Therapeutic Activity Time Entry: 8    Assessment & Plan   Assessment  Ayah presents with a condition of Low complexity.   Presentation of Symptoms: Stable  Will Comorbidities Impact Care: No       Functional Limitations: Activity tolerance  Impairments: Abnormal coordination    Patient Goal for Therapy (PT): reduce pain with vaginal exam and intercourse  Prognosis: Good  Assessment Details: Pt  reports a history of pelvic pain and is chronic and continues to worsen which is impacting ADL participation.  Examination reveals pelvic floor coordination deficits and increased muscular tone with soft tissue restrictions. The patient is expected to benefit from skilled intervention to work towards improving lumbopelvic dysfunction, pelvic floor relaxation and coordination as well as improving overall strength and ROM in order to return towards prior level of function and ADL participation.        Patient's spiritual, cultural, and educational needs considered and patient agreeable to plan of care and goals.           Goals:   Active       LTG       Pt to be discharged with home plan for carry over after discharge.         Start:  05/06/25    Expected End:  07/29/25            Pt to report being able to have a comfortable rectal exam without significant increase in pelvic pain.        Start:  05/06/25    Expected End:  07/29/25            Patient will report a reduction in  "pelvic pain with intercourse by at least 80% to demonstrate a return towards prior level of function          Start:  05/06/25    Expected End:  07/29/25               STG       Pt to demonstrate proper diaphragmatic breathing to help with calming the nervous system in order to decrease pain and to improve abdominal wall and pelvic floor musculature extensibility.          Start:  05/06/25    Expected End:  06/03/25            Pt will report minimal to no pain with single digit pelvic assessment and display relaxation demonstrating improving pelvic floor muscle relaxation and coordination.          Start:  05/06/25    Expected End:  06/03/25            Pt to perform "the knack" prior to coughing, laughing or sneezing to decrease risk of incontinence.         Start:  05/06/25    Expected End:  06/03/25                Hilda Lyon, PT      "

## 2025-05-08 ENCOUNTER — PATIENT MESSAGE (OUTPATIENT)
Dept: PODIATRY | Facility: CLINIC | Age: 65
End: 2025-05-08
Payer: COMMERCIAL

## 2025-05-08 ENCOUNTER — PATIENT MESSAGE (OUTPATIENT)
Dept: ADMINISTRATIVE | Facility: OTHER | Age: 65
End: 2025-05-08
Payer: COMMERCIAL

## 2025-05-13 ENCOUNTER — PATIENT MESSAGE (OUTPATIENT)
Dept: BARIATRICS | Facility: CLINIC | Age: 65
End: 2025-05-13
Payer: COMMERCIAL

## 2025-05-22 ENCOUNTER — CLINICAL SUPPORT (OUTPATIENT)
Dept: REHABILITATION | Facility: HOSPITAL | Age: 65
End: 2025-05-22
Payer: COMMERCIAL

## 2025-05-22 DIAGNOSIS — M62.89 PELVIC FLOOR DYSFUNCTION: ICD-10-CM

## 2025-05-22 DIAGNOSIS — M62.89 PELVIC FLOOR DYSFUNCTION IN FEMALE: Primary | ICD-10-CM

## 2025-05-22 DIAGNOSIS — R10.2 PELVIC PAIN: ICD-10-CM

## 2025-05-22 PROCEDURE — 97530 THERAPEUTIC ACTIVITIES: CPT

## 2025-05-22 PROCEDURE — 97112 NEUROMUSCULAR REEDUCATION: CPT

## 2025-05-22 PROCEDURE — 97140 MANUAL THERAPY 1/> REGIONS: CPT

## 2025-05-23 NOTE — PROGRESS NOTES
Physical Therapy Visit    Patient Name: Ayah Ceron  MRN: [id number]  YOB: 1960  Encounter Date: 5/22/2025    Therapy Diagnosis:   Encounter Diagnoses   Name Primary?    Pelvic floor dysfunction in female Yes    Pelvic floor dysfunction     Pelvic pain      Physician: Shani Muniz MD    Physician Orders: Eval and Treat  Medical Diagnosis: Pelvic floor dysfunction in female    Visit # / Visits Authorized:  1 / 10  Insurance Authorization Period: 4/14/2025 to 12/31/2025  Date of Evaluation: 5/6/2025  Plan of Care Certification: 5/6/2025 to 7/29/2025      PT/PTA:     Number of PTA visits since last PT visit:   Time In: 1504   Time Out: 1545  Total Time (in minutes): 41   Total Billable Time (in minutes): 41    FOTO:  Intake Score:  %  Survey Score 2:  %  Survey Score 3:  %    Precautions:       Subjective   No pain reported after the last visit..  Pain reported as 0/10.      Objective            Treatment:  Manual Therapy  MT 1: intravaginal myofascial and scar tissue release donna  Balance/Neuromuscular Re-Education  NMR 1: Kegel edu and practice focusing on coordination with the breath and decrased overflow from acccessory msucles  NMR 2: pelvic muscle downtraining/bulging training pelvic floor relaxation speed after performing a kegel  Therapeutic Activity  TA 1: Plan of care review   Anatomy review and discussion of the anatomy on current level of function  TA 2: edu on pelvic wand benefits and use  TA 3: 360 breathing training    Time Entry(in minutes):  Manual Therapy Time Entry: 23  Neuromuscular Re-Education Time Entry: 8  Therapeutic Activity Time Entry: 10    Assessment & Plan   Assessment: Ayah consents to an intravaginal pfm assessment and tx today.  Worked on myofascial release and pfm coordination trianing today.  Worked distally to pt's reported sore area (retropubic) today as well as externally to that spot.  Evaluation/Treatment Tolerance: Patient tolerated treatment well    The  "patient will continue to benefit from skilled outpatient physical therapy in order to address the deficits listed in the problem list on the initial evaluation, provide patient and family education, and maximize the patients level of independence in the home and community environments.     The patient's spiritual, cultural, and educational needs were considered, and the patient is agreeable to the plan of care and goals.           Plan: retropubic pfm treatment    Goals:   Active       LTG       Pt to be discharged with home plan for carry over after discharge.   (Progressing)       Start:  05/06/25    Expected End:  07/29/25            Pt to report being able to have a comfortable rectal exam without significant increase in pelvic pain.  (Progressing)       Start:  05/06/25    Expected End:  07/29/25            Patient will report a reduction in pelvic pain with intercourse by at least 80% to demonstrate a return towards prior level of function    (Progressing)       Start:  05/06/25    Expected End:  07/29/25               STG       Pt to demonstrate proper diaphragmatic breathing to help with calming the nervous system in order to decrease pain and to improve abdominal wall and pelvic floor musculature extensibility.    (Progressing)       Start:  05/06/25    Expected End:  06/03/25            Pt will report minimal to no pain with single digit pelvic assessment and display relaxation demonstrating improving pelvic floor muscle relaxation and coordination.    (Progressing)       Start:  05/06/25    Expected End:  06/03/25            Pt to perform "the knack" prior to coughing, laughing or sneezing to decrease risk of incontinence.   (Progressing)       Start:  05/06/25    Expected End:  06/03/25                Hilda Lyon, PT    "

## 2025-05-27 ENCOUNTER — OFFICE VISIT (OUTPATIENT)
Dept: ORTHOPEDICS | Facility: CLINIC | Age: 65
End: 2025-05-27
Payer: COMMERCIAL

## 2025-05-27 VITALS — WEIGHT: 144.38 LBS | BODY MASS INDEX: 25.58 KG/M2 | HEIGHT: 63 IN

## 2025-05-27 DIAGNOSIS — M20.41 HAMMER TOES OF BOTH FEET: Primary | ICD-10-CM

## 2025-05-27 DIAGNOSIS — M20.42 HAMMER TOES OF BOTH FEET: Primary | ICD-10-CM

## 2025-05-27 PROCEDURE — 1159F MED LIST DOCD IN RCRD: CPT | Mod: CPTII,S$GLB,, | Performed by: ORTHOPAEDIC SURGERY

## 2025-05-27 PROCEDURE — 99999 PR PBB SHADOW E&M-EST. PATIENT-LVL IV: CPT | Mod: PBBFAC,,, | Performed by: ORTHOPAEDIC SURGERY

## 2025-05-27 PROCEDURE — 99203 OFFICE O/P NEW LOW 30 MIN: CPT | Mod: S$GLB,,, | Performed by: ORTHOPAEDIC SURGERY

## 2025-05-27 PROCEDURE — 3008F BODY MASS INDEX DOCD: CPT | Mod: CPTII,S$GLB,, | Performed by: ORTHOPAEDIC SURGERY

## 2025-05-27 RX ORDER — ESTRADIOL 10 UG/1
INSERT VAGINAL
COMMUNITY
Start: 2025-04-28

## 2025-06-03 ENCOUNTER — PATIENT MESSAGE (OUTPATIENT)
Dept: BARIATRICS | Facility: CLINIC | Age: 65
End: 2025-06-03
Payer: COMMERCIAL

## 2025-06-03 ENCOUNTER — CLINICAL SUPPORT (OUTPATIENT)
Dept: REHABILITATION | Facility: HOSPITAL | Age: 65
End: 2025-06-03
Payer: COMMERCIAL

## 2025-06-03 ENCOUNTER — OFFICE VISIT (OUTPATIENT)
Dept: INTERNAL MEDICINE | Facility: CLINIC | Age: 65
End: 2025-06-03
Payer: COMMERCIAL

## 2025-06-03 ENCOUNTER — PATIENT MESSAGE (OUTPATIENT)
Dept: INTERNAL MEDICINE | Facility: CLINIC | Age: 65
End: 2025-06-03

## 2025-06-03 DIAGNOSIS — R53.1 WEAKNESS: ICD-10-CM

## 2025-06-03 DIAGNOSIS — M62.89 PELVIC FLOOR DYSFUNCTION: Primary | ICD-10-CM

## 2025-06-03 PROCEDURE — 97530 THERAPEUTIC ACTIVITIES: CPT

## 2025-06-03 PROCEDURE — 97140 MANUAL THERAPY 1/> REGIONS: CPT

## 2025-06-03 PROCEDURE — 99499 UNLISTED E&M SERVICE: CPT | Mod: 95,,,

## 2025-06-03 PROCEDURE — 97112 NEUROMUSCULAR REEDUCATION: CPT

## 2025-06-03 RX ORDER — SEMAGLUTIDE 1 MG/.5ML
1 INJECTION, SOLUTION SUBCUTANEOUS
Qty: 2 ML | Refills: 2 | Status: ACTIVE | OUTPATIENT
Start: 2025-06-03

## 2025-06-06 ENCOUNTER — PATIENT MESSAGE (OUTPATIENT)
Dept: ADMINISTRATIVE | Facility: OTHER | Age: 65
End: 2025-06-06
Payer: COMMERCIAL

## 2025-06-09 ENCOUNTER — CLINICAL SUPPORT (OUTPATIENT)
Dept: REHABILITATION | Facility: HOSPITAL | Age: 65
End: 2025-06-09
Payer: COMMERCIAL

## 2025-06-09 DIAGNOSIS — R53.1 WEAKNESS: ICD-10-CM

## 2025-06-09 DIAGNOSIS — M62.89 PELVIC FLOOR DYSFUNCTION: Primary | ICD-10-CM

## 2025-06-09 PROCEDURE — 97140 MANUAL THERAPY 1/> REGIONS: CPT

## 2025-06-09 PROCEDURE — 97112 NEUROMUSCULAR REEDUCATION: CPT

## 2025-06-09 PROCEDURE — 97530 THERAPEUTIC ACTIVITIES: CPT

## 2025-06-09 NOTE — PROGRESS NOTES
Physical Therapy Progress Note    Patient Name: Ayah Ceron  MRN: [id number]  YOB: 1960  Encounter Date: 6/9/2025    Therapy Diagnosis:   Encounter Diagnoses   Name Primary?    Pelvic floor dysfunction Yes    Weakness      Physician: Shani Muniz MD    Physician Orders: Eval and Treat  Medical Diagnosis: Pelvic floor dysfunction in female  Surgical Diagnosis: Not applicable for this Episode   Surgical Date: Not applicable for this Episode    Visit # / Visits Authorized:  3 / 10  Insurance Authorization Period: 4/14/2025 to 12/31/2025  Date of Evaluation: 5/6/2025  Plan of Care Certification: 5/6/2025 to 7/29/2025      PT/PTA:     Number of PTA visits since last PT visit:   Time In: 0803   Time Out: 0845  Total Time (in minutes): 42   Total Billable Time (in minutes): 42    FOTO:  Intake Score:  %  Survey Score 2:  %  Survey Score 3:  %    Precautions:       Subjective             Objective            Treatment:  Manual Therapy  MT 1: intravaginal myofascial and scar tissue release donna focusing on layer 3 (especially around the retropubic area)  Bimanual technique used today.  Progressed to use 2 fingers to treat retropubic area  Balance/Neuromuscular Re-Education  NMR 1: Kegel edu and practice focusing on coordination with the breath and decrased overflow from acccessory msucles  NMR 2: pelvic muscle downtraining/bulging training pelvic floor relaxation speed after performing a kegel  Therapeutic Activity  TA 1: Plan of care review   Anatomy review and discussion of the anatomy on current level of function  TA 2: edu on pelvic wand benefits and use    Time Entry(in minutes):  Manual Therapy Time Entry: 26  Neuromuscular Re-Education Time Entry: 8  Therapeutic Activity Time Entry: 8    Assessment & Plan   Assessment: Ayah consents to an intravaginal pfm assessment and tx today.  Worked on myofascial release and pfm coordination training today.  Focused on retropubic release of tissues.  Also  "worked on pfm downtraining.  Decreased pain and discomfort reported today.  Evaluation/Treatment Tolerance: Patient tolerated treatment well    The patient will continue to benefit from skilled outpatient physical therapy in order to address the deficits listed in the problem list on the initial evaluation, provide patient and family education, and maximize the patients level of independence in the home and community environments.     The patient's spiritual, cultural, and educational needs were considered, and the patient is agreeable to the plan of care and goals.           Plan:      Goals:   Active       LTG       Pt to be discharged with home plan for carry over after discharge.   (Progressing)       Start:  05/06/25    Expected End:  07/29/25            Pt to report being able to have a comfortable rectal exam without significant increase in pelvic pain.  (Progressing)       Start:  05/06/25    Expected End:  07/29/25            Patient will report a reduction in pelvic pain with intercourse by at least 80% to demonstrate a return towards prior level of function    (Progressing)       Start:  05/06/25    Expected End:  07/29/25               STG       Pt to demonstrate proper diaphragmatic breathing to help with calming the nervous system in order to decrease pain and to improve abdominal wall and pelvic floor musculature extensibility.    (Progressing)       Start:  05/06/25    Expected End:  06/03/25            Pt will report minimal to no pain with single digit pelvic assessment and display relaxation demonstrating improving pelvic floor muscle relaxation and coordination.    (Progressing)       Start:  05/06/25    Expected End:  06/03/25            Pt to perform "the knack" prior to coughing, laughing or sneezing to decrease risk of incontinence.   (Progressing)       Start:  05/06/25    Expected End:  06/03/25                Hilda Lyon, PT    "

## 2025-06-16 ENCOUNTER — CLINICAL SUPPORT (OUTPATIENT)
Dept: REHABILITATION | Facility: HOSPITAL | Age: 65
End: 2025-06-16
Payer: COMMERCIAL

## 2025-06-16 DIAGNOSIS — R53.1 WEAKNESS: ICD-10-CM

## 2025-06-16 DIAGNOSIS — M62.89 PELVIC FLOOR DYSFUNCTION: Primary | ICD-10-CM

## 2025-06-16 PROCEDURE — 97112 NEUROMUSCULAR REEDUCATION: CPT

## 2025-06-16 PROCEDURE — 97530 THERAPEUTIC ACTIVITIES: CPT

## 2025-06-16 NOTE — PROGRESS NOTES
Physical Therapy Visit    Patient Name: Ayah Ceron  MRN: [id number]  YOB: 1960  Encounter Date: 6/16/2025    Therapy Diagnosis:   Encounter Diagnoses   Name Primary?    Pelvic floor dysfunction Yes    Weakness      Physician: Shani Muniz MD    Physician Orders: Eval and Treat  Medical Diagnosis: Pelvic floor dysfunction in female  Surgical Diagnosis: Not applicable for this Episode   Surgical Date: Not applicable for this Episode  Days Since Last Surgery: Not applicable for this Episode    Visit # / Visits Authorized:  4 / 10  Insurance Authorization Period: 4/14/2025 to 12/31/2025  Date of Evaluation: 5/6/2025  Plan of Care Certification: 5/6/2025 to 7/29/2025      PT/PTA:     Number of PTA visits since last PT visit:   Time In: 0957   Time Out: 1038  Total Time (in minutes): 41   Total Billable Time (in minutes): 43    FOTO:  Intake Score:  %  Survey Score 2:  %  Survey Score 3:  %    Precautions:       Subjective   No pain reported after the last visit.  Brought in pelvic wand today..  Pain reported as 0/10. No pain reported with tampon insertion in the past     Objective            Treatment:  Balance/Neuromuscular Re-Education  NMR 1: Kegel edu and practice focusing on coordination with the breath and decrased overflow from acccessory msucles  NMR 2: pelvic muscle downtraining/bulging training pelvic floor relaxation speed after performing a kegel  Therapeutic Activity  TA 1: Plan of care review   Anatomy review and discussion of the anatomy on current level of function  TA 2: edu on pelvic wand benefits and use.  Practiced using wand today with cues for technique and safety    Time Entry(in minutes):  Neuromuscular Re-Education Time Entry: 31  Therapeutic Activity Time Entry: 12    Assessment & Plan   Assessment: Ayah consents to an intravaginal pfm assessment and tx today.  Worked on myofascial release and pfm coordination training today.  Also worked on pfm downtraining. Improved  "ability noted to relax pelvic floor muscle(s) without performing a paradoxical contraction.  Ayah brought in her pelvic wand so edu was provided on use and technique.   D  Evaluation/Treatment Tolerance: Patient tolerated treatment well    The patient will continue to benefit from skilled outpatient physical therapy in order to address the deficits listed in the problem list on the initial evaluation, provide patient and family education, and maximize the patients level of independence in the home and community environments.     The patient's spiritual, cultural, and educational needs were considered, and the patient is agreeable to the plan of care and goals.           Plan: retropubic pfm treatment and pfm lengthening training    Goals:   Active       LTG       Pt to be discharged with home plan for carry over after discharge.   (Progressing)       Start:  05/06/25    Expected End:  07/29/25            Pt to report being able to have a comfortable rectal exam without significant increase in pelvic pain.  (Progressing)       Start:  05/06/25    Expected End:  07/29/25            Patient will report a reduction in pelvic pain with intercourse by at least 80% to demonstrate a return towards prior level of function    (Progressing)       Start:  05/06/25    Expected End:  07/29/25              Resolved       STG       Pt to demonstrate proper diaphragmatic breathing to help with calming the nervous system in order to decrease pain and to improve abdominal wall and pelvic floor musculature extensibility.    (Met)       Start:  05/06/25    Expected End:  06/03/25    Resolved:  06/16/25         Pt will report minimal to no pain with single digit pelvic assessment and display relaxation demonstrating improving pelvic floor muscle relaxation and coordination.    (Met)       Start:  05/06/25    Expected End:  06/03/25    Resolved:  06/16/25         Pt to perform "the knack" prior to coughing, laughing or sneezing to " decrease risk of incontinence.   (Met)       Start:  05/06/25    Expected End:  06/03/25    Resolved:  06/16/25             Hilda Lyon PT

## 2025-06-23 ENCOUNTER — PATIENT MESSAGE (OUTPATIENT)
Dept: OBSTETRICS AND GYNECOLOGY | Facility: CLINIC | Age: 65
End: 2025-06-23
Payer: COMMERCIAL

## 2025-06-23 ENCOUNTER — CLINICAL SUPPORT (OUTPATIENT)
Dept: REHABILITATION | Facility: HOSPITAL | Age: 65
End: 2025-06-23
Payer: COMMERCIAL

## 2025-06-23 DIAGNOSIS — M81.0 POST-MENOPAUSAL OSTEOPOROSIS: ICD-10-CM

## 2025-06-23 DIAGNOSIS — R53.1 WEAKNESS: ICD-10-CM

## 2025-06-23 DIAGNOSIS — M62.89 PELVIC FLOOR DYSFUNCTION: Primary | ICD-10-CM

## 2025-06-23 PROCEDURE — 97140 MANUAL THERAPY 1/> REGIONS: CPT

## 2025-06-23 PROCEDURE — 97530 THERAPEUTIC ACTIVITIES: CPT

## 2025-06-23 PROCEDURE — 97112 NEUROMUSCULAR REEDUCATION: CPT

## 2025-06-23 RX ORDER — ALENDRONATE SODIUM 70 MG/1
70 TABLET ORAL
Qty: 12 TABLET | Refills: 0 | Status: CANCELLED | OUTPATIENT
Start: 2025-06-23

## 2025-06-23 NOTE — PROGRESS NOTES
Outpatient Rehab    Physical Therapy Visit    Patient Name: Ayah Ceron  MRN: [id number]  YOB: 1960  Encounter Date: 6/23/2025    Therapy Diagnosis:   Encounter Diagnoses   Name Primary?    Pelvic floor dysfunction Yes    Weakness      Physician: Shani Muniz MD    Physician Orders: Eval and Treat  Medical Diagnosis: Pelvic floor dysfunction in female  Surgical Diagnosis: Not applicable for this Episode   Surgical Date: Not applicable for this Episode  Days Since Last Surgery: Not applicable for this Episode    Visit # / Visits Authorized:  5 / 10  Insurance Authorization Period: 4/14/2025 to 12/31/2025  Date of Evaluation: 5/6/2025  Plan of Care Certification: 5/6/2025 to 7/29/2025      PT/PTA:     Number of PTA visits since last PT visit:   Time In: 0800   Time Out: 0845  Total Time (in minutes): 45   Total Billable Time (in minutes): 45    FOTO:  Intake Score:  %  Survey Score 2:  %  Survey Score 3:  %    Precautions:       Subjective   No pain reported after the last visit.  Brought in pelvic wand today to work on using it independently at home..         Objective            Treatment:  Manual Therapy  MT 1: intravaginal myofascial and scar tissue release donna focusing on layer 3 (especially around the retropubic area)  Bimanual technique used today.  Balance/Neuromuscular Re-Education  NMR 2: pelvic muscle downtraining/bulging training pelvic floor relaxation speed after performing a kegel  Therapeutic Activity  TA 1: Plan of care review   Anatomy review and discussion of the anatomy on current level of function  TA 2: edu on pelvic wand benefits and use.  Practiced using wand today with cues for technique and safety    Time Entry(in minutes):  Manual Therapy Time Entry: 14  Neuromuscular Re-Education Time Entry: 8  Therapeutic Activity Time Entry: 23    Assessment & Plan   Assessment: Ayah consents to an intravaginal pfm assessment and tx today.  Worked on myofascial release and pfm  coordination training today.  Also worked on pfm downtraining. Improved ability noted to relax pelvic floor muscle(s) without performing a paradoxical contraction.  Ayah brought in her pelvic wand so edu was provided on use and technique.   Modified technique to help Ayah feel more comfortable using wand independently.         The patient will continue to benefit from skilled outpatient physical therapy in order to address the deficits listed in the problem list on the initial evaluation, provide patient and family education, and maximize the patients level of independence in the home and community environments.     The patient's spiritual, cultural, and educational needs were considered, and the patient is agreeable to the plan of care and goals.           Plan: retropubic pfm treatment and pfm lengthening training    Goals:   Active       LTG       Pt to be discharged with home plan for carry over after discharge.   (Progressing)       Start:  05/06/25    Expected End:  07/29/25            Pt to report being able to have a comfortable rectal exam without significant increase in pelvic pain.  (Progressing)       Start:  05/06/25    Expected End:  07/29/25            Patient will report a reduction in pelvic pain with intercourse by at least 80% to demonstrate a return towards prior level of function    (Progressing)       Start:  05/06/25    Expected End:  07/29/25              Resolved       STG       Pt to demonstrate proper diaphragmatic breathing to help with calming the nervous system in order to decrease pain and to improve abdominal wall and pelvic floor musculature extensibility.    (Met)       Start:  05/06/25    Expected End:  06/03/25    Resolved:  06/16/25         Pt will report minimal to no pain with single digit pelvic assessment and display relaxation demonstrating improving pelvic floor muscle relaxation and coordination.    (Met)       Start:  05/06/25    Expected End:  06/03/25    Resolved:   "06/16/25         Pt to perform "the knack" prior to coughing, laughing or sneezing to decrease risk of incontinence.   (Met)       Start:  05/06/25    Expected End:  06/03/25    Resolved:  06/16/25             Hilda Lyon, PT  "

## 2025-06-24 RX ORDER — ALENDRONATE SODIUM 70 MG/1
70 TABLET ORAL
Qty: 12 TABLET | Refills: 3 | Status: SHIPPED | OUTPATIENT
Start: 2025-06-24

## 2025-07-01 ENCOUNTER — CLINICAL SUPPORT (OUTPATIENT)
Dept: REHABILITATION | Facility: HOSPITAL | Age: 65
End: 2025-07-01
Payer: COMMERCIAL

## 2025-07-01 DIAGNOSIS — R53.1 WEAKNESS: ICD-10-CM

## 2025-07-01 DIAGNOSIS — M62.89 PELVIC FLOOR DYSFUNCTION: Primary | ICD-10-CM

## 2025-07-01 PROCEDURE — 97530 THERAPEUTIC ACTIVITIES: CPT

## 2025-07-01 PROCEDURE — 97140 MANUAL THERAPY 1/> REGIONS: CPT

## 2025-07-01 PROCEDURE — 97112 NEUROMUSCULAR REEDUCATION: CPT

## 2025-07-01 NOTE — PROGRESS NOTES
Physical Therapy Visit    Patient Name: Ayah Ceron  MRN: [id number]  YOB: 1960  Encounter Date: 7/1/2025    Therapy Diagnosis:   Encounter Diagnoses   Name Primary?    Pelvic floor dysfunction Yes    Weakness      Physician: Shani Muniz MD    Physician Orders: Eval and Treat  Medical Diagnosis: Pelvic floor dysfunction in female  Surgical Diagnosis: Not applicable for this Episode   Surgical Date: Not applicable for this Episode  Days Since Last Surgery: Not applicable for this Episode    Visit # / Visits Authorized:  6 / 10  Insurance Authorization Period: 4/14/2025 to 12/31/2025  Date of Evaluation: 5/6/2025  Plan of Care Certification: 5/6/2025 to 7/29/2025      PT/PTA:     Number of PTA visits since last PT visit:   Time In: 0803   Time Out: 0849  Total Time (in minutes): 46   Total Billable Time (in minutes): 46    FOTO:  Intake Score:  %  Survey Score 2:  %  Survey Score 3:  %    Precautions:       Subjective             Objective   Pelvic External Observations  Consent for Examination: Yes, Chaperone Present: Declines chaperone    Abdominal Assessment  Transversus Abdominis Contraction: Present    Pelvic Assessment  Perineal Skin Quality: Unremarkable  Pelvic Scarring: Yes  Perineal Descent Bearing Down: Present  Volitional Contraction: Present  Volitional Relaxation: Present  Pelvic Floor Cough Reaction: Unchanged          Pelvic Floor Palpation  Pelvic Floor Exams Performed: Internal Vaginal  Internal Vaginal Palpation Details: decreasing resting tension noted    Right External Pelvic Floor and Rectal Palpation  Unremarkable: Layer 1, Layer 2, and Layer 3          Left External Pelvic Floor and Rectal Palpation  Unremarkable: Layer 1, Layer 2, and Layer 3          Right Internal Pelvic Floor and Rectal Palpation  Unremarkable: Layer 1 and Layer 2          Left Internal Pelvic Floor and Rectal Palpation  Unremarkable: Layer 1 and Layer 2                 Treatment:  Manual Therapy  MT 1:  intravaginal myofascial and scar tissue release donna focusing on layer 3 (especially around the retropubic area)  Bimanual technique used today.  Balance/Neuromuscular Re-Education  NMR 2: pelvic muscle downtraining/bulging training pelvic floor relaxation speed after performing a kegel  Therapeutic Activity  TA 1: Plan of care review   Anatomy review and discussion of the anatomy on current level of function  TA 2: edu on pelvic wand benefits and use.  Practiced using wand today with cues for technique and safety.  Increased time spent on technique today.    Time Entry(in minutes):  Manual Therapy Time Entry: 14  Neuromuscular Re-Education Time Entry: 9    Assessment & Plan   Assessment: Ayah consents to an intravaginal pfm assessment and tx today.  Worked on myofascial release and pfm coordination training today.  Also worked on pfm downtraining. Improved ability noted to relax pelvic floor muscle(s) without performing a paradoxical contraction.  Ayah brought in her pelvic wand so edu was provided on use and technique.   Modified technique to help Ayah feel more comfortable using wand independently.  Increased time spent on wand technique today.         The patient will continue to benefit from skilled outpatient physical therapy in order to address the deficits listed in the problem list on the initial evaluation, provide patient and family education, and maximize the patients level of independence in the home and community environments.     The patient's spiritual, cultural, and educational needs were considered, and the patient is agreeable to the plan of care and goals.           Plan: retropubic pfm treatment and pfm lengthening training    Goals:   Active       LTG       Pt to be discharged with home plan for carry over after discharge.   (Progressing)       Start:  05/06/25    Expected End:  07/29/25            Pt to report being able to have a comfortable rectal exam without significant increase in pelvic  "pain.  (Progressing)       Start:  05/06/25    Expected End:  07/29/25            Patient will report a reduction in pelvic pain with intercourse by at least 80% to demonstrate a return towards prior level of function    (Progressing)       Start:  05/06/25    Expected End:  07/29/25              Resolved       STG       Pt to demonstrate proper diaphragmatic breathing to help with calming the nervous system in order to decrease pain and to improve abdominal wall and pelvic floor musculature extensibility.    (Met)       Start:  05/06/25    Expected End:  06/03/25    Resolved:  06/16/25         Pt will report minimal to no pain with single digit pelvic assessment and display relaxation demonstrating improving pelvic floor muscle relaxation and coordination.    (Met)       Start:  05/06/25    Expected End:  06/03/25    Resolved:  06/16/25         Pt to perform "the knack" prior to coughing, laughing or sneezing to decrease risk of incontinence.   (Met)       Start:  05/06/25    Expected End:  06/03/25    Resolved:  06/16/25             Hilda Lyon, PT    "

## 2025-07-04 ENCOUNTER — PATIENT MESSAGE (OUTPATIENT)
Dept: ADMINISTRATIVE | Facility: OTHER | Age: 65
End: 2025-07-04
Payer: COMMERCIAL

## 2025-07-07 ENCOUNTER — PATIENT MESSAGE (OUTPATIENT)
Dept: BARIATRICS | Facility: CLINIC | Age: 65
End: 2025-07-07
Payer: COMMERCIAL

## 2025-07-08 ENCOUNTER — PATIENT MESSAGE (OUTPATIENT)
Dept: BARIATRICS | Facility: CLINIC | Age: 65
End: 2025-07-08
Payer: COMMERCIAL

## 2025-07-14 ENCOUNTER — CLINICAL SUPPORT (OUTPATIENT)
Dept: REHABILITATION | Facility: HOSPITAL | Age: 65
End: 2025-07-14
Payer: COMMERCIAL

## 2025-07-14 DIAGNOSIS — R53.1 WEAKNESS: ICD-10-CM

## 2025-07-14 DIAGNOSIS — M62.89 PELVIC FLOOR DYSFUNCTION: Primary | ICD-10-CM

## 2025-07-14 PROCEDURE — 97112 NEUROMUSCULAR REEDUCATION: CPT

## 2025-07-14 PROCEDURE — 97530 THERAPEUTIC ACTIVITIES: CPT

## 2025-07-14 PROCEDURE — 97140 MANUAL THERAPY 1/> REGIONS: CPT

## 2025-07-14 NOTE — PROGRESS NOTES
"Physical Therapy Progress Note    Patient Name: Ayah Ceron  MRN: [id number]  YOB: 1960  Encounter Date: 7/14/2025    Therapy Diagnosis:   Encounter Diagnoses   Name Primary?    Pelvic floor dysfunction Yes    Weakness      Physician: Shani Muniz MD    Physician Orders: Eval and Treat  Medical Diagnosis: Pelvic floor dysfunction in female  Surgical Diagnosis: Not applicable for this Episode   Surgical Date: Not applicable for this Episode  Days Since Last Surgery: Not applicable for this Episode    Visit # / Visits Authorized:  7 / 10  Insurance Authorization Period: 4/14/2025 to 12/31/2025  Date of Evaluation: 5/6/2025  Plan of Care Certification: 5/6/2025 to 7/29/2025      PT/PTA:     Number of PTA visits since last PT visit:   Time In: 0801   Time Out: 0846  Total Time (in minutes): 45   Total Billable Time (in minutes): 45    FOTO:  Intake Score: Not applicable for this Episode%  Survey Score 2: Not applicable for this Episode%  Survey Score 3: Not applicable for this Episode%    Precautions:       Subjective   "I think I'm doing better.  I'm not finding as many irritated spots with the wand.".  Pain reported as 0/10.      Objective   Pelvic External Observations  Consent for Examination: Yes, Chaperone Present: Declines chaperone    Abdominal Assessment  Transversus Abdominis Contraction: Weak    Pelvic Assessment  Perineal Skin Quality: Unremarkable  Pelvic Scarring: Yes  Perineal Body Resting Position: Normal  Volitional Contraction: Present  Volitional Relaxation: Present  Volitional Bearing Down: Present  Pelvic Floor Cough Reaction: Descent          Pelvic Floor Palpation  Pelvic Floor Exams Performed: Internal Vaginal  Internal Vaginal Palpation Details: decreasing resting tension noted                                        Pelvic Floor Special Tests  Single Digit Intravaginal Assessment  Intravaginal Pelvic Floor Strength: 2  Intravaginal Pelvic Floor Co-Contraction Substitution: " Absent  Intravaginal Pelvic Floor Relaxation Response: Normal  Right Vaginal Sensation: Intact  Left Vaginal Sensation: Intact             Treatment:  Manual Therapy  MT 1: intravaginal myofascial and scar tissue release donna focusing on layer 3 (especially around the retropubic area)  Bimanual technique used today.  Balance/Neuromuscular Re-Education  NMR 1: Kegel edu and practice focusing on coordination with the breath and decrased overflow from acccessory msucles  NMR 2: Pt struggles to isolate TrA initially but improves with practice. Tactile and verbal cues required for correct activation of TrA. Initially pt substitutes with rectus abdominus muscle but with mod verbal and tactile instruction is able to isolate out lower abdominal muscles. Transverse abdominus muscle 5 sec x 10 reps. Transverse abdominus muscle +Kegel+BKFO/Marching x10 reps.  NMR 4: pelvic muscle downtraining/bulging training pelvic floor relaxation speed after performing a kegel    Time Entry(in minutes):  Manual Therapy Time Entry: 12  Neuromuscular Re-Education Time Entry: 25  Therapeutic Activity Time Entry: 8    Assessment & Plan   Assessment: Ayah consents to an intravaginal pfm assessment and tx today.  Worked on myofascial release and pfm coordination training today.  Also worked on pfm downtraining. Improved ability noted to relax pelvic floor muscle(s) without performing a paradoxical contraction. Initiated transverse abdominus muscle training today.   Evaluation/Treatment Tolerance: Patient tolerated treatment well    The patient will continue to benefit from skilled outpatient physical therapy in order to address the deficits listed in the problem list on the initial evaluation, provide patient and family education, and maximize the patients level of independence in the home and community environments.     The patient's spiritual, cultural, and educational needs were considered, and the patient is agreeable to the plan of care and  "goals.           Plan: progress hip and core strengthening training    Goals:   Active       LTG       Pt to be discharged with home plan for carry over after discharge.   (Progressing)       Start:  05/06/25    Expected End:  07/29/25            Pt to report being able to have a comfortable rectal exam without significant increase in pelvic pain.  (Progressing)       Start:  05/06/25    Expected End:  07/29/25            Patient will report a reduction in pelvic pain with intercourse by at least 80% to demonstrate a return towards prior level of function    (Progressing)       Start:  05/06/25    Expected End:  07/29/25              Resolved       STG       Pt to demonstrate proper diaphragmatic breathing to help with calming the nervous system in order to decrease pain and to improve abdominal wall and pelvic floor musculature extensibility.    (Met)       Start:  05/06/25    Expected End:  06/03/25    Resolved:  06/16/25         Pt will report minimal to no pain with single digit pelvic assessment and display relaxation demonstrating improving pelvic floor muscle relaxation and coordination.    (Met)       Start:  05/06/25    Expected End:  06/03/25    Resolved:  06/16/25         Pt to perform "the knack" prior to coughing, laughing or sneezing to decrease risk of incontinence.   (Met)       Start:  05/06/25    Expected End:  06/03/25    Resolved:  06/16/25             Hilda Lyon, PT    "

## 2025-07-14 NOTE — PATIENT INSTRUCTIONS
"Home Exercise Program: 07/14/2025    Abdominal Muscle Training:  Tighten your abdominal muscles without sucking in our pooching your belly.  Tighten and draw in your muscles.  Don't hold your breath!  It actually helps to exhale while your tighten.  "Blow out birthday candles."     Hold 5 seconds.  Repeat 10 times.  2 sets per day.                  "

## 2025-07-28 ENCOUNTER — CLINICAL SUPPORT (OUTPATIENT)
Dept: REHABILITATION | Facility: HOSPITAL | Age: 65
End: 2025-07-28
Payer: COMMERCIAL

## 2025-07-28 DIAGNOSIS — M62.89 PELVIC FLOOR DYSFUNCTION: Primary | ICD-10-CM

## 2025-07-28 DIAGNOSIS — R53.1 WEAKNESS: ICD-10-CM

## 2025-07-28 PROCEDURE — 97112 NEUROMUSCULAR REEDUCATION: CPT

## 2025-07-28 PROCEDURE — 97530 THERAPEUTIC ACTIVITIES: CPT

## 2025-07-28 PROCEDURE — 97140 MANUAL THERAPY 1/> REGIONS: CPT

## 2025-07-28 NOTE — PROGRESS NOTES
Physical Therapy Discharge    Patient Name: Ayah Ceron  MRN: [id number]  YOB: 1960  Encounter Date: 7/28/2025    Therapy Diagnosis:   Encounter Diagnoses   Name Primary?    Pelvic floor dysfunction Yes    Weakness      Physician: Shani Muniz MD    Physician Orders: Eval and Treat  Medical Diagnosis: Pelvic floor dysfunction in female  Surgical Diagnosis: Not applicable for this Episode   Surgical Date: Not applicable for this Episode  Days Since Last Surgery: Not applicable for this Episode    Visit # / Visits Authorized:  8 / 10  Insurance Authorization Period: 4/14/2025 to 12/31/2025  Date of Evaluation: 5/6/2025  Plan of Care Certification: 5/6/2025 to 7/29/2025      PT/PTA:     Number of PTA visits since last PT visit:   Time In: 0800   Time Out: 0842  Total Time (in minutes): 42   Total Billable Time (in minutes):      FOTO:  Intake Score (%): Not applicable for this Episode  Survey Score 2 (%): Not applicable for this Episode  Survey Score 3 (%): Not applicable for this Episode    Precautions:       Subjective   Has made at least 75% percent progress since starting therapy..  Pain reported as 0/10.      Objective   Pelvic External Observations  Consent for Examination: Yes, Chaperone Present: Declines chaperone    Abdominal Assessment  Transversus Abdominis Contraction: Present    Pelvic Assessment  Perineal Skin Quality: Unremarkable  Pelvic Scarring: Yes  Perineal Body Resting Position: Normal  Volitional Contraction: Present  Volitional Relaxation: Present  Volitional Bearing Down: Present          Pelvic Floor Palpation  Pelvic Floor Exams Performed: Internal Vaginal  Internal Vaginal Palpation Details: decreasing resting tension noted    Right External Pelvic Floor and Rectal Palpation  Unremarkable: Layer 1, Layer 2, and Layer 3          Left External Pelvic Floor and Rectal Palpation  Unremarkable: Layer 1, Layer 2, and Layer 3                              Pelvic Floor Special  Tests  Single Digit Intravaginal Assessment  Intravaginal Pelvic Floor Strength: 2  Intravaginal Pelvic Floor Co-Contraction Substitution: Absent  Intravaginal Pelvic Floor Relaxation Response: Normal  Right Vaginal Sensation: Intact  Left Vaginal Sensation: Intact             Treatment:  Manual Therapy  MT 1: intravaginal myofascial and scar tissue release donna focusing on layer 3 (especially around the retropubic area)  Bimanual technique used today.  Balance/Neuromuscular Re-Education  NMR 1: Kegel edu and practice focusing on coordination with the breath and decrased overflow from acccessory msucles  NMR 2: Transverse abdominus muscle 5 sec x 10 reps. Transverse abdominus muscle +Kegel+BKFO/Marching x10 reps.  NMR 4: pelvic muscle downtraining/bulging training pelvic floor relaxation speed after performing a kegel  Therapeutic Activity  TA 1: Discharge planning.  Anatomy review and discussion of the anatomy on current level of function  TA 2: edu on pelvic wand benefits and use beyond therapy    Time Entry(in minutes):  Manual Therapy Time Entry: 11  Neuromuscular Re-Education Time Entry: 23    Assessment & Plan   Assessment: Ayah has made excellent progress with participation in the POC towards reduction of pelvic pain.  She has met the majority of her goals and is independent with her home program.  At this time Ayah no longer requires skilled intervention and is appropriate for discharge.  Evaluation/Treatment Tolerance: Patient tolerated treatment well    The patient's spiritual, cultural, and educational needs were considered, and the patient is agreeable to the plan of care and goals.           Plan: discharge from physical therapy    Goals:   Active       LTG       Pt to be discharged with home plan for carry over after discharge.   (Met)       Start:  05/06/25    Expected End:  07/29/25    Resolved:  07/28/25         Pt to report being able to have a comfortable rectal exam without significant increase in  "pelvic pain.  (Progressing)       Start:  05/06/25    Expected End:  07/29/25            Patient will report a reduction in pelvic pain with intercourse by at least 80% to demonstrate a return towards prior level of function    (Met)       Start:  05/06/25    Expected End:  07/29/25    Resolved:  07/28/25           Resolved       STG       Pt to demonstrate proper diaphragmatic breathing to help with calming the nervous system in order to decrease pain and to improve abdominal wall and pelvic floor musculature extensibility.    (Met)       Start:  05/06/25    Expected End:  06/03/25    Resolved:  06/16/25         Pt will report minimal to no pain with single digit pelvic assessment and display relaxation demonstrating improving pelvic floor muscle relaxation and coordination.    (Met)       Start:  05/06/25    Expected End:  06/03/25    Resolved:  06/16/25         Pt to perform "the knack" prior to coughing, laughing or sneezing to decrease risk of incontinence.   (Met)       Start:  05/06/25    Expected End:  06/03/25    Resolved:  06/16/25             Hilda Lyon, PT  "

## 2025-07-30 ENCOUNTER — PATIENT MESSAGE (OUTPATIENT)
Dept: ADMINISTRATIVE | Facility: OTHER | Age: 65
End: 2025-07-30
Payer: COMMERCIAL

## 2025-08-12 ENCOUNTER — PATIENT MESSAGE (OUTPATIENT)
Dept: BARIATRICS | Facility: CLINIC | Age: 65
End: 2025-08-12
Payer: COMMERCIAL

## 2025-08-26 RX ORDER — SEMAGLUTIDE 1 MG/.5ML
1 INJECTION, SOLUTION SUBCUTANEOUS
Qty: 2 ML | Refills: 2 | OUTPATIENT
Start: 2025-08-26

## 2025-08-27 ENCOUNTER — PATIENT MESSAGE (OUTPATIENT)
Dept: INTERNAL MEDICINE | Facility: CLINIC | Age: 65
End: 2025-08-27

## 2025-08-27 ENCOUNTER — OFFICE VISIT (OUTPATIENT)
Dept: INTERNAL MEDICINE | Facility: CLINIC | Age: 65
End: 2025-08-27
Payer: COMMERCIAL

## 2025-08-27 PROCEDURE — 99499 UNLISTED E&M SERVICE: CPT | Mod: 95,,,

## 2025-08-27 RX ORDER — TIRZEPATIDE 5 MG/.5ML
5 INJECTION, SOLUTION SUBCUTANEOUS
Qty: 2 ML | Refills: 2 | Status: ACTIVE | OUTPATIENT
Start: 2025-08-27

## 2025-09-04 RX ORDER — LORATADINE 10 MG/1
10 TABLET ORAL DAILY
Qty: 90 TABLET | Refills: 1 | Status: SHIPPED | OUTPATIENT
Start: 2025-09-04 | End: 2026-03-03